# Patient Record
Sex: FEMALE | Race: WHITE | Employment: OTHER | URBAN - METROPOLITAN AREA
[De-identification: names, ages, dates, MRNs, and addresses within clinical notes are randomized per-mention and may not be internally consistent; named-entity substitution may affect disease eponyms.]

---

## 2017-01-03 ENCOUNTER — OFFICE VISIT (OUTPATIENT)
Dept: FAMILY MEDICINE CLINIC | Age: 82
End: 2017-01-03

## 2017-01-03 VITALS
DIASTOLIC BLOOD PRESSURE: 82 MMHG | HEIGHT: 66 IN | SYSTOLIC BLOOD PRESSURE: 146 MMHG | WEIGHT: 151 LBS | BODY MASS INDEX: 24.27 KG/M2

## 2017-01-03 DIAGNOSIS — I10 ESSENTIAL HYPERTENSION: Primary | ICD-10-CM

## 2017-01-03 DIAGNOSIS — Z23 FLU VACCINE NEED: ICD-10-CM

## 2017-01-03 DIAGNOSIS — E78.5 HYPERLIPIDEMIA, UNSPECIFIED HYPERLIPIDEMIA TYPE: ICD-10-CM

## 2017-01-03 DIAGNOSIS — I25.9 CHRONIC ISCHEMIC HEART DISEASE: Chronic | ICD-10-CM

## 2017-01-03 LAB
A/G RATIO: 2 (ref 1.1–2.2)
ALBUMIN SERPL-MCNC: 4.5 G/DL (ref 3.4–5)
ALP BLD-CCNC: 68 U/L (ref 40–129)
ALT SERPL-CCNC: 20 U/L (ref 10–40)
ANION GAP SERPL CALCULATED.3IONS-SCNC: 14 MMOL/L (ref 3–16)
AST SERPL-CCNC: 26 U/L (ref 15–37)
BASOPHILS ABSOLUTE: 0.1 K/UL (ref 0–0.2)
BASOPHILS RELATIVE PERCENT: 1 %
BILIRUB SERPL-MCNC: 0.6 MG/DL (ref 0–1)
BUN BLDV-MCNC: 16 MG/DL (ref 7–20)
CALCIUM SERPL-MCNC: 10.7 MG/DL (ref 8.3–10.6)
CHLORIDE BLD-SCNC: 105 MMOL/L (ref 99–110)
CHOLESTEROL, TOTAL: 180 MG/DL (ref 0–199)
CO2: 23 MMOL/L (ref 21–32)
CREAT SERPL-MCNC: 0.8 MG/DL (ref 0.6–1.2)
EOSINOPHILS ABSOLUTE: 0.6 K/UL (ref 0–0.6)
EOSINOPHILS RELATIVE PERCENT: 7.6 %
GFR AFRICAN AMERICAN: >60
GFR NON-AFRICAN AMERICAN: >60
GLOBULIN: 2.3 G/DL
GLUCOSE BLD-MCNC: 95 MG/DL (ref 70–99)
HCT VFR BLD CALC: 48.1 % (ref 36–48)
HDLC SERPL-MCNC: 45 MG/DL (ref 40–60)
HEMOGLOBIN: 15.8 G/DL (ref 12–16)
LDL CHOLESTEROL CALCULATED: 98 MG/DL
LYMPHOCYTES ABSOLUTE: 1.1 K/UL (ref 1–5.1)
LYMPHOCYTES RELATIVE PERCENT: 14.9 %
MCH RBC QN AUTO: 28.9 PG (ref 26–34)
MCHC RBC AUTO-ENTMCNC: 32.8 G/DL (ref 31–36)
MCV RBC AUTO: 88 FL (ref 80–100)
MONOCYTES ABSOLUTE: 0.5 K/UL (ref 0–1.3)
MONOCYTES RELATIVE PERCENT: 6.1 %
NEUTROPHILS ABSOLUTE: 5.4 K/UL (ref 1.7–7.7)
NEUTROPHILS RELATIVE PERCENT: 70.4 %
PDW BLD-RTO: 14.1 % (ref 12.4–15.4)
PLATELET # BLD: 158 K/UL (ref 135–450)
PMV BLD AUTO: 11.2 FL (ref 5–10.5)
POTASSIUM SERPL-SCNC: 4.3 MMOL/L (ref 3.5–5.1)
RBC # BLD: 5.47 M/UL (ref 4–5.2)
SODIUM BLD-SCNC: 142 MMOL/L (ref 136–145)
TOTAL PROTEIN: 6.8 G/DL (ref 6.4–8.2)
TRIGL SERPL-MCNC: 183 MG/DL (ref 0–150)
VLDLC SERPL CALC-MCNC: 37 MG/DL
WBC # BLD: 7.7 K/UL (ref 4–11)

## 2017-01-03 PROCEDURE — 36415 COLL VENOUS BLD VENIPUNCTURE: CPT | Performed by: FAMILY MEDICINE

## 2017-01-03 PROCEDURE — G0008 ADMIN INFLUENZA VIRUS VAC: HCPCS | Performed by: FAMILY MEDICINE

## 2017-01-03 PROCEDURE — 90662 IIV NO PRSV INCREASED AG IM: CPT | Performed by: FAMILY MEDICINE

## 2017-01-03 PROCEDURE — 99214 OFFICE O/P EST MOD 30 MIN: CPT | Performed by: FAMILY MEDICINE

## 2017-02-14 ENCOUNTER — HOSPITAL ENCOUNTER (OUTPATIENT)
Dept: OTHER | Age: 82
Discharge: HOME | End: 2017-02-14
Attending: FAMILY MEDICINE | Admitting: FAMILY MEDICINE

## 2017-02-14 ENCOUNTER — OFFICE VISIT (OUTPATIENT)
Dept: FAMILY MEDICINE CLINIC | Age: 82
End: 2017-02-14

## 2017-02-14 VITALS
WEIGHT: 156 LBS | BODY MASS INDEX: 25.07 KG/M2 | SYSTOLIC BLOOD PRESSURE: 142 MMHG | HEIGHT: 66 IN | DIASTOLIC BLOOD PRESSURE: 68 MMHG

## 2017-02-14 DIAGNOSIS — R93.89 ENDOMETRIAL STRIPE INCREASED: ICD-10-CM

## 2017-02-14 DIAGNOSIS — E78.5 HYPERLIPIDEMIA, UNSPECIFIED HYPERLIPIDEMIA TYPE: ICD-10-CM

## 2017-02-14 DIAGNOSIS — I25.9 CHRONIC ISCHEMIC HEART DISEASE: Chronic | ICD-10-CM

## 2017-02-14 DIAGNOSIS — Z01.818 PRE-OP EXAM: ICD-10-CM

## 2017-02-14 DIAGNOSIS — R93.89 ENDOMETRIAL STRIPE INCREASED: Primary | ICD-10-CM

## 2017-02-14 DIAGNOSIS — I10 ESSENTIAL HYPERTENSION: ICD-10-CM

## 2017-02-14 LAB
A/G RATIO: 1.3 (ref 1.1–2.2)
ABO/RH: NORMAL
ALBUMIN SERPL-MCNC: 4 G/DL (ref 3.4–5)
ALP BLD-CCNC: 67 U/L (ref 40–129)
ALT SERPL-CCNC: 22 U/L (ref 10–40)
ANION GAP SERPL CALCULATED.3IONS-SCNC: 13 MMOL/L (ref 3–16)
ANTIBODY SCREEN: NORMAL
ANTIBODY SCREEN: NORMAL
AST SERPL-CCNC: 28 U/L (ref 15–37)
BASOPHILS ABSOLUTE: 0.1 K/UL (ref 0–0.2)
BASOPHILS RELATIVE PERCENT: 0.9 %
BILIRUB SERPL-MCNC: 0.5 MG/DL (ref 0–1)
BUN BLDV-MCNC: 20 MG/DL (ref 7–20)
CALCIUM SERPL-MCNC: 10.6 MG/DL (ref 8.3–10.6)
CHLORIDE BLD-SCNC: 104 MMOL/L (ref 99–110)
CO2: 24 MMOL/L (ref 21–32)
CREAT SERPL-MCNC: 0.7 MG/DL (ref 0.6–1.2)
EOSINOPHILS ABSOLUTE: 0.4 K/UL (ref 0–0.6)
EOSINOPHILS RELATIVE PERCENT: 6.4 %
GFR AFRICAN AMERICAN: >60
GFR NON-AFRICAN AMERICAN: >60
GLOBULIN: 3.1 G/DL
GLUCOSE BLD-MCNC: 77 MG/DL (ref 70–99)
HCT VFR BLD CALC: 46.2 % (ref 36–48)
HEMOGLOBIN: 15 G/DL (ref 12–16)
LYMPHOCYTES ABSOLUTE: 1.3 K/UL (ref 1–5.1)
LYMPHOCYTES RELATIVE PERCENT: 18.5 %
MCH RBC QN AUTO: 28.8 PG (ref 26–34)
MCHC RBC AUTO-ENTMCNC: 32.5 G/DL (ref 31–36)
MCV RBC AUTO: 88.7 FL (ref 80–100)
MONOCYTES ABSOLUTE: 0.6 K/UL (ref 0–1.3)
MONOCYTES RELATIVE PERCENT: 8.8 %
NEUTROPHILS ABSOLUTE: 4.6 K/UL (ref 1.7–7.7)
NEUTROPHILS RELATIVE PERCENT: 65.4 %
PDW BLD-RTO: 14.7 % (ref 12.4–15.4)
PLATELET # BLD: 156 K/UL (ref 135–450)
PMV BLD AUTO: 11 FL (ref 5–10.5)
POTASSIUM SERPL-SCNC: 4.1 MMOL/L (ref 3.5–5.1)
RBC # BLD: 5.21 M/UL (ref 4–5.2)
SODIUM BLD-SCNC: 141 MMOL/L (ref 136–145)
TOTAL PROTEIN: 7.1 G/DL (ref 6.4–8.2)
WBC # BLD: 7 K/UL (ref 4–11)

## 2017-02-14 PROCEDURE — G8420 CALC BMI NORM PARAMETERS: HCPCS | Performed by: FAMILY MEDICINE

## 2017-02-14 PROCEDURE — G8484 FLU IMMUNIZE NO ADMIN: HCPCS | Performed by: FAMILY MEDICINE

## 2017-02-14 PROCEDURE — 93000 ELECTROCARDIOGRAM COMPLETE: CPT | Performed by: FAMILY MEDICINE

## 2017-02-14 PROCEDURE — 99214 OFFICE O/P EST MOD 30 MIN: CPT | Performed by: FAMILY MEDICINE

## 2017-02-14 PROCEDURE — 1036F TOBACCO NON-USER: CPT | Performed by: FAMILY MEDICINE

## 2017-02-14 PROCEDURE — G8427 DOCREV CUR MEDS BY ELIG CLIN: HCPCS | Performed by: FAMILY MEDICINE

## 2017-02-14 PROCEDURE — 1123F ACP DISCUSS/DSCN MKR DOCD: CPT | Performed by: FAMILY MEDICINE

## 2017-02-14 PROCEDURE — 4040F PNEUMOC VAC/ADMIN/RCVD: CPT | Performed by: FAMILY MEDICINE

## 2017-02-14 PROCEDURE — G8598 ASA/ANTIPLAT THER USED: HCPCS | Performed by: FAMILY MEDICINE

## 2017-02-14 PROCEDURE — G8400 PT W/DXA NO RESULTS DOC: HCPCS | Performed by: FAMILY MEDICINE

## 2017-02-14 PROCEDURE — 1090F PRES/ABSN URINE INCON ASSESS: CPT | Performed by: FAMILY MEDICINE

## 2017-02-28 ENCOUNTER — HOSPITAL ENCOUNTER (OUTPATIENT)
Dept: SURGERY | Age: 82
Discharge: HOME | End: 2017-02-28
Attending: OBSTETRICS & GYNECOLOGY | Admitting: OBSTETRICS & GYNECOLOGY

## 2017-02-28 VITALS
SYSTOLIC BLOOD PRESSURE: 152 MMHG | TEMPERATURE: 97.3 F | DIASTOLIC BLOOD PRESSURE: 62 MMHG | OXYGEN SATURATION: 95 % | HEART RATE: 60 BPM | RESPIRATION RATE: 18 BRPM

## 2017-02-28 DIAGNOSIS — R93.89 THICKENED ENDOMETRIUM: ICD-10-CM

## 2017-02-28 RX ORDER — OXYCODONE HYDROCHLORIDE AND ACETAMINOPHEN 5; 325 MG/1; MG/1
1 TABLET ORAL PRN
Status: ACTIVE | OUTPATIENT
Start: 2017-02-28 | End: 2017-02-28

## 2017-02-28 RX ORDER — MORPHINE SULFATE 2 MG/ML
1 INJECTION, SOLUTION INTRAMUSCULAR; INTRAVENOUS EVERY 5 MIN PRN
Status: DISCONTINUED | OUTPATIENT
Start: 2017-02-28 | End: 2017-03-01 | Stop reason: HOSPADM

## 2017-02-28 RX ORDER — OXYCODONE HYDROCHLORIDE AND ACETAMINOPHEN 5; 325 MG/1; MG/1
2 TABLET ORAL PRN
Status: ACTIVE | OUTPATIENT
Start: 2017-02-28 | End: 2017-02-28

## 2017-02-28 RX ORDER — MORPHINE SULFATE 10 MG/ML
2 INJECTION, SOLUTION INTRAMUSCULAR; INTRAVENOUS EVERY 5 MIN PRN
Status: DISCONTINUED | OUTPATIENT
Start: 2017-02-28 | End: 2017-03-01 | Stop reason: HOSPADM

## 2017-02-28 RX ORDER — MEPERIDINE HYDROCHLORIDE 50 MG/ML
12.5 INJECTION INTRAMUSCULAR; INTRAVENOUS; SUBCUTANEOUS EVERY 5 MIN PRN
Status: DISCONTINUED | OUTPATIENT
Start: 2017-02-28 | End: 2017-03-01 | Stop reason: HOSPADM

## 2017-02-28 RX ORDER — HYDRALAZINE HYDROCHLORIDE 20 MG/ML
5 INJECTION INTRAMUSCULAR; INTRAVENOUS EVERY 10 MIN PRN
Status: DISCONTINUED | OUTPATIENT
Start: 2017-02-28 | End: 2017-03-01 | Stop reason: HOSPADM

## 2017-02-28 RX ORDER — PROMETHAZINE HYDROCHLORIDE 25 MG/ML
6.25 INJECTION, SOLUTION INTRAMUSCULAR; INTRAVENOUS
Status: ACTIVE | OUTPATIENT
Start: 2017-02-28 | End: 2017-02-28

## 2017-02-28 RX ORDER — DIPHENHYDRAMINE HYDROCHLORIDE 50 MG/ML
12.5 INJECTION INTRAMUSCULAR; INTRAVENOUS
Status: ACTIVE | OUTPATIENT
Start: 2017-02-28 | End: 2017-02-28

## 2017-02-28 RX ORDER — LABETALOL HYDROCHLORIDE 5 MG/ML
5 INJECTION, SOLUTION INTRAVENOUS EVERY 10 MIN PRN
Status: DISCONTINUED | OUTPATIENT
Start: 2017-02-28 | End: 2017-03-01 | Stop reason: HOSPADM

## 2017-02-28 RX ORDER — SODIUM CHLORIDE, SODIUM LACTATE, POTASSIUM CHLORIDE, CALCIUM CHLORIDE 600; 310; 30; 20 MG/100ML; MG/100ML; MG/100ML; MG/100ML
INJECTION, SOLUTION INTRAVENOUS CONTINUOUS
Status: DISCONTINUED | OUTPATIENT
Start: 2017-02-28 | End: 2017-03-01 | Stop reason: HOSPADM

## 2017-02-28 RX ADMIN — SODIUM CHLORIDE, SODIUM LACTATE, POTASSIUM CHLORIDE, CALCIUM CHLORIDE: 600; 310; 30; 20 INJECTION, SOLUTION INTRAVENOUS at 10:02

## 2017-02-28 ASSESSMENT — PAIN SCALES - GENERAL
PAINLEVEL_OUTOF10: 0

## 2017-10-17 ENCOUNTER — OFFICE VISIT (OUTPATIENT)
Dept: FAMILY MEDICINE CLINIC | Age: 82
End: 2017-10-17

## 2017-10-17 VITALS
DIASTOLIC BLOOD PRESSURE: 70 MMHG | HEIGHT: 66 IN | SYSTOLIC BLOOD PRESSURE: 152 MMHG | OXYGEN SATURATION: 95 % | WEIGHT: 159 LBS | BODY MASS INDEX: 25.55 KG/M2

## 2017-10-17 DIAGNOSIS — Z23 NEEDS FLU SHOT: ICD-10-CM

## 2017-10-17 DIAGNOSIS — E78.5 HYPERLIPIDEMIA, UNSPECIFIED HYPERLIPIDEMIA TYPE: ICD-10-CM

## 2017-10-17 DIAGNOSIS — I10 ESSENTIAL HYPERTENSION: Primary | ICD-10-CM

## 2017-10-17 PROCEDURE — 1123F ACP DISCUSS/DSCN MKR DOCD: CPT | Performed by: FAMILY MEDICINE

## 2017-10-17 PROCEDURE — 1036F TOBACCO NON-USER: CPT | Performed by: FAMILY MEDICINE

## 2017-10-17 PROCEDURE — 1090F PRES/ABSN URINE INCON ASSESS: CPT | Performed by: FAMILY MEDICINE

## 2017-10-17 PROCEDURE — 90662 IIV NO PRSV INCREASED AG IM: CPT | Performed by: FAMILY MEDICINE

## 2017-10-17 PROCEDURE — 99214 OFFICE O/P EST MOD 30 MIN: CPT | Performed by: FAMILY MEDICINE

## 2017-10-17 PROCEDURE — G8484 FLU IMMUNIZE NO ADMIN: HCPCS | Performed by: FAMILY MEDICINE

## 2017-10-17 PROCEDURE — G8427 DOCREV CUR MEDS BY ELIG CLIN: HCPCS | Performed by: FAMILY MEDICINE

## 2017-10-17 PROCEDURE — G8400 PT W/DXA NO RESULTS DOC: HCPCS | Performed by: FAMILY MEDICINE

## 2017-10-17 PROCEDURE — G8598 ASA/ANTIPLAT THER USED: HCPCS | Performed by: FAMILY MEDICINE

## 2017-10-17 PROCEDURE — G0008 ADMIN INFLUENZA VIRUS VAC: HCPCS | Performed by: FAMILY MEDICINE

## 2017-10-17 PROCEDURE — 4040F PNEUMOC VAC/ADMIN/RCVD: CPT | Performed by: FAMILY MEDICINE

## 2017-10-17 PROCEDURE — G8419 CALC BMI OUT NRM PARAM NOF/U: HCPCS | Performed by: FAMILY MEDICINE

## 2017-10-17 ASSESSMENT — PATIENT HEALTH QUESTIONNAIRE - PHQ9
1. LITTLE INTEREST OR PLEASURE IN DOING THINGS: 0
SUM OF ALL RESPONSES TO PHQ9 QUESTIONS 1 & 2: 0
2. FEELING DOWN, DEPRESSED OR HOPELESS: 0
SUM OF ALL RESPONSES TO PHQ QUESTIONS 1-9: 0

## 2017-10-17 NOTE — PROGRESS NOTES
Vaccine Information Sheet, \"Influenza - Inactivated\"  given to Macey Canada, or parent/legal guardian of  Macey Canada and verbalized understanding. Patient responses:    Have you ever had a reaction to a flu vaccine? No  Are you able to eat eggs without adverse effects? Yes  Do you have any current illness? 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 Please see immunization tab. SUBJECTIVE:  Macey Canada is a 80 y.o. female who presents for evaluation of  hypertension and hyperlipidemia. She indicates that she is feeling well and denies any symptoms referable to her elevated blood pressure. Specifically denies chest pain, palpitations, dyspnea, orthopnea, PND or peripheral edema or neuro sx. No anorexia,  or leg cramps noted. Current medication regimen is as listed below. She denies any side effects of medication, and has been taking it regularly. Lab Results   Component Value Date    [address] 98 01/03/2017       She sees cardiology for her medication management. At her last visit last month, he wanted her to increase her amlodipine to 5 mg. Patient is not wanting to do that. Her blood pressure has been better for her at home and so she has not made that change. She remains active, she is still independent, driving and managing her finances and cooking and cleaning etc. She is no acute issues here today. Reviewed lab work from last time. Current Outpatient Prescriptions   Medication Sig Dispense Refill    naproxen (NAPROSYN) 375 MG tablet Take 1 tablet by mouth 2 times daily (with meals) GI precautions (Patient taking differently: Take 375 mg by mouth as needed GI precautions) 60 tablet 3    aspirin 81 MG tablet Take 81 mg by mouth daily      amLODIPine (NORVASC) 2.5 MG tablet Take 2.5 mg by mouth daily.  metoprolol (LOPRESSOR) 50 MG tablet Take 50 mg by mouth 2 times daily.         rosuvastatin (CRESTOR) 5 MG tablet Take 1 tablet by mouth daily. 60 tablet 0     No current facility-administered medications for this visit. Allergies   Allergen Reactions    Carbocaine [Mepivacaine Hcl] Other (See Comments)     Dizzy, almost pass out    Prevnar 13 [Pneumococcal 13-Louisa Conj Vacc] Swelling     Localized reaction    Zofran [Ondansetron Hcl] Other (See Comments)     Pt states it caused her stomach to \"burn\"       Social History   Substance Use Topics    Smoking status: Never Smoker    Smokeless tobacco: Never Used    Alcohol use No       OBJECTIVE:   BP (!) 152/70   Ht 5' 6\" (1.676 m)   Wt 159 lb (72.1 kg)   SpO2 95%   BMI 25.66 kg/m²   NAD  Neck no bruit or JVD  S1 and S2 normal, no murmurs, clicks, gallops or rubs. Regular rate and rhythm. Chest is clear; no wheezes or rales. No edema or JVD. Neuro alert, no CN or motor deficits  Psych nl mood, thought and judgement    ASSESSMENT:  1. Essential hypertension  Stable, continue current regimen, continue monitoring at home    2. Hyperlipidemia, unspecified hyperlipidemia type  Stable    3. stable  stable    4. Needs flu shot  INFLUENZA, HIGH DOSE, 65 YRS +, IM, PF, PREFILL SYR, 0.5ML (FLUZONE HD)        Plan:  1)  Medication: continue current medication regimen unchanged  2)  Recheck in 6 months, sooner should new symptoms or problems arise. 3) Gaetano Travis received counseling on the following healthy behaviors: medication adherence        I have instructed Barry Radhika to complete a self tracking handout on Blood Pressures  and instructed them to bring it with them to her next appointment. Discussed use, benefit, and side effects of prescribed medications. Barriers to medication compliance addressed. All patient questions answered. Pt voiced understanding.

## 2017-12-14 RX ORDER — NAPROXEN 375 MG/1
375 TABLET ORAL 2 TIMES DAILY WITH MEALS
Qty: 60 TABLET | Refills: 0 | Status: SHIPPED | OUTPATIENT
Start: 2017-12-14 | End: 2018-03-28

## 2018-03-28 ENCOUNTER — OFFICE VISIT (OUTPATIENT)
Dept: FAMILY MEDICINE CLINIC | Age: 83
End: 2018-03-28

## 2018-03-28 VITALS
DIASTOLIC BLOOD PRESSURE: 70 MMHG | WEIGHT: 158.8 LBS | SYSTOLIC BLOOD PRESSURE: 140 MMHG | HEIGHT: 66 IN | OXYGEN SATURATION: 96 % | BODY MASS INDEX: 25.52 KG/M2 | HEART RATE: 75 BPM

## 2018-03-28 DIAGNOSIS — I10 ESSENTIAL HYPERTENSION: ICD-10-CM

## 2018-03-28 DIAGNOSIS — I25.9 CHRONIC ISCHEMIC HEART DISEASE: Primary | Chronic | ICD-10-CM

## 2018-03-28 DIAGNOSIS — E78.5 HYPERLIPIDEMIA, UNSPECIFIED HYPERLIPIDEMIA TYPE: ICD-10-CM

## 2018-03-28 PROCEDURE — 1036F TOBACCO NON-USER: CPT | Performed by: FAMILY MEDICINE

## 2018-03-28 PROCEDURE — G8598 ASA/ANTIPLAT THER USED: HCPCS | Performed by: FAMILY MEDICINE

## 2018-03-28 PROCEDURE — G8400 PT W/DXA NO RESULTS DOC: HCPCS | Performed by: FAMILY MEDICINE

## 2018-03-28 PROCEDURE — 1090F PRES/ABSN URINE INCON ASSESS: CPT | Performed by: FAMILY MEDICINE

## 2018-03-28 PROCEDURE — G8427 DOCREV CUR MEDS BY ELIG CLIN: HCPCS | Performed by: FAMILY MEDICINE

## 2018-03-28 PROCEDURE — G8419 CALC BMI OUT NRM PARAM NOF/U: HCPCS | Performed by: FAMILY MEDICINE

## 2018-03-28 PROCEDURE — 99214 OFFICE O/P EST MOD 30 MIN: CPT | Performed by: FAMILY MEDICINE

## 2018-03-28 PROCEDURE — 1123F ACP DISCUSS/DSCN MKR DOCD: CPT | Performed by: FAMILY MEDICINE

## 2018-03-28 PROCEDURE — G8482 FLU IMMUNIZE ORDER/ADMIN: HCPCS | Performed by: FAMILY MEDICINE

## 2018-03-28 PROCEDURE — 4040F PNEUMOC VAC/ADMIN/RCVD: CPT | Performed by: FAMILY MEDICINE

## 2018-03-28 RX ORDER — VALSARTAN 80 MG/1
TABLET ORAL
Refills: 0 | COMMUNITY
Start: 2018-03-16 | End: 2018-09-24 | Stop reason: SINTOL

## 2018-03-28 RX ORDER — ISOSORBIDE MONONITRATE 30 MG/1
15 TABLET, EXTENDED RELEASE ORAL
COMMUNITY
Start: 2018-03-20

## 2018-03-28 ASSESSMENT — ENCOUNTER SYMPTOMS: SHORTNESS OF BREATH: 0

## 2018-03-28 NOTE — PROGRESS NOTES
shortness of breath. Cardiovascular: Negative for chest pain and leg swelling. Neurological: Negative for dizziness and headaches. Physical Exam   Constitutional: She is oriented to person, place, and time. No distress. Neck: Neck supple. No JVD present. Cardiovascular: Normal rate, regular rhythm and normal heart sounds. Pulmonary/Chest: Effort normal and breath sounds normal. No respiratory distress. She has no wheezes. She has no rales. Neurological: She is alert and oriented to person, place, and time. Vitals:    03/28/18 1515 03/28/18 1545   BP: (!) 140/72 (!) 140/70   Site: Right Arm    Position: Sitting    Cuff Size: Medium Adult    Pulse: 75    SpO2: 96%    Weight: 158 lb 12.8 oz (72 kg)    Height: 5' 6\" (1.676 m)        Assessment/Plan:   Ember Valero was seen today for follow-up from hospital.    Diagnoses and all orders for this visit:    Chronic ischemic heart disease    Essential hypertension    Hyperlipidemia, unspecified hyperlipidemia type      Patient is doing well, no need for medication adjustment.  She was encouraged to schedule with the cardiologist.

## 2018-09-24 ENCOUNTER — OFFICE VISIT (OUTPATIENT)
Dept: FAMILY MEDICINE CLINIC | Age: 83
End: 2018-09-24
Payer: MEDICARE

## 2018-09-24 VITALS
SYSTOLIC BLOOD PRESSURE: 137 MMHG | BODY MASS INDEX: 25.23 KG/M2 | DIASTOLIC BLOOD PRESSURE: 78 MMHG | HEART RATE: 62 BPM | TEMPERATURE: 97.5 F | HEIGHT: 66 IN | WEIGHT: 157 LBS | RESPIRATION RATE: 12 BRPM | OXYGEN SATURATION: 97 %

## 2018-09-24 DIAGNOSIS — Z79.899 MEDICATION MANAGEMENT: ICD-10-CM

## 2018-09-24 DIAGNOSIS — G89.29 CHRONIC PAIN OF BOTH ANKLES: ICD-10-CM

## 2018-09-24 DIAGNOSIS — I25.9 CHRONIC ISCHEMIC HEART DISEASE: Primary | ICD-10-CM

## 2018-09-24 DIAGNOSIS — M25.572 CHRONIC PAIN OF BOTH ANKLES: ICD-10-CM

## 2018-09-24 DIAGNOSIS — M25.571 CHRONIC PAIN OF BOTH ANKLES: ICD-10-CM

## 2018-09-24 PROCEDURE — 4040F PNEUMOC VAC/ADMIN/RCVD: CPT | Performed by: PHYSICIAN ASSISTANT

## 2018-09-24 PROCEDURE — 1036F TOBACCO NON-USER: CPT | Performed by: PHYSICIAN ASSISTANT

## 2018-09-24 PROCEDURE — G8427 DOCREV CUR MEDS BY ELIG CLIN: HCPCS | Performed by: PHYSICIAN ASSISTANT

## 2018-09-24 PROCEDURE — 1090F PRES/ABSN URINE INCON ASSESS: CPT | Performed by: PHYSICIAN ASSISTANT

## 2018-09-24 PROCEDURE — 99213 OFFICE O/P EST LOW 20 MIN: CPT | Performed by: PHYSICIAN ASSISTANT

## 2018-09-24 PROCEDURE — G8598 ASA/ANTIPLAT THER USED: HCPCS | Performed by: PHYSICIAN ASSISTANT

## 2018-09-24 PROCEDURE — G8419 CALC BMI OUT NRM PARAM NOF/U: HCPCS | Performed by: PHYSICIAN ASSISTANT

## 2018-09-24 PROCEDURE — 90662 IIV NO PRSV INCREASED AG IM: CPT | Performed by: PHYSICIAN ASSISTANT

## 2018-09-24 PROCEDURE — 1101F PT FALLS ASSESS-DOCD LE1/YR: CPT | Performed by: PHYSICIAN ASSISTANT

## 2018-09-24 PROCEDURE — G0008 ADMIN INFLUENZA VIRUS VAC: HCPCS | Performed by: PHYSICIAN ASSISTANT

## 2018-09-24 PROCEDURE — G8400 PT W/DXA NO RESULTS DOC: HCPCS | Performed by: PHYSICIAN ASSISTANT

## 2018-09-24 PROCEDURE — 1123F ACP DISCUSS/DSCN MKR DOCD: CPT | Performed by: PHYSICIAN ASSISTANT

## 2018-09-24 ASSESSMENT — PATIENT HEALTH QUESTIONNAIRE - PHQ9
SUM OF ALL RESPONSES TO PHQ QUESTIONS 1-9: 0
1. LITTLE INTEREST OR PLEASURE IN DOING THINGS: 0
SUM OF ALL RESPONSES TO PHQ9 QUESTIONS 1 & 2: 0
SUM OF ALL RESPONSES TO PHQ QUESTIONS 1-9: 0
2. FEELING DOWN, DEPRESSED OR HOPELESS: 0

## 2018-09-24 NOTE — PROGRESS NOTES
[facility] [address] COMPLAINT  Chief Complaint   Patient presents with    Ankle Pain     pt states that since the beginning of the year she has had pain, burning, and itching in both ankles    Neck Pain     pt woke up with pain on the left eliza eof neck       HISTORY OF PRESENT  ILLNESS  Dhiraj Melendrez is a 80 y.o.  female  Cc feeling bad with 6 months with ankle pain, burning, and pruititis and having  hot flashes. Dermatologist was seen and rx'd skin creams but did not help. Was seen in hospital for chest pain March 2018, where metoprolol was adjusted to 75mg bid and Imdur 30mg added. She self decreased her metoprolol to 75mg qam and 50mg qhs and cut her Imdur in half to make it 15mg per day. She is scheduled to see cardiologist at [facility] next month. Also awoke this morning with left sided neck muscle pain. Weather changes recently. ROS    Remaining are reviewed and otherwise negative for other constitutional, neurologic, EENT, cardiac, pulmonary, GI, , musculoskeletal or extremity complaints. PAST MEDICAL/SURGICAL, SOCIAL, &  FAMILY HISTORY:  Reviewed and updated accordingly. ALLERGIES :   Carbocaine [mepivacaine hcl]; Prevnar 13 [pneumococcal 13-christina conj vacc]; and Zofran [ondansetron hcl]    MEDICATIONS:  Current Outpatient Prescriptions   Medication Sig Dispense Refill    isosorbide mononitrate (IMDUR) 30 MG extended release tablet Take 30 mg by mouth      aspirin 81 MG tablet Take 81 mg by mouth daily      amLODIPine (NORVASC) 2.5 MG tablet Take 2.5 mg by mouth daily.  metoprolol (LOPRESSOR) 50 MG tablet Take 50 mg by mouth 2 times daily.  rosuvastatin (CRESTOR) 5 MG tablet Take 1 tablet by mouth daily. 60 tablet 0     No current facility-administered medications for this visit.           PHYSICAL EXAM     Vitals:    09/24/18 1121   BP: 137/78   Pulse: 62   Resp: 12   Temp: 97.5 °F (36.4 °C)   TempSrc: Oral   SpO2: 97%   Weight: 157 lb (71.2

## 2018-09-24 NOTE — PROGRESS NOTES
Vaccine Information Sheet, \"Influenza - Inactivated\"  given to Dale Estrada, or parent/legal guardian of  Dale Estrada and verbalized understanding. Patient responses:    Have you ever had a reaction to a flu vaccine? No  Are you able to eat eggs without adverse effects? Yes  Do you have any current illness? No  Have you ever had Guillian Delano Syndrome? No    Flu vaccine given per order. Please see immunization tab.

## 2018-10-08 ENCOUNTER — HOSPITAL ENCOUNTER (EMERGENCY)
Age: 83
Discharge: HOME | End: 2018-10-08
Payer: MEDICARE

## 2018-10-08 ENCOUNTER — TELEPHONE (OUTPATIENT)
Dept: FAMILY MEDICINE CLINIC | Age: 83
End: 2018-10-08

## 2018-10-08 ENCOUNTER — APPOINTMENT (OUTPATIENT)
Dept: CT IMAGING | Age: 83
End: 2018-10-08
Payer: MEDICARE

## 2018-10-08 VITALS
RESPIRATION RATE: 16 BRPM | WEIGHT: 157 LBS | TEMPERATURE: 98.1 F | DIASTOLIC BLOOD PRESSURE: 61 MMHG | HEART RATE: 66 BPM | OXYGEN SATURATION: 92 % | SYSTOLIC BLOOD PRESSURE: 131 MMHG | HEIGHT: 66 IN | BODY MASS INDEX: 25.23 KG/M2

## 2018-10-08 DIAGNOSIS — R10.84 GENERALIZED ABDOMINAL PAIN: Primary | ICD-10-CM

## 2018-10-08 DIAGNOSIS — R11.0 NAUSEA WITHOUT VOMITING: ICD-10-CM

## 2018-10-08 LAB
A/G RATIO: 1.5 (ref 1.1–2.2)
ALBUMIN SERPL-MCNC: 4 G/DL (ref 3.4–5)
ALP BLD-CCNC: 57 U/L (ref 40–129)
ALT SERPL-CCNC: 18 U/L (ref 10–40)
ANION GAP SERPL CALCULATED.3IONS-SCNC: 9 MMOL/L (ref 3–16)
AST SERPL-CCNC: 23 U/L (ref 15–37)
BASOPHILS ABSOLUTE: 0.1 K/UL (ref 0–0.2)
BASOPHILS RELATIVE PERCENT: 0.8 %
BILIRUB SERPL-MCNC: 0.4 MG/DL (ref 0–1)
BILIRUBIN URINE: NEGATIVE
BLOOD, URINE: NEGATIVE
BUN BLDV-MCNC: 19 MG/DL (ref 7–20)
CALCIUM SERPL-MCNC: 10.4 MG/DL (ref 8.3–10.6)
CHLORIDE BLD-SCNC: 107 MMOL/L (ref 99–110)
CLARITY: CLEAR
CO2: 26 MMOL/L (ref 21–32)
COLOR: YELLOW
CREAT SERPL-MCNC: 0.7 MG/DL (ref 0.6–1.2)
EOSINOPHILS ABSOLUTE: 0.2 K/UL (ref 0–0.6)
EOSINOPHILS RELATIVE PERCENT: 2.4 %
EPITHELIAL CELLS, UA: NORMAL /HPF
GFR AFRICAN AMERICAN: >60
GFR NON-AFRICAN AMERICAN: >60
GLOBULIN: 2.6 G/DL
GLUCOSE BLD-MCNC: 98 MG/DL (ref 70–99)
GLUCOSE URINE: NEGATIVE MG/DL
HCT VFR BLD CALC: 44.5 % (ref 36–48)
HEMOGLOBIN: 14.9 G/DL (ref 12–16)
KETONES, URINE: NEGATIVE MG/DL
LEUKOCYTE ESTERASE, URINE: ABNORMAL
LIPASE: 48 U/L (ref 13–60)
LYMPHOCYTES ABSOLUTE: 0.9 K/UL (ref 1–5.1)
LYMPHOCYTES RELATIVE PERCENT: 9 %
MCH RBC QN AUTO: 29.6 PG (ref 26–34)
MCHC RBC AUTO-ENTMCNC: 33.4 G/DL (ref 31–36)
MCV RBC AUTO: 88.6 FL (ref 80–100)
MICROSCOPIC EXAMINATION: YES
MONOCYTES ABSOLUTE: 0.5 K/UL (ref 0–1.3)
MONOCYTES RELATIVE PERCENT: 5.3 %
NEUTROPHILS ABSOLUTE: 8.5 K/UL (ref 1.7–7.7)
NEUTROPHILS RELATIVE PERCENT: 82.5 %
NITRITE, URINE: NEGATIVE
PDW BLD-RTO: 13.7 % (ref 12.4–15.4)
PH UA: 6.5
PLATELET # BLD: 160 K/UL (ref 135–450)
PMV BLD AUTO: 10.2 FL (ref 5–10.5)
POTASSIUM SERPL-SCNC: 4.3 MMOL/L (ref 3.5–5.1)
PROTEIN UA: NEGATIVE MG/DL
RBC # BLD: 5.03 M/UL (ref 4–5.2)
RBC UA: NORMAL /HPF (ref 0–2)
SODIUM BLD-SCNC: 142 MMOL/L (ref 136–145)
SPECIFIC GRAVITY UA: <=1.005
TOTAL PROTEIN: 6.6 G/DL (ref 6.4–8.2)
URINE TYPE: ABNORMAL
UROBILINOGEN, URINE: 0.2 E.U./DL
WBC # BLD: 10.3 K/UL (ref 4–11)
WBC UA: NORMAL /HPF (ref 0–5)

## 2018-10-08 PROCEDURE — 83690 ASSAY OF LIPASE: CPT

## 2018-10-08 PROCEDURE — 96374 THER/PROPH/DIAG INJ IV PUSH: CPT

## 2018-10-08 PROCEDURE — 6360000004 HC RX CONTRAST MEDICATION: Performed by: NURSE PRACTITIONER

## 2018-10-08 PROCEDURE — 2580000003 HC RX 258: Performed by: NURSE PRACTITIONER

## 2018-10-08 PROCEDURE — 6370000000 HC RX 637 (ALT 250 FOR IP): Performed by: NURSE PRACTITIONER

## 2018-10-08 PROCEDURE — 96361 HYDRATE IV INFUSION ADD-ON: CPT

## 2018-10-08 PROCEDURE — 6360000002 HC RX W HCPCS: Performed by: NURSE PRACTITIONER

## 2018-10-08 PROCEDURE — 81001 URINALYSIS AUTO W/SCOPE: CPT

## 2018-10-08 PROCEDURE — 99284 EMERGENCY DEPT VISIT MOD MDM: CPT

## 2018-10-08 PROCEDURE — 74177 CT ABD & PELVIS W/CONTRAST: CPT

## 2018-10-08 PROCEDURE — 80053 COMPREHEN METABOLIC PANEL: CPT

## 2018-10-08 PROCEDURE — 85025 COMPLETE CBC W/AUTO DIFF WBC: CPT

## 2018-10-08 RX ORDER — DICYCLOMINE HYDROCHLORIDE 10 MG/1
10 CAPSULE ORAL EVERY 6 HOURS PRN
Qty: 20 CAPSULE | Refills: 0 | Status: SHIPPED | OUTPATIENT
Start: 2018-10-08 | End: 2019-10-21

## 2018-10-08 RX ORDER — OMEPRAZOLE 40 MG/1
40 CAPSULE, DELAYED RELEASE ORAL DAILY
Qty: 30 CAPSULE | Refills: 0 | Status: SHIPPED | OUTPATIENT
Start: 2018-10-08 | End: 2019-10-21 | Stop reason: ALTCHOICE

## 2018-10-08 RX ORDER — MORPHINE SULFATE 4 MG/ML
4 INJECTION, SOLUTION INTRAMUSCULAR; INTRAVENOUS ONCE
Status: DISCONTINUED | OUTPATIENT
Start: 2018-10-08 | End: 2018-10-08 | Stop reason: HOSPADM

## 2018-10-08 RX ORDER — 0.9 % SODIUM CHLORIDE 0.9 %
1000 INTRAVENOUS SOLUTION INTRAVENOUS ONCE
Status: COMPLETED | OUTPATIENT
Start: 2018-10-08 | End: 2018-10-08

## 2018-10-08 RX ORDER — METOCLOPRAMIDE HYDROCHLORIDE 5 MG/ML
10 INJECTION INTRAMUSCULAR; INTRAVENOUS ONCE
Status: COMPLETED | OUTPATIENT
Start: 2018-10-08 | End: 2018-10-08

## 2018-10-08 RX ORDER — METOCLOPRAMIDE 10 MG/1
10 TABLET ORAL 4 TIMES DAILY
Qty: 20 TABLET | Refills: 0 | Status: SHIPPED | OUTPATIENT
Start: 2018-10-08 | End: 2019-10-21 | Stop reason: ALTCHOICE

## 2018-10-08 RX ADMIN — IOPAMIDOL 100 ML: 755 INJECTION, SOLUTION INTRAVENOUS at 11:11

## 2018-10-08 RX ADMIN — METOCLOPRAMIDE 10 MG: 5 INJECTION, SOLUTION INTRAMUSCULAR; INTRAVENOUS at 10:49

## 2018-10-08 RX ADMIN — SODIUM CHLORIDE 1000 ML: 9 INJECTION, SOLUTION INTRAVENOUS at 10:49

## 2018-10-08 RX ADMIN — LIDOCAINE HYDROCHLORIDE: 20 SOLUTION ORAL; TOPICAL at 13:12

## 2018-10-08 ASSESSMENT — PAIN SCALES - GENERAL
PAINLEVEL_OUTOF10: 3
PAINLEVEL_OUTOF10: 1
PAINLEVEL_OUTOF10: 3

## 2018-10-08 ASSESSMENT — PAIN DESCRIPTION - LOCATION
LOCATION: ABDOMEN

## 2018-10-08 ASSESSMENT — PAIN DESCRIPTION - FREQUENCY: FREQUENCY: INTERMITTENT

## 2018-10-08 ASSESSMENT — ENCOUNTER SYMPTOMS
COLOR CHANGE: 0
ABDOMINAL PAIN: 1
NAUSEA: 1
WHEEZING: 0
VOMITING: 0
DIARRHEA: 0
SHORTNESS OF BREATH: 0
COUGH: 0
BACK PAIN: 0

## 2018-10-08 ASSESSMENT — PAIN DESCRIPTION - PAIN TYPE
TYPE: ACUTE PAIN

## 2018-10-08 ASSESSMENT — PAIN DESCRIPTION - DESCRIPTORS
DESCRIPTORS: BURNING
DESCRIPTORS: BURNING

## 2018-10-08 NOTE — ED PROVIDER NOTES
moist.   Eyes: Right eye exhibits no discharge. Left eye exhibits no discharge. Neck: Normal range of motion. Neck supple. Cardiovascular: Normal rate and intact distal pulses. Pulmonary/Chest: Effort normal and breath sounds normal. No respiratory distress. Abdominal: Soft. There is tenderness. There is no guarding. Abdomen is soft and nondistended. Bowel sounds are positive, generalized tenderness of the abdomen however no acute ascites or rigidity. Musculoskeletal: Normal range of motion. Neurological: She is alert and oriented to person, place, and time. GCS eye subscore is 4. GCS verbal subscore is 5. GCS motor subscore is 6. Skin: Skin is warm. She is not diaphoretic. No pallor. Psychiatric: She has a normal mood and affect. Her behavior is normal.   Nursing note and vitals reviewed.       MEDICAL DECISION MAKING    Vitals:    Vitals:    10/08/18 0953 10/08/18 1054 10/08/18 1228 10/08/18 1342   BP: (!) 153/79 134/69 128/71 131/61   Pulse: 74 69 61 66   Resp: 14 16 16 16   Temp: 98.9 °F (37.2 °C)   98.1 °F (36.7 °C)   TempSrc: Oral      SpO2: 95% 92% 92% 92%   Weight: 157 lb (71.2 kg)      Height: 5' 6\" (1.676 m)          LABS:   Labs Reviewed   CBC WITH AUTO DIFFERENTIAL - Abnormal; Notable for the following:        Result Value    Neutrophils # 8.5 (*)     Lymphocytes # 0.9 (*)     All other components within normal limits    Narrative:     Performed at:  [address],  [place], [address] [employer]   Phone [phone]   URINALYSIS - Abnormal; Notable for the following:     Leukocyte Esterase, Urine TRACE (*)     All other components within normal limits    Narrative:     Performed at:  [address],  [place], [address] [employer]   Phone [phone] METABOLIC PANEL    Narrative:     Performed at:  [address],  [place], [address]   Phone scan of the abdomen shows no acute abnormality in abdomen or pelvis. Upon reevaluation she states that she has burning sensation, I ordered her a GI cocktail. I did educate her and her daughter that she possibly would benefit from endoscopy including upper GI and colonoscopy. The patient states she has seen Dr. Dawn Servin in the past, I recommended that the patient follow up with GI in the next 2 days for reevaluation. However, I concerned for appendicitis, pancreatitis, diverticulitis or cholecystitis. No concerns for pyelonephritis, sepsis or infection. Therefore, shared medical decision was made between the patient, her daughter and myself we agreed she could be discharged home. Patient was discharged home with her surgeon for Bentyl, Reglan and Prilosec. Educated take medicine as prescribed. Educated to follow-up with her family doctor, gastroenterologist and cardiologist.  Educated to return for any worsening symptoms. Educated take medicine as prescribed. The patient tolerated their visit well. I evaluated the patient. The physician was available for consultation as needed. The patient and / or the family were informed of the results of any tests, a time was given to answer questions, a plan was proposed and they agreed with plan. Patient verbalized understanding of discharge instructions and was discharged from the department in stable condition. CLINICAL IMPRESSION:  1. Generalized abdominal pain    2.  Nausea without vomiting        DISPOSITION Decision To Discharge 10/08/2018 01:17:20 PM      PATIENT REFERRED TO:  Xena Esqueda MD  [address]  [phone]    Schedule an appointment as soon as possible for a visit in 2 days  Please follow-up with her family doctor in 2 days for reevaluation    Syeda Champion MD  [address] [facility], [id number] [place] [address]  [phone]    Schedule an appointment as soon as possible for a visit in 1 day  Please

## 2018-10-08 NOTE — TELEPHONE ENCOUNTER
Patient was seen on 9-24-18 by Dolores for hot flashes and abdomin pain. Patient called this morning with the following symptoms:  Multiple hot flashes, queezie, nausea, burning pain in her lower abdomin, not feeling right. I offered an appointment for this afternoon. She declined and would like to know if she should go to the ER?  11:30 same day with Dr. Clint Hawley?

## 2018-11-16 ENCOUNTER — TELEPHONE (OUTPATIENT)
Dept: FAMILY MEDICINE CLINIC | Age: 83
End: 2018-11-16

## 2019-05-14 ENCOUNTER — HOSPITAL ENCOUNTER (EMERGENCY)
Age: 84
Discharge: HOME | End: 2019-05-14
Attending: EMERGENCY MEDICINE
Payer: MEDICARE

## 2019-05-14 ENCOUNTER — APPOINTMENT (OUTPATIENT)
Dept: ULTRASOUND IMAGING | Age: 84
End: 2019-05-14
Payer: MEDICARE

## 2019-05-14 VITALS
HEART RATE: 72 BPM | RESPIRATION RATE: 16 BRPM | BODY MASS INDEX: 25.34 KG/M2 | OXYGEN SATURATION: 93 % | TEMPERATURE: 97.6 F | WEIGHT: 157 LBS | SYSTOLIC BLOOD PRESSURE: 149 MMHG | DIASTOLIC BLOOD PRESSURE: 62 MMHG

## 2019-05-14 DIAGNOSIS — K82.8 GALLBLADDER SLUDGE: ICD-10-CM

## 2019-05-14 DIAGNOSIS — R10.10 UPPER ABDOMINAL PAIN: Primary | ICD-10-CM

## 2019-05-14 LAB
A/G RATIO: 1.4 (ref 1.1–2.2)
ALBUMIN SERPL-MCNC: 4.2 G/DL (ref 3.4–5)
ALP BLD-CCNC: 66 U/L (ref 40–129)
ALT SERPL-CCNC: 21 U/L (ref 10–40)
ANION GAP SERPL CALCULATED.3IONS-SCNC: 13 MMOL/L (ref 3–16)
AST SERPL-CCNC: 32 U/L (ref 15–37)
BASOPHILS ABSOLUTE: 0 K/UL (ref 0–0.2)
BASOPHILS RELATIVE PERCENT: 0.5 %
BILIRUB SERPL-MCNC: 0.6 MG/DL (ref 0–1)
BILIRUBIN URINE: NEGATIVE
BLOOD, URINE: NEGATIVE
BUN BLDV-MCNC: 13 MG/DL (ref 7–20)
CALCIUM SERPL-MCNC: 10.9 MG/DL (ref 8.3–10.6)
CHLORIDE BLD-SCNC: 106 MMOL/L (ref 99–110)
CLARITY: CLEAR
CO2: 21 MMOL/L (ref 21–32)
COLOR: YELLOW
CREAT SERPL-MCNC: 0.7 MG/DL (ref 0.6–1.2)
EOSINOPHILS ABSOLUTE: 0.3 K/UL (ref 0–0.6)
EOSINOPHILS RELATIVE PERCENT: 3.8 %
GFR AFRICAN AMERICAN: >60
GFR NON-AFRICAN AMERICAN: >60
GLOBULIN: 2.9 G/DL
GLUCOSE BLD-MCNC: 94 MG/DL (ref 70–99)
GLUCOSE URINE: NEGATIVE MG/DL
HCT VFR BLD CALC: 46.3 % (ref 36–48)
HEMOGLOBIN: 15.5 G/DL (ref 12–16)
KETONES, URINE: NEGATIVE MG/DL
LEUKOCYTE ESTERASE, URINE: NEGATIVE
LIPASE: 65 U/L (ref 13–60)
LYMPHOCYTES ABSOLUTE: 1.3 K/UL (ref 1–5.1)
LYMPHOCYTES RELATIVE PERCENT: 18.4 %
MCH RBC QN AUTO: 29.6 PG (ref 26–34)
MCHC RBC AUTO-ENTMCNC: 33.5 G/DL (ref 31–36)
MCV RBC AUTO: 88.5 FL (ref 80–100)
MICROSCOPIC EXAMINATION: NORMAL
MONOCYTES ABSOLUTE: 0.6 K/UL (ref 0–1.3)
MONOCYTES RELATIVE PERCENT: 7.9 %
NEUTROPHILS ABSOLUTE: 4.9 K/UL (ref 1.7–7.7)
NEUTROPHILS RELATIVE PERCENT: 69.4 %
NITRITE, URINE: NEGATIVE
PDW BLD-RTO: 14.1 % (ref 12.4–15.4)
PH UA: 5.5 (ref 5–8)
PLATELET # BLD: 163 K/UL (ref 135–450)
PMV BLD AUTO: 11.2 FL (ref 5–10.5)
POTASSIUM SERPL-SCNC: 4 MMOL/L (ref 3.5–5.1)
PROTEIN UA: NEGATIVE MG/DL
RBC # BLD: 5.24 M/UL (ref 4–5.2)
SODIUM BLD-SCNC: 140 MMOL/L (ref 136–145)
SPECIFIC GRAVITY UA: <=1.005 (ref 1–1.03)
TOTAL PROTEIN: 7.1 G/DL (ref 6.4–8.2)
TROPONIN: <0.01 NG/ML
URINE TYPE: NORMAL
UROBILINOGEN, URINE: 0.2 E.U./DL
WBC # BLD: 7.1 K/UL (ref 4–11)

## 2019-05-14 PROCEDURE — 99284 EMERGENCY DEPT VISIT MOD MDM: CPT

## 2019-05-14 PROCEDURE — 84484 ASSAY OF TROPONIN QUANT: CPT

## 2019-05-14 PROCEDURE — 76705 ECHO EXAM OF ABDOMEN: CPT

## 2019-05-14 PROCEDURE — 83690 ASSAY OF LIPASE: CPT

## 2019-05-14 PROCEDURE — 93005 ELECTROCARDIOGRAM TRACING: CPT | Performed by: PHYSICIAN ASSISTANT

## 2019-05-14 PROCEDURE — 81003 URINALYSIS AUTO W/O SCOPE: CPT

## 2019-05-14 PROCEDURE — 80053 COMPREHEN METABOLIC PANEL: CPT

## 2019-05-14 PROCEDURE — 85025 COMPLETE CBC W/AUTO DIFF WBC: CPT

## 2019-05-14 RX ORDER — SUCRALFATE ORAL 1 G/10ML
1 SUSPENSION ORAL 4 TIMES DAILY
Qty: 1200 ML | Refills: 0 | Status: SHIPPED | OUTPATIENT
Start: 2019-05-14 | End: 2019-10-21 | Stop reason: ALTCHOICE

## 2019-05-14 ASSESSMENT — ENCOUNTER SYMPTOMS
NAUSEA: 1
COUGH: 0
SHORTNESS OF BREATH: 0
VOMITING: 0
BACK PAIN: 0
COLOR CHANGE: 0
ABDOMINAL PAIN: 1
EYES NEGATIVE: 1

## 2019-05-14 ASSESSMENT — PAIN SCALES - GENERAL: PAINLEVEL_OUTOF10: 5

## 2019-05-14 ASSESSMENT — PAIN DESCRIPTION - PAIN TYPE: TYPE: ACUTE PAIN

## 2019-05-14 ASSESSMENT — PAIN DESCRIPTION - LOCATION: LOCATION: ABDOMEN

## 2019-05-14 NOTE — ED PROVIDER NOTES
[facility]  ED  eMERGENCY dEPARTMENT eNCOUnter        Pt Name: James Martinez  MRN: [id number]  Armslilliamgfkevin 1932  Date of evaluation: 2019  Provider: Ailyn Cardozo PA-C  PCP: Troy Echevarria MD  ED Attending:     History provided by the patient    CHIEF COMPLAINT:     Chief Complaint   Patient presents with    Abdominal Pain     for about a month just getting worse, had EGD with nothing found, loss of appetite no vomiting or diarrhea        HISTORY OF PRESENT ILLNESS:      James Martinez is a 80 y.o. female who arrives to the ED by private vehicle with a family member. The patient is here reporting upper abdominal pain that has been going on for over 6 months. She was actually seen in the ED for it in 2018. She had labs and CT imaging of the abdomen and pelvis done at that time. The patient has also followed with Dr. Lennox Xiao and reportedly underwent EGD less than 1 month ago. She states the pain just seems to be getting worse. She describes being gassy and belching. She describes exacerbated pain after eating. Sometimes she feels better after drinking milk. She has had a decreased appetite. She however has not had any vomiting. She denies bowel changes or urinary symptoms. She expresses frustration feeling this way and has tried multiple medications including things like Zantac, Prilosec, Reglan and Bentyl but nothing really seems to help. She has remotely underwent D&C and  section. She denies any other past abdominal or pelvic surgeries. She questions whether her gallbladder could be the problem. Nursing Notes were reviewed     REVIEW OF SYSTEMS:     Review of Systems   Constitutional: Positive for appetite change. Negative for activity change, chills and fever. HENT: Negative. Eyes: Negative. Respiratory: Negative for cough and shortness of breath. Cardiovascular: Negative for chest pain.    Gastrointestinal: Positive for abdominal pain and Margarita Rucker Ast hcl]    FAMILY HISTORY:       Family History   Problem Relation Age of Onset   Corbin Cancer Mother 80        colon    Diabetes Brother     Heart Disease Brother     High Blood Pressure Brother           SOCIAL HISTORY:       Social History     Socioeconomic History    Marital status:      Spouse name: None    Number of children: None    Years of education: None    Highest education level: None   Occupational History    None   Social Needs    Financial resource strain: None    Food insecurity:     Worry: None     Inability: None    Transportation needs:     Medical: None     Non-medical: None   Tobacco Use    Smoking status: Never Smoker    Smokeless tobacco: Never Used   Substance and Sexual Activity    Alcohol use: No    Drug use: No    Sexual activity: Never   Lifestyle    Physical activity:     Days per week: None     Minutes per session: None    Stress: None   Relationships    Social connections:     Talks on phone: None     Gets together: None     Attends Bahai service: None     Active member of club or organization: None     Attends meetings of clubs or organizations: None     Relationship status: None    Intimate partner violence:     Fear of current or ex partner: None     Emotionally abused: None     Physically abused: None     Forced sexual activity: None   Other Topics Concern    None   Social History Narrative    Grandson lives w/ her in house. She is active and independent       SCREENINGS:             PHYSICAL EXAM:       ED Triage Vitals   BP Temp Temp Source Pulse Resp SpO2 Height Weight   05/14/19 1906 05/14/19 1905 05/14/19 1905 05/14/19 1905 05/14/19 1905 05/14/19 1905 -- 05/14/19 1906   (!) 161/76 97.4 °F (36.3 °C) Oral 67 18 96 %  157 lb (71.2 kg)       Physical Exam    CONSTITUTIONAL: Awake and alert. Cooperative. Well-developed. Well-nourished. Non-toxic. No acute distress. HENT: Normocephalic. Atraumatic.  External ears normal, without Alb 4.2 3.4 - 5.0 g/dL    Albumin/Globulin Ratio 1.4 1.1 - 2.2    Total Bilirubin 0.6 0.0 - 1.0 mg/dL    Alkaline Phosphatase 66 40 - 129 U/L    ALT 21 10 - 40 U/L    AST 32 15 - 37 U/L    Globulin 2.9 g/dL   Lipase   Result Value Ref Range    Lipase 65.0 (H) 13.0 - 60.0 U/L   Urinalysis   Result Value Ref Range    Color, UA Yellow Straw/Yellow    Clarity, UA Clear Clear    Glucose, Ur Negative Negative mg/dL    Bilirubin Urine Negative Negative    Ketones, Urine Negative Negative mg/dL    Specific Gravity, UA <=1.005 1.005 - 1.030    Blood, Urine Negative Negative    pH, UA 5.5 5.0 - 8.0    Protein, UA Negative Negative mg/dL    Urobilinogen, Urine 0.2 <2.0 E.U./dL    Nitrite, Urine Negative Negative    Leukocyte Esterase, Urine Negative Negative    Microscopic Examination Not Indicated     Urine Type Not Specified    Troponin   Result Value Ref Range    Troponin <0.01 <0.01 ng/mL   EKG 12 Lead   Result Value Ref Range    Ventricular Rate 70 BPM    Atrial Rate 70 BPM    P-R Interval 202 ms    QRS Duration 90 ms    Q-T Interval 390 ms    QTc Calculation (Bazett) 421 ms    P Axis 16 degrees    R Axis -38 degrees    T Axis 5 degrees    Diagnosis       ** Poor data quality, interpretation may be adversely affectedNormal sinus rhythmLeft axis deviationNonspecific ST abnormalityAbnormal ECGWhen compared with ECG of 22-JUN-2011 00:49,No significant change was found         RADIOLOGY:  All x-ray studies areviewed/reviewed by me. Formal interpretations per the radiologist are as follows:    Us Gallbladder Ruq    Result Date: 5/14/2019  EXAMINATION: RIGHT UPPER QUADRANT ULTRASOUND 5/14/2019 8:44 pm COMPARISON: 10/08/2018 HISTORY: ORDERING SYSTEM PROVIDED HISTORY: upper abdominal pain, nausea TECHNOLOGIST PROVIDED HISTORY: Ordering Physician Provided Reason for Exam: upper abdominal pain, nausea Acuity: Unknown Type of Exam: Unknown FINDINGS: LIVER:  The liver is normal in size and echotexture.   There is no ductal dilatation follow up with that with her age, I'm not sure how aggressively she will be able to be treated in terms of cholecystectomy. She is tried multiple medications for her stomach as mentioned in my HPI. She does have an established GI doctor. Tonight we will try a prescription of Carafate. She is given instructions on dietary changes she can make that may help with her symptoms. All questions answered prior to discharge. I estimate there is LOW risk for AAA, ACUTE APPENDICITIS, PYELONEPHRITIS, BOWEL OBSTRUCTION, CHOLECYSTITIS, DIVERTICULITIS, INCARCERATED HERNIA, PANCREATITIS, PERFORATED BOWEL or ULCER, thus I consider the discharge disposition reasonable. Also, there is no evidence or peritonitis, sepsis, or toxicity. Rafa Barreto and I have discussed the diagnosis and risks, and we agree with discharging home to follow-up with their primary doctor. We also discussed returning to the Emergency Department immediately if new or worsening symptoms occur. We have discussed the symptoms which are most concerning (e.g., bloody stool, fever, changing or worsening pain, vomiting) that necessitate immediate return. FINAL IMPRESSION:      1. Upper abdominal pain    2.  Gallbladder sludge          DISPOSITION/PLAN:   DISPOSITION     DISCHARGE    PATIENT REFERRED TO:  Husam Allison MD  [address]  Jose David Samano MD  700 [facility], [facility] [address]  [facility] [address], [address] [place] [address] [place]  [phone]    Schedule an appointment as soon as possible for a visit         DISCHARGE MEDICATIONS:  Discharge Medication List as of 5/14/2019  9:58 PM      START taking these medications    Details   sucralfate (CARAFATE) 1 GM/10ML suspension Take 10 mLs by mouth 4 times daily, Disp-1200 mL, R-0Print                        (Please note thatportions of this note were completed with a voice recognition program.  Efforts were made to edit the dictations, but occasionally words are mis-transcribed.)    Sharee Colón PA-C (electronicallysigned)              [place], [place]  05/14/19 7588

## 2019-05-15 LAB
EKG ATRIAL RATE: 70 BPM
EKG DIAGNOSIS: NORMAL
EKG P AXIS: 16 DEGREES
EKG P-R INTERVAL: 202 MS
EKG Q-T INTERVAL: 390 MS
EKG QRS DURATION: 90 MS
EKG QTC CALCULATION (BAZETT): 421 MS
EKG R AXIS: -38 DEGREES
EKG T AXIS: 5 DEGREES
EKG VENTRICULAR RATE: 70 BPM

## 2019-05-15 PROCEDURE — 93010 ELECTROCARDIOGRAM REPORT: CPT | Performed by: INTERNAL MEDICINE

## 2019-05-15 NOTE — ED NOTES
Verbal and written discharge instructions given. IV and telemetry monitor removed. Prescriptions given to patient. Patient in stable condition, discharged home with family.      Kodak Hlil RN  05/14/19 3413

## 2019-05-23 ENCOUNTER — HOSPITAL ENCOUNTER (OUTPATIENT)
Dept: NUCLEAR MEDICINE | Age: 84
Discharge: HOME | End: 2019-05-23
Payer: MEDICARE

## 2019-05-23 VITALS — HEIGHT: 65 IN | BODY MASS INDEX: 25.66 KG/M2 | WEIGHT: 154 LBS

## 2019-05-23 DIAGNOSIS — R10.13 EPIGASTRIC PAIN: ICD-10-CM

## 2019-05-23 PROCEDURE — 2580000003 HC RX 258: Performed by: PHYSICIAN ASSISTANT

## 2019-05-23 PROCEDURE — 6360000004 HC RX CONTRAST MEDICATION: Performed by: PHYSICIAN ASSISTANT

## 2019-05-23 PROCEDURE — A9502 TC99M TETROFOSMIN: HCPCS | Performed by: PHYSICIAN ASSISTANT

## 2019-05-23 PROCEDURE — 78227 HEPATOBIL SYST IMAGE W/DRUG: CPT

## 2019-05-23 PROCEDURE — 3430000000 HC RX DIAGNOSTIC RADIOPHARMACEUTICAL: Performed by: PHYSICIAN ASSISTANT

## 2019-05-23 RX ADMIN — SODIUM CHLORIDE 1.4 MCG: 9 INJECTION, SOLUTION INTRAVENOUS at 09:04

## 2019-05-23 RX ADMIN — TETROFOSMIN 6.4 MILLICURIE: 1.38 INJECTION, POWDER, LYOPHILIZED, FOR SOLUTION INTRAVENOUS at 07:54

## 2019-10-21 ENCOUNTER — OFFICE VISIT (OUTPATIENT)
Dept: FAMILY MEDICINE CLINIC | Age: 84
End: 2019-10-21
Payer: MEDICARE

## 2019-10-21 VITALS
WEIGHT: 150 LBS | HEIGHT: 65 IN | DIASTOLIC BLOOD PRESSURE: 70 MMHG | SYSTOLIC BLOOD PRESSURE: 130 MMHG | OXYGEN SATURATION: 96 % | HEART RATE: 68 BPM | BODY MASS INDEX: 24.99 KG/M2

## 2019-10-21 DIAGNOSIS — E78.5 HYPERLIPIDEMIA, UNSPECIFIED HYPERLIPIDEMIA TYPE: ICD-10-CM

## 2019-10-21 DIAGNOSIS — Z23 NEED FOR INFLUENZA VACCINATION: ICD-10-CM

## 2019-10-21 DIAGNOSIS — I25.9 CHRONIC ISCHEMIC HEART DISEASE: ICD-10-CM

## 2019-10-21 DIAGNOSIS — I10 ESSENTIAL HYPERTENSION: Primary | ICD-10-CM

## 2019-10-21 PROCEDURE — 1036F TOBACCO NON-USER: CPT | Performed by: FAMILY MEDICINE

## 2019-10-21 PROCEDURE — G8420 CALC BMI NORM PARAMETERS: HCPCS | Performed by: FAMILY MEDICINE

## 2019-10-21 PROCEDURE — G8427 DOCREV CUR MEDS BY ELIG CLIN: HCPCS | Performed by: FAMILY MEDICINE

## 2019-10-21 PROCEDURE — G8598 ASA/ANTIPLAT THER USED: HCPCS | Performed by: FAMILY MEDICINE

## 2019-10-21 PROCEDURE — G8482 FLU IMMUNIZE ORDER/ADMIN: HCPCS | Performed by: FAMILY MEDICINE

## 2019-10-21 PROCEDURE — 90653 IIV ADJUVANT VACCINE IM: CPT | Performed by: FAMILY MEDICINE

## 2019-10-21 PROCEDURE — 4040F PNEUMOC VAC/ADMIN/RCVD: CPT | Performed by: FAMILY MEDICINE

## 2019-10-21 PROCEDURE — 1123F ACP DISCUSS/DSCN MKR DOCD: CPT | Performed by: FAMILY MEDICINE

## 2019-10-21 PROCEDURE — 99214 OFFICE O/P EST MOD 30 MIN: CPT | Performed by: FAMILY MEDICINE

## 2019-10-21 PROCEDURE — G0008 ADMIN INFLUENZA VIRUS VAC: HCPCS | Performed by: FAMILY MEDICINE

## 2019-10-21 PROCEDURE — 1090F PRES/ABSN URINE INCON ASSESS: CPT | Performed by: FAMILY MEDICINE

## 2019-10-21 ASSESSMENT — PATIENT HEALTH QUESTIONNAIRE - PHQ9
SUM OF ALL RESPONSES TO PHQ QUESTIONS 1-9: 0
2. FEELING DOWN, DEPRESSED OR HOPELESS: 0
SUM OF ALL RESPONSES TO PHQ QUESTIONS 1-9: 0
SUM OF ALL RESPONSES TO PHQ9 QUESTIONS 1 & 2: 0
1. LITTLE INTEREST OR PLEASURE IN DOING THINGS: 0

## 2019-12-04 ENCOUNTER — OFFICE VISIT (OUTPATIENT)
Dept: FAMILY MEDICINE CLINIC | Age: 84
End: 2019-12-04
Payer: MEDICARE

## 2019-12-04 VITALS
SYSTOLIC BLOOD PRESSURE: 118 MMHG | WEIGHT: 164.4 LBS | HEART RATE: 71 BPM | HEIGHT: 65 IN | DIASTOLIC BLOOD PRESSURE: 64 MMHG | OXYGEN SATURATION: 95 % | RESPIRATION RATE: 14 BRPM | BODY MASS INDEX: 27.39 KG/M2

## 2019-12-04 DIAGNOSIS — Z00.00 ROUTINE GENERAL MEDICAL EXAMINATION AT A HEALTH CARE FACILITY: Primary | ICD-10-CM

## 2019-12-04 PROCEDURE — G8482 FLU IMMUNIZE ORDER/ADMIN: HCPCS | Performed by: FAMILY MEDICINE

## 2019-12-04 PROCEDURE — 4040F PNEUMOC VAC/ADMIN/RCVD: CPT | Performed by: FAMILY MEDICINE

## 2019-12-04 PROCEDURE — G0439 PPPS, SUBSEQ VISIT: HCPCS | Performed by: FAMILY MEDICINE

## 2019-12-04 PROCEDURE — 1123F ACP DISCUSS/DSCN MKR DOCD: CPT | Performed by: FAMILY MEDICINE

## 2019-12-04 PROCEDURE — G8598 ASA/ANTIPLAT THER USED: HCPCS | Performed by: FAMILY MEDICINE

## 2019-12-04 ASSESSMENT — PATIENT HEALTH QUESTIONNAIRE - PHQ9
SUM OF ALL RESPONSES TO PHQ QUESTIONS 1-9: 0
SUM OF ALL RESPONSES TO PHQ QUESTIONS 1-9: 0

## 2019-12-04 ASSESSMENT — LIFESTYLE VARIABLES: HOW OFTEN DO YOU HAVE A DRINK CONTAINING ALCOHOL: 0

## 2020-11-03 ENCOUNTER — OFFICE VISIT (OUTPATIENT)
Dept: FAMILY MEDICINE CLINIC | Age: 85
End: 2020-11-03
Payer: MEDICARE

## 2020-11-03 VITALS
DIASTOLIC BLOOD PRESSURE: 60 MMHG | BODY MASS INDEX: 25.29 KG/M2 | TEMPERATURE: 97.8 F | SYSTOLIC BLOOD PRESSURE: 142 MMHG | HEIGHT: 65 IN | WEIGHT: 151.8 LBS | OXYGEN SATURATION: 97 % | HEART RATE: 57 BPM | RESPIRATION RATE: 19 BRPM

## 2020-11-03 LAB
A/G RATIO: 2 (ref 1.1–2.2)
ALBUMIN SERPL-MCNC: 4.3 G/DL (ref 3.4–5)
ALP BLD-CCNC: 64 U/L (ref 40–129)
ALT SERPL-CCNC: 17 U/L (ref 10–40)
ANION GAP SERPL CALCULATED.3IONS-SCNC: 12 MMOL/L (ref 3–16)
AST SERPL-CCNC: 28 U/L (ref 15–37)
BILIRUB SERPL-MCNC: 0.7 MG/DL (ref 0–1)
BUN BLDV-MCNC: 16 MG/DL (ref 7–20)
CALCIUM SERPL-MCNC: 10.5 MG/DL (ref 8.3–10.6)
CHLORIDE BLD-SCNC: 106 MMOL/L (ref 99–110)
CHOLESTEROL, TOTAL: 147 MG/DL (ref 0–199)
CO2: 25 MMOL/L (ref 21–32)
CREAT SERPL-MCNC: 0.8 MG/DL (ref 0.6–1.2)
GFR AFRICAN AMERICAN: >60
GFR NON-AFRICAN AMERICAN: >60
GLOBULIN: 2.1 G/DL
GLUCOSE BLD-MCNC: 90 MG/DL (ref 70–99)
HDLC SERPL-MCNC: 48 MG/DL (ref 40–60)
LDL CHOLESTEROL CALCULATED: 77 MG/DL
POTASSIUM SERPL-SCNC: 4.7 MMOL/L (ref 3.5–5.1)
SODIUM BLD-SCNC: 143 MMOL/L (ref 136–145)
TOTAL PROTEIN: 6.4 G/DL (ref 6.4–8.2)
TRIGL SERPL-MCNC: 112 MG/DL (ref 0–150)
VLDLC SERPL CALC-MCNC: 22 MG/DL

## 2020-11-03 PROCEDURE — 1036F TOBACCO NON-USER: CPT | Performed by: FAMILY MEDICINE

## 2020-11-03 PROCEDURE — G0008 ADMIN INFLUENZA VIRUS VAC: HCPCS | Performed by: FAMILY MEDICINE

## 2020-11-03 PROCEDURE — G8417 CALC BMI ABV UP PARAM F/U: HCPCS | Performed by: FAMILY MEDICINE

## 2020-11-03 PROCEDURE — G8484 FLU IMMUNIZE NO ADMIN: HCPCS | Performed by: FAMILY MEDICINE

## 2020-11-03 PROCEDURE — 99214 OFFICE O/P EST MOD 30 MIN: CPT | Performed by: FAMILY MEDICINE

## 2020-11-03 PROCEDURE — 36415 COLL VENOUS BLD VENIPUNCTURE: CPT | Performed by: FAMILY MEDICINE

## 2020-11-03 PROCEDURE — 1090F PRES/ABSN URINE INCON ASSESS: CPT | Performed by: FAMILY MEDICINE

## 2020-11-03 PROCEDURE — G8427 DOCREV CUR MEDS BY ELIG CLIN: HCPCS | Performed by: FAMILY MEDICINE

## 2020-11-03 PROCEDURE — 1123F ACP DISCUSS/DSCN MKR DOCD: CPT | Performed by: FAMILY MEDICINE

## 2020-11-03 PROCEDURE — 4040F PNEUMOC VAC/ADMIN/RCVD: CPT | Performed by: FAMILY MEDICINE

## 2020-11-03 PROCEDURE — 90694 VACC AIIV4 NO PRSRV 0.5ML IM: CPT | Performed by: FAMILY MEDICINE

## 2020-11-03 RX ORDER — PANTOPRAZOLE SODIUM 40 MG/1
40 GRANULE, DELAYED RELEASE ORAL
COMMUNITY
End: 2022-04-06

## 2020-11-03 ASSESSMENT — PATIENT HEALTH QUESTIONNAIRE - PHQ9
SUM OF ALL RESPONSES TO PHQ QUESTIONS 1-9: 0
SUM OF ALL RESPONSES TO PHQ QUESTIONS 1-9: 0
1. LITTLE INTEREST OR PLEASURE IN DOING THINGS: 0
2. FEELING DOWN, DEPRESSED OR HOPELESS: 0
SUM OF ALL RESPONSES TO PHQ9 QUESTIONS 1 & 2: 0
SUM OF ALL RESPONSES TO PHQ QUESTIONS 1-9: 0

## 2020-11-03 NOTE — PROGRESS NOTES
SUBJECTIVE:  Olivia Harris is a 80 y.o. female who presents for evaluation of ischemic heart disease, hypertension and hyperlipidemia. She indicates that she is feeling well and denies any symptoms referable to her elevated blood pressure. Specifically denies chest pain, palpitations, dyspnea, orthopnea, PND or peripheral edema or neuro sx. No anorexia, , or leg cramps noted. Current medication regimen is as listed below. She denies any side effects of medication, and has been taking it regularly. Lab Results   Component Value Date    LDLCALC 98 01/03/2017       Overall patient is doing well. She has a birthday this week she will be 80. She was outside working in her yard yesterday picking up leaves. She is completely independent, lives at our house, her grandson lives with her as well. She is totally independent. She sees cardiology on a yearly basis. They prescribe her medicines but do not do her blood work. She also sees GI who manages her upset stomach with the pantoprazole. Patient does have a lesion on her right calf she wants evaluated    Current Outpatient Medications   Medication Sig Dispense Refill    pantoprazole sodium (PROTONIX) 40 MG PACK packet Take 40 mg by mouth every morning (before breakfast)      isosorbide mononitrate (IMDUR) 30 MG extended release tablet Take 15 mg by mouth       aspirin 81 MG tablet Take 81 mg by mouth daily      amLODIPine (NORVASC) 2.5 MG tablet Take 2.5 mg by mouth daily.  metoprolol (LOPRESSOR) 50 MG tablet Take 50 mg by mouth 2 times daily.  rosuvastatin (CRESTOR) 5 MG tablet Take 1 tablet by mouth daily. 60 tablet 0     No current facility-administered medications for this visit.         Allergies   Allergen Reactions    Carbocaine [Mepivacaine Hcl] Other (See Comments)     Dizzy, almost pass out    Prevnar 13 [Pneumococcal 13-Louisa Conj Vacc] Swelling     Localized reaction    Zofran [Ondansetron Hcl] Other (See Comments)     Pt states it caused her stomach to \"burn\"       Social History     Tobacco Use    Smoking status: Never Smoker    Smokeless tobacco: Never Used   Substance Use Topics    Alcohol use: No       OBJECTIVE:   BP (!) 142/60   Pulse 57   Temp 97.8 °F (36.6 °C) (Temporal)   Resp 19   Ht 5' 5\" (1.651 m)   Wt 151 lb 12.8 oz (68.9 kg)   SpO2 97%   Breastfeeding No   BMI 25.26 kg/m²   NAD  Neck no bruit or JVD  S1 and S2 normal, no murmurs, clicks, gallops or rubs. Regular rate and rhythm. Chest is clear; no wheezes or rales. No edema or JVD. Neuro alert, no CN or motor deficits  Psych nl mood, thought and judgement  Skin exam she has a nodule about 5 mm in diameter nonerythematous on her posterior calf  ASSESSMENT:   Diagnosis Orders   1. Essential hypertension, stable continue same medicine Comprehensive Metabolic Panel   2. Need for influenza vaccination  INFLUENZA, QUADV, ADJUVANTED, 65 YRS =, IM, PF, PREFILL SYR, 0.5ML (FLUAD)   3. Chronic ischemic heart disease, plan per cardiology    4. Hyperlipidemia, unspecified hyperlipidemia type  Lipid Panel, labs pending   5. Skin lesion of right leg     Skin lesion, patient has appointment with dermatology later this month she will show this area to the dermatologist  Plan:  1)  Medication: continue current medication regimen unchanged  2)  Recheck in 6 months, sooner should new symptoms or problems arise. 3) Nate Gallegos received counseling on the following healthy behaviors: exercise and medication adherence        Discussed use, benefit, and side effects of prescribed medications. Barriers to medication compliance addressed. All patient questions answered. Pt voiced understanding.

## 2020-11-03 NOTE — PROGRESS NOTES
Vaccine Information Sheet, \"Influenza - Inactivated\"  given to Oscar Mae, or parent/legal guardian of  Oscar Mae and verbalized understanding. Patient responses:    Have you ever had a reaction to a flu vaccine? No  Do you have any current illness? No  Have you ever had Guillian Watson Syndrome? No  Do you have a serious allergy to any of the follow: Neomycin, Polymyxin, Thimerosal, eggs or egg products? No    Flu vaccine given per order. Please see immunization tab. Risks and benefits explained. Current VIS given.

## 2020-12-09 ENCOUNTER — VIRTUAL VISIT (OUTPATIENT)
Dept: FAMILY MEDICINE CLINIC | Age: 85
End: 2020-12-09
Payer: MEDICARE

## 2020-12-09 VITALS — BODY MASS INDEX: 25.16 KG/M2 | WEIGHT: 151 LBS | TEMPERATURE: 98 F | HEIGHT: 65 IN

## 2020-12-09 PROCEDURE — G0439 PPPS, SUBSEQ VISIT: HCPCS | Performed by: FAMILY MEDICINE

## 2020-12-09 PROCEDURE — G8484 FLU IMMUNIZE NO ADMIN: HCPCS | Performed by: FAMILY MEDICINE

## 2020-12-09 PROCEDURE — 1123F ACP DISCUSS/DSCN MKR DOCD: CPT | Performed by: FAMILY MEDICINE

## 2020-12-09 PROCEDURE — 4040F PNEUMOC VAC/ADMIN/RCVD: CPT | Performed by: FAMILY MEDICINE

## 2020-12-09 ASSESSMENT — PATIENT HEALTH QUESTIONNAIRE - PHQ9
SUM OF ALL RESPONSES TO PHQ QUESTIONS 1-9: 0
1. LITTLE INTEREST OR PLEASURE IN DOING THINGS: 0
SUM OF ALL RESPONSES TO PHQ9 QUESTIONS 1 & 2: 0
2. FEELING DOWN, DEPRESSED OR HOPELESS: 0
SUM OF ALL RESPONSES TO PHQ QUESTIONS 1-9: 0
SUM OF ALL RESPONSES TO PHQ QUESTIONS 1-9: 0

## 2020-12-09 ASSESSMENT — LIFESTYLE VARIABLES: HOW OFTEN DO YOU HAVE A DRINK CONTAINING ALCOHOL: 0

## 2020-12-09 NOTE — PROGRESS NOTES
hysteroscopy, myosure    EYE SURGERY Right 4/2/2013    cataract w/ IOL    EYE SURGERY Left 5/21/13    cataract with IOL implant    TONSILLECTOMY         Family History   Problem Relation Age of Onset    Cancer Mother 80        colon    Diabetes Brother     Heart Disease Brother     High Blood Pressure Brother        CareTeam (Including outside providers/suppliers regularly involved in providing care):   Patient Care Team:  Claude Espinoza MD as PCP - [id number] [address] MD as PCP [facility]    Wt Readings from Last 3 Encounters:   12/09/20 151 lb (68.5 kg)   11/03/20 151 lb 12.8 oz (68.9 kg)   12/04/19 164 lb 6.4 oz (74.6 kg)     Vitals:    12/09/20 1326   Temp: 98 °F (36.7 °C)   Weight: 151 lb (68.5 kg)   Height: 5' 5\" (1.651 m)     Body mass index is 25.13 kg/m². Based upon direct observation of the patient, evaluation of cognition reveals recent and remote memory intact. Patient's complete Health Risk Assessment and screening values have been reviewed and are found in Flowsheets. The following problems were reviewed today and where indicated follow up appointments were made and/or referrals ordered. Positive Risk Factor Screenings with Interventions:     General Health and ACP:  General  In general, how would you say your health is?: Good  In the past 7 days, have you experienced any of the following?  New or Increased Pain, New or Increased Fatigue, Loneliness, Social Isolation, Stress or Anger?: None of These  Do you get the social and emotional support that you need?: Yes  Do you have a Living Will?: Yes  Advance Directives     Power of  Living Will ACP-Advance Directive ACP-Power of     Not on File Not on File Filed Filed      General Health Risk Interventions:  · No Living Will: Advance Care Planning addressed with patient today    Personalized Preventive Plan   Current Health Maintenance Status  Immunization History   Administered Date(s) Administered    Influenza A ([id number]) Vaccine PF IM 12/16/2009    Influenza Vaccine, unspecified formulation 10/09/2013, 09/03/2014, 01/03/2017    Influenza Virus Vaccine 10/06/2011, 09/27/2012, 10/09/2013    Influenza, High Dose (Fluzone 65 yrs and older) 09/03/2014, 11/05/2015, 01/03/2017, 10/17/2017, 09/24/2018    Influenza, Quadv, adjuvanted, 65 yrs +, IM, PF (Fluad) 11/03/2020    Influenza, Triv, inactivated, subunit, adjuvanted, IM (Fluad 65 yrs and older) 10/21/2019    Pneumococcal Conjugate 13-valent ([id number]) 09/02/2015    Pneumococcal Polysaccharide ([id number]) 09/18/2013        Health Maintenance   Topic Date Due    DTaP/Tdap/Td vaccine (1 - Tdap) 11/08/1951    Shingles Vaccine (1 of 2) 11/08/1982    Annual Wellness Visit (AWV)  06/19/2019    Lipid screen  11/03/2021    Flu vaccine  Completed    Hepatitis A vaccine  Aged Out    Hepatitis B vaccine  Aged Out    Hib vaccine  Aged Out    Meningococcal (ACWY) vaccine  Aged Out     Recommendations for [employer] Due: see orders and patient instructions/AVS.  . Recommended screening schedule for the next 5-10 years is provided to the patient in written form: see Patient Instructions/AVS.    Marlene BERRY LPN, [birth year], performed the documented evaluation under the direct supervision of the attending physician. Brien Dye is a 80 y.o. female being evaluated by a Virtual Visit (phone visit) encounter to address concerns as mentioned above. A caregiver was present when appropriate. Due to this being a TeleHealth encounter (During [facility]- public health emergency), evaluation of the following organ systems was limited: Vitals/Constitutional/EENT/Resp/CV/GI//MS/Neuro/Skin/Heme-Lymph-Imm.   Pursuant to the emergency declaration under the [facility] waiver authority and the [employer] Act, this Virtual Visit was conducted with patient's (and/or legal guardian's) consent, to reduce the patient's risk of exposure to COVID-19 and provide necessary medical care. The patient (and/or legal guardian) has also been advised to contact this office for worsening conditions or problems, and seek emergency medical treatment and/or call 911 if deemed necessary. Patient identification was verified at the start of the visit: Yes    Total time spent for this encounter: 10 min    Services were provided through a video synchronous discussion virtually to substitute for in-person clinic visit. Patient and provider were located at their individual homes. --Sher Méndez LPN on 19/4/6459 at 9:07 PM    An electronic signature was used to authenticate this note. This encounter was performed under my, Asa De La Rosa, direct supervision, 12/9/2020.

## 2020-12-09 NOTE — PATIENT INSTRUCTIONS
disease, and certain medicines. But, there are steps you can take to sharpen your mind and help preserve your memory. Challenge Your Brain   Regularly challenging your mind may help keeps it in top shape. Good mental exercises include:   Crossword puzzlesUse a dictionary if you need it; you will learn more that way. Brainteasers Try some! Crafts, such as wood working and sewing   Hobbies, such as gardening and building model airplanes   SocializingVisit old friends or join groups to meet new ones. Reading   Learning a new language   Taking a class, whether it be art history or marybeth chi   TravelingExperience the food, history, and culture of your destination   Learning to use a computer   Going to museums, the theater, or thought-provoking movies   Changing things in your daily life, such as reversing your pattern in the grocery store or brushing your teeth using your nondominant hand   Use Memory Aids   There is no need to remember every detail on your own. These memory aids can help:   Calendars and day planners   Electronic organizers to store all sorts of helpful informationThese devices can \"beep\" to remind you of appointments. A book of days to record birthdays, anniversaries, and other occasions that occur on the same date every year   Detailed \"to-do\" lists and strategically placed sticky notes   Quick \"study\" sessionsBefore a gathering, review who will be there so their names will be fresh in your mind. Establish routinesFor example, keep your keys, wallet, and umbrella in the same place all the time or take medicine with your 8:00 AM glass of juice   Live a Healthy Life   Many actions that will keep your body strong will do the same for your mind. For example:   Talk to Your Doctor About Herbs and Supplements    Malnutrition and vitamin deficiencies can impair your mental function. For example, vitamin B12 deficiency can cause a range of symptoms, including confusion.  But, what if your nutritional needs are being met? Can herbs and supplements still offer a benefit? Researchers have investigated a range of natural remedies, such as ginkgo , ginseng , and the supplement phosphatidylserine (PS). So far, though, the evidence is inconsistent as to whether these products can improve memory or thinking. If you are interested in taking herbs and supplements, talk to your doctor first because they may interact with other medicines that you are taking. Exercise Regularly    Among the many benefits of regular exercise are increased blood flow to the brain and decreased risk of certain diseases that can interfere with memory function. One study found that even moderate exercise has a beneficial effect. Examples of \"moderate\" exercise include:   Playing 18 holes of golf once a week, without a cart   Playing tennis twice a week   Walking one mile per day   Manage Stress    It can be tough to remember what is important when your mind is cluttered. Make time for relaxation. Choose activities that calm you down, and make it routine. Manage Chronic Conditions    Side effects of high blood pressure , diabetes, and heart disease can interfere with mental function. Many of the lifestyle steps discussed here can help manage these conditions. Strive to eat a healthy diet, exercise regularly, get stress under control, and follow your doctor's advice for your condition. Minimize Medications    Talk to your doctor about the medicines that you take. Some may be unnecessary. Also, healthy lifestyle habits may lower the need for certain drugs. Last Reviewed: April 2010 Oscar Arambula MD   Updated: 4/13/2010   ·        Ruf[address]  What is a living will? A living will, also called a declaration, is a legal form. It tells your family and your doctor your wishes when you can't speak for yourself.  It's used by the health professionals who will treat you as you near the end of your life or if you get seriously hurt or ill. If you put your wishes in writing, your loved ones and others will know what kind of care you want. They won't need to guess. This can ease your mind and be helpful to others. And you can change or cancel your living will at any time. A living will is not the same as an estate or property will. An estate will explains what you want to happen with your money and property after you die. How do you use it? A living will is used to describe the kinds of treatment or life support you want as you near the end of your life or if you get seriously hurt or ill. Keep these facts in mind about living landis. Your living will is used only if you can't speak or make decisions for yourself. Most often, one or more doctors must certify that you can't speak or decide for yourself before your living will takes effect. If you get better and can speak for yourself again, you can accept or refuse any treatment. It doesn't matter what you said in your living will. Some states may limit your right to refuse treatment in certain cases. For example, you may need to clearly state in your living will that you don't want artificial hydration and nutrition, such as being fed through a tube. Is a living will a legal document? A living will is a legal document. Each state has its own laws about living landis. And a living will may be called something else in your state. Here are some things to know about living landis. You don't need an  to complete a living will. But legal advice can be helpful if your state's laws are unclear. It can also help if your health history is complicated or your family can't agree on what should be in your living will. You can change your living will at any time. Some people find that their wishes about end-of-life care change as their health changes. If you make big changes to your living will, complete a new form.   If you move to another state, make sure that your living will is legal in the state where you now live. In most cases, doctors will respect your wishes even if you have a form from a different state. You might use a universal form that has been approved by many states. This kind of form can sometimes be filled out and stored online. Your digital copy will then be available wherever you have a connection to the internet. The doctors and nurses who need to treat you can find it right away. Your state may offer an online registry. This is another place where you can store your living will online. It's a good idea to get your living will notarized. This means using a person called a [employer] to watch two people sign, or witness, your living will. What should you know when you create a living will? Here are some questions to ask yourself as you make your living will:  Do you know enough about life support methods that might be used? If not, talk to your doctor so you know what might be done if you can't breathe on your own, your heart stops, or you can't swallow. What things would you still want to be able to do after you receive life-support methods? Would you want to be able to walk? To speak? To eat on your own? To live without the help of machines? Do you want certain Evangelical practices performed if you become very ill? If you have a choice, where do you want to be cared for? In your home? At a hospital or nursing home? If you have a choice at the end of your life, where would you prefer to die? At home? In a hospital or nursing home? Somewhere else? Would you prefer to be buried or cremated? Do you want your organs to be donated after you die? What should you do with your living will? Make sure that your family members and your health care agent have copies of your living will (also called a declaration). Give your doctor a copy of your living will. Ask him or her to keep it as part of your medical record.  If you have more than one doctor, make sure that each one has a copy. Put a copy of your living will where it can be easily found. For example, some people may put a copy on their refrigerator door. If you are using a digital copy, be sure your doctor, family members, and health care agent know how to find and access it. Where can you learn more? Go to [email or web address] and sign in to your [employer] account. Enter S048 in the [employer] box to learn more about \"Learning About Living Perroy. \"     If you do not have an account, please click on the \"Sign Up Now\" link. Current as of: December 9, 2019               Content Version: 12.6  © 7175-2754 [employer]. Care instructions adapted under license by [facility] ([facility]). If you have questions about a medical condition or this instruction, always ask your healthcare professional. [address] any warranty or liability for your use of this information.     ·

## 2021-01-25 ENCOUNTER — TELEPHONE (OUTPATIENT)
Dept: FAMILY MEDICINE CLINIC | Age: 86
End: 2021-01-25
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
## 2021-01-25 NOTE — TELEPHONE ENCOUNTER
I called the pt to see if she had any ER visits other than at a [address] [facility] facility. She states that she has only been at [address] [facility]. I looked at her ER visit from 5/14/19 and 10/8/18 but did not see where she was told she had lung nodules. I asked the pt if she was only wanting to know if she should have the vaccine and she stated yes. Did I miss something in her chart about her having nodules? She should get the vaccine, correct?

## 2021-01-28 ENCOUNTER — IMMUNIZATION (OUTPATIENT)
Dept: PRIMARY CARE CLINIC | Age: 86
End: 2021-01-28
Payer: MEDICARE

## 2021-01-28 PROCEDURE — 0011A COVID-19, MODERNA VACCINE 100MCG/0.5ML DOSE: CPT | Performed by: FAMILY MEDICINE

## 2021-01-28 PROCEDURE — 91301 COVID-19, MODERNA VACCINE 100MCG/0.5ML DOSE: CPT | Performed by: FAMILY MEDICINE

## 2021-02-25 ENCOUNTER — IMMUNIZATION (OUTPATIENT)
Dept: PRIMARY CARE CLINIC | Age: 86
End: 2021-02-25
Payer: MEDICARE

## 2021-02-25 PROCEDURE — 0012A COVID-19, MODERNA VACCINE 100MCG/0.5ML DOSE: CPT | Performed by: FAMILY MEDICINE

## 2021-02-25 PROCEDURE — 91301 COVID-19, MODERNA VACCINE 100MCG/0.5ML DOSE: CPT | Performed by: FAMILY MEDICINE

## 2021-07-20 ENCOUNTER — OFFICE VISIT (OUTPATIENT)
Dept: FAMILY MEDICINE CLINIC | Age: 86
End: 2021-07-20
Payer: MEDICARE

## 2021-07-20 VITALS
OXYGEN SATURATION: 97 % | SYSTOLIC BLOOD PRESSURE: 150 MMHG | BODY MASS INDEX: 25.16 KG/M2 | WEIGHT: 151 LBS | TEMPERATURE: 97.4 F | HEIGHT: 65 IN | HEART RATE: 76 BPM | DIASTOLIC BLOOD PRESSURE: 76 MMHG

## 2021-07-20 DIAGNOSIS — M54.10 RADICULAR LEG PAIN: ICD-10-CM

## 2021-07-20 DIAGNOSIS — G62.9 NEUROPATHY: Primary | ICD-10-CM

## 2021-07-20 LAB — HBA1C MFR BLD: 5.1 %

## 2021-07-20 PROCEDURE — G8417 CALC BMI ABV UP PARAM F/U: HCPCS | Performed by: FAMILY MEDICINE

## 2021-07-20 PROCEDURE — 83036 HEMOGLOBIN GLYCOSYLATED A1C: CPT | Performed by: FAMILY MEDICINE

## 2021-07-20 PROCEDURE — 1123F ACP DISCUSS/DSCN MKR DOCD: CPT | Performed by: FAMILY MEDICINE

## 2021-07-20 PROCEDURE — 1036F TOBACCO NON-USER: CPT | Performed by: FAMILY MEDICINE

## 2021-07-20 PROCEDURE — 4040F PNEUMOC VAC/ADMIN/RCVD: CPT | Performed by: FAMILY MEDICINE

## 2021-07-20 PROCEDURE — 1090F PRES/ABSN URINE INCON ASSESS: CPT | Performed by: FAMILY MEDICINE

## 2021-07-20 PROCEDURE — 99213 OFFICE O/P EST LOW 20 MIN: CPT | Performed by: FAMILY MEDICINE

## 2021-07-20 PROCEDURE — G8427 DOCREV CUR MEDS BY ELIG CLIN: HCPCS | Performed by: FAMILY MEDICINE

## 2021-07-20 RX ORDER — IPRATROPIUM BROMIDE 21 UG/1
2 SPRAY, METERED NASAL EVERY 12 HOURS
Qty: 1 BOTTLE | Refills: 3 | Status: ON HOLD | OUTPATIENT
Start: 2021-07-20 | End: 2022-10-09 | Stop reason: HOSPADM

## 2021-07-20 SDOH — ECONOMIC STABILITY: FOOD INSECURITY: WITHIN THE PAST 12 MONTHS, THE FOOD YOU BOUGHT JUST DIDN'T LAST AND YOU DIDN'T HAVE MONEY TO GET MORE.: NEVER TRUE

## 2021-07-20 SDOH — ECONOMIC STABILITY: FOOD INSECURITY: WITHIN THE PAST 12 MONTHS, YOU WORRIED THAT YOUR FOOD WOULD RUN OUT BEFORE YOU GOT MONEY TO BUY MORE.: NEVER TRUE

## 2021-07-20 ASSESSMENT — PATIENT HEALTH QUESTIONNAIRE - PHQ9
SUM OF ALL RESPONSES TO PHQ QUESTIONS 1-9: 0
SUM OF ALL RESPONSES TO PHQ9 QUESTIONS 1 & 2: 0
SUM OF ALL RESPONSES TO PHQ QUESTIONS 1-9: 0
SUM OF ALL RESPONSES TO PHQ QUESTIONS 1-9: 0
2. FEELING DOWN, DEPRESSED OR HOPELESS: 0
1. LITTLE INTEREST OR PLEASURE IN DOING THINGS: 0

## 2021-07-20 ASSESSMENT — SOCIAL DETERMINANTS OF HEALTH (SDOH): HOW HARD IS IT FOR YOU TO PAY FOR THE VERY BASICS LIKE FOOD, HOUSING, MEDICAL CARE, AND HEATING?: NOT HARD AT ALL

## 2021-07-20 ASSESSMENT — ENCOUNTER SYMPTOMS: BACK PAIN: 1

## 2021-07-20 NOTE — PROGRESS NOTES
Patient:  Mika lyon 80 y.o. Rosana Lesches presents today with the following Chief Complaint(s):  Chief Complaint   Patient presents with    Leg Pain     Was diagnosed with bilateral leg neuropathy at [facility] and wants to know if she could be diabetic. Patient is here to discuss her radicular left leg pain. She has been working with orthopedics at [facility] [address] She states she had an MRI and EMG. They told her that she had neuropathy. They told her that the treatment would be physical therapy or injections. However they did not go into any details to further explain. Patient is upset and she wants to see a different orthopedic doctor. She is also concerned that she has diabetes because of this neuropathy. She has not had elevated blood sugars in the past.  She does see cardiology on a yearly basis for her CAD and hypertension and hyperlipidemia. Patient's last note from cardiology was reviewed. Patient does have a question about should she be taking isosorbide or not. She was asked to call cardiology to verify. In the body of the note it does not have that isosorbide listed. It had this medication being discontinued. Current Outpatient Medications   Medication Sig Dispense Refill    ipratropium (ATROVENT) 0.03 % nasal spray 2 sprays by Each Nostril route every 12 hours 1 Bottle 3    aspirin 81 MG tablet Take 81 mg by mouth daily      amLODIPine (NORVASC) 2.5 MG tablet Take 2.5 mg by mouth daily.  metoprolol (LOPRESSOR) 50 MG tablet Take 50 mg by mouth 2 times daily.  rosuvastatin (CRESTOR) 5 MG tablet Take 1 tablet by mouth daily.  60 tablet 0    pantoprazole sodium (PROTONIX) 40 MG PACK packet Take 40 mg by mouth every morning (before breakfast) (Patient not taking: Reported on 7/20/2021)      isosorbide mononitrate (IMDUR) 30 MG extended release tablet Take 15 mg by mouth  (Patient not taking: Reported on 7/20/2021)       No current facility-administered medications for this visit. Patients past medical history, surgical history, family history, medications and allergies were all reviewed and updated as appropriate today. Review of Systems   Constitutional: Negative for fever. Musculoskeletal: Positive for arthralgias, back pain and gait problem. Neurological: Negative for weakness. Physical Exam  Constitutional:       General: She is not in acute distress. Appearance: She is not ill-appearing. Cardiovascular:      Rate and Rhythm: Normal rate and regular rhythm. Heart sounds: Normal heart sounds. Neurological:      Mental Status: She is alert. Gait: Gait normal.           Vitals:    07/20/21 1404 07/20/21 1534   BP: (!) 150/76 (!) 150/76   Site: Left Upper Arm    Position: Sitting    Cuff Size: Medium Adult    Pulse: 76    Temp: 97.4 °F (36.3 °C)    TempSrc: Infrared    SpO2: 97%    Weight: 151 lb (68.5 kg)    Height: 5' 5\" (1.651 m)        Assessment/Plan:   Pretty Vega was seen today for leg pain. Diagnoses and all orders for this visit:    Neuropathy  -     POCT glycosylated hemoglobin (Hb A1C), A1c 5.1 completely normal patient was reassured she does not have diabetes    Radicular leg pain, she was referred to [facility] she was given information to schedule. I strongly encouraged her to have someone accompany her to her appointments so that somebody else can listen to the information and help her asked questions at the time of the office visit.     Other orders, ENT has prescribed this for her before her ENT is retired she asked for refill  -     ipratropium (ATROVENT) 0.03 % nasal spray; 2 sprays by Each Nostril route every 12 hours

## 2021-10-27 ENCOUNTER — IMMUNIZATION (OUTPATIENT)
Dept: FAMILY MEDICINE CLINIC | Age: 86
End: 2021-10-27
Payer: MEDICARE

## 2021-10-27 DIAGNOSIS — Z23 NEED FOR INFLUENZA VACCINATION: Primary | ICD-10-CM

## 2021-10-27 PROCEDURE — G0008 ADMIN INFLUENZA VIRUS VAC: HCPCS | Performed by: FAMILY MEDICINE

## 2021-10-27 PROCEDURE — 90686 IIV4 VACC NO PRSV 0.5 ML IM: CPT | Performed by: FAMILY MEDICINE

## 2021-12-14 ENCOUNTER — TELEPHONE (OUTPATIENT)
Dept: FAMILY MEDICINE CLINIC | Age: 86
End: 2021-12-14

## 2022-01-11 ENCOUNTER — TELEPHONE (OUTPATIENT)
Dept: FAMILY MEDICINE CLINIC | Age: 87
End: 2022-01-11

## 2022-01-19 ENCOUNTER — VIRTUAL VISIT (OUTPATIENT)
Dept: FAMILY MEDICINE CLINIC | Age: 87
End: 2022-01-19
Payer: MEDICARE

## 2022-01-19 DIAGNOSIS — Z00.00 ROUTINE GENERAL MEDICAL EXAMINATION AT A HEALTH CARE FACILITY: Primary | ICD-10-CM

## 2022-01-19 PROCEDURE — 1123F ACP DISCUSS/DSCN MKR DOCD: CPT | Performed by: FAMILY MEDICINE

## 2022-01-19 PROCEDURE — G0439 PPPS, SUBSEQ VISIT: HCPCS | Performed by: FAMILY MEDICINE

## 2022-01-19 PROCEDURE — 4040F PNEUMOC VAC/ADMIN/RCVD: CPT | Performed by: FAMILY MEDICINE

## 2022-01-19 ASSESSMENT — LIFESTYLE VARIABLES: HOW OFTEN DO YOU HAVE A DRINK CONTAINING ALCOHOL: 0

## 2022-01-19 ASSESSMENT — PATIENT HEALTH QUESTIONNAIRE - PHQ9
SUM OF ALL RESPONSES TO PHQ QUESTIONS 1-9: 0
SUM OF ALL RESPONSES TO PHQ QUESTIONS 1-9: 0
SUM OF ALL RESPONSES TO PHQ9 QUESTIONS 1 & 2: 0
SUM OF ALL RESPONSES TO PHQ QUESTIONS 1-9: 0
SUM OF ALL RESPONSES TO PHQ QUESTIONS 1-9: 0
2. FEELING DOWN, DEPRESSED OR HOPELESS: 0
1. LITTLE INTEREST OR PLEASURE IN DOING THINGS: 0

## 2022-01-19 NOTE — PATIENT INSTRUCTIONS
Personalized Preventive Plan for Yuki Rascon - 1/19/2022  Medicare offers a range of preventive health benefits. Some of the tests and screenings are paid in full while other may be subject to a deductible, co-insurance, and/or copay. Some of these benefits include a comprehensive review of your medical history including lifestyle, illnesses that may run in your family, and various assessments and screenings as appropriate. After reviewing your medical record and screening and assessments performed today your provider may have ordered immunizations, labs, imaging, and/or referrals for you. A list of these orders (if applicable) as well as your Preventive Care list are included within your After Visit Summary for your review. Other Preventive Recommendations:    · A preventive eye exam performed by an eye specialist is recommended every 1-2 years to screen for glaucoma; cataracts, macular degeneration, and other eye disorders. · A preventive dental visit is recommended every 6 months. · Try to get at least 150 minutes of exercise per week or 10,000 steps per day on a pedometer . · Order or download the FREE \"Exercise & Physical Activity: Your Everyday Guide\" from [employer]. Call 8-559.249.7897 or search [employer] online. · You need 0751-4183 mg of calcium and 5316-0844 IU of vitamin D per day. It is possible to meet your calcium requirement with diet alone, but a vitamin D supplement is usually necessary to meet this goal.  · When exposed to the sun, use a sunscreen that protects against both UVA and UVB radiation with an SPF of 30 or greater. Reapply every 2 to 3 hours or after sweating, drying off with a towel, or swimming. · Always wear a seat belt when traveling in a car. Always wear a helmet when riding a bicycle or motorcycle.

## 2022-01-19 NOTE — PROGRESS NOTES
recent and remote memory intactThere were no vitals filed for this visit. There is no height or weight on file to calculate BMI. Based upon direct observation of the patient, evaluation of cognition reveals recent and remote memory intact. Patient's complete Health Risk Assessment and screening values have been reviewed and are found in Flowsheets. The following problems were reviewed today and where indicated follow up appointments were made and/or referrals ordered. Positive Risk Factor Screenings with Interventions:          General Health and ACP:  General  In general, how would you say your health is?: Fair  In the past 7 days, have you experienced any of the following?  New or Increased Pain, New or Increased Fatigue, Loneliness, Social Isolation, Stress or Anger?: (!) New or Increased Pain (pain from lumbar stenosis)  Do you get the social and emotional support that you need?: Yes  Do you have a Living Will?: Yes  Advance Directives     Power of  Living Will ACP-Advance Directive ACP-Power of     Not on File Not on File Not on File Not on File      General Health Risk Interventions:  · Pain issues: patient will see ortho for epidural injections soon when COVID eases  · No Living Will: ACP documents already completed- patient asked to provide copy to the office     Hearing/Vision:  No exam data present  Hearing/Vision  Do you or your family notice any trouble with your hearing that hasn't been managed with hearing aids?: (!) Yes  Do you have difficulty driving, watching TV, or doing any of your daily activities because of your eyesight?: No  Have you had an eye exam within the past year?: Yes  Hearing/Vision Interventions:  · Hearing concerns:  patient declines any further evaluation/treatment for hearing issues number given for [facility] but patient declined offer of referral at this time      Personalized Preventive Plan   Current Health Maintenance Status  Immunization History Administered Date(s) Administered    COVID-19, Moderna, Primary or Immunocompromised, PF, 100mcg/0.5mL 01/28/2021, 02/25/2021, 12/22/2021    Influenza A (Y8L8-15) Vaccine PF IM 12/16/2009    Influenza Vaccine, unspecified formulation 10/09/2013, 09/03/2014, 01/03/2017    Influenza Virus Vaccine 10/06/2011, 09/27/2012, 10/09/2013, 09/03/2014, 01/03/2017    Influenza, High Dose (Fluzone 65 yrs and older) 09/03/2014, 11/05/2015, 01/03/2017, 10/17/2017, 09/24/2018    Influenza, Quadv, IM, PF (6 mo and older Fluzone, Flulaval, Fluarix, and 3 yrs and older Afluria) 10/27/2021    Influenza, Quadv, adjuvanted, 65 yrs +, IM, PF (Fluad) 11/03/2020    Influenza, Triv, inactivated, subunit, adjuvanted, IM (Fluad 65 yrs and older) 10/21/2019    Pneumococcal Conjugate 13-valent ([id number]) 09/02/2015    Pneumococcal Polysaccharide ([id number]) 09/18/2013        Health Maintenance   Topic Date Due    DTaP/Tdap/Td vaccine (1 - Tdap) Never done    Shingles Vaccine (1 of 2) Never done    Lipid screen  11/03/2021    Annual Wellness Visit (AWV)  12/10/2021    Depression Screen  07/20/2022    Flu vaccine  Completed    COVID-19 Vaccine  Completed    Hepatitis A vaccine  Aged Out    Hepatitis B vaccine  Aged Out    Hib vaccine  Aged Out    Meningococcal (ACWY) vaccine  Aged Out     Recommendations for Stat Due: see orders and patient instructions/AVS.  . Recommended screening schedule for the next 5-10 years is provided to the patient in written form: see Patient Instructions/AVS.    Alem BERRY LPN, 6/00/4422, performed the documented evaluation under the direct supervision of the attending physician. John Olson, was evaluated through a synchronous (real-time) telephone encounter. The patient (or guardian if applicable) is aware that this is a billable service, which includes applicable co-pays. This Virtual Visit was conducted with patient's (and/or legal guardian's) consent.  The visit was conducted pursuant to the emergency declaration under the [facility], [address] [place] [address] authority and the [employer] and [employer] General Act. Patient identification was verified, and a caregiver was present when appropriate. The patient was located at home in a state where the provider was licensed to provide care. Total time spent for this encounter: Not billed by time    --Merlin Dominguez LPN on 0/45/0722 at 32:40 AM    An electronic signature was used to authenticate this note. This encounter was performed under Estefany granado MDs, direct supervision, 1/19/2022.

## 2022-03-31 ENCOUNTER — APPOINTMENT (OUTPATIENT)
Dept: CT IMAGING | Age: 87
End: 2022-03-31
Payer: MEDICARE

## 2022-03-31 ENCOUNTER — NURSE TRIAGE (OUTPATIENT)
Dept: OTHER | Facility: CLINIC | Age: 87
End: 2022-03-31

## 2022-03-31 ENCOUNTER — HOSPITAL ENCOUNTER (EMERGENCY)
Age: 87
Discharge: HOME | End: 2022-03-31
Payer: MEDICARE

## 2022-03-31 VITALS
SYSTOLIC BLOOD PRESSURE: 156 MMHG | TEMPERATURE: 97.7 F | HEART RATE: 86 BPM | HEIGHT: 65 IN | BODY MASS INDEX: 24.99 KG/M2 | DIASTOLIC BLOOD PRESSURE: 70 MMHG | OXYGEN SATURATION: 96 % | WEIGHT: 150 LBS | RESPIRATION RATE: 18 BRPM

## 2022-03-31 DIAGNOSIS — R19.7 DIARRHEA, UNSPECIFIED TYPE: ICD-10-CM

## 2022-03-31 DIAGNOSIS — R10.30 LOWER ABDOMINAL PAIN: Primary | ICD-10-CM

## 2022-03-31 DIAGNOSIS — K52.9 ENTERITIS: ICD-10-CM

## 2022-03-31 LAB
A/G RATIO: 2 (ref 1.1–2.2)
ALBUMIN SERPL-MCNC: 4.5 G/DL (ref 3.4–5)
ALP BLD-CCNC: 68 U/L (ref 40–129)
ALT SERPL-CCNC: 16 U/L (ref 10–40)
ANION GAP SERPL CALCULATED.3IONS-SCNC: 11 MMOL/L (ref 3–16)
AST SERPL-CCNC: 30 U/L (ref 15–37)
BASOPHILS ABSOLUTE: 0 K/UL (ref 0–0.2)
BASOPHILS RELATIVE PERCENT: 0.5 %
BILIRUB SERPL-MCNC: 0.5 MG/DL (ref 0–1)
BILIRUBIN URINE: NEGATIVE
BLOOD, URINE: NEGATIVE
BUN BLDV-MCNC: 16 MG/DL (ref 7–20)
CALCIUM SERPL-MCNC: 10.5 MG/DL (ref 8.3–10.6)
CHLORIDE BLD-SCNC: 105 MMOL/L (ref 99–110)
CLARITY: CLEAR
CO2: 24 MMOL/L (ref 21–32)
COLOR: YELLOW
CREAT SERPL-MCNC: 0.9 MG/DL (ref 0.6–1.2)
EOSINOPHILS ABSOLUTE: 0.4 K/UL (ref 0–0.6)
EOSINOPHILS RELATIVE PERCENT: 4 %
GFR AFRICAN AMERICAN: >60
GFR NON-AFRICAN AMERICAN: 59
GLUCOSE BLD-MCNC: 95 MG/DL (ref 70–99)
GLUCOSE URINE: NEGATIVE MG/DL
HCT VFR BLD CALC: 48 % (ref 36–48)
HEMOGLOBIN: 15.7 G/DL (ref 12–16)
KETONES, URINE: NEGATIVE MG/DL
LEUKOCYTE ESTERASE, URINE: ABNORMAL
LIPASE: 40 U/L (ref 13–60)
LYMPHOCYTES ABSOLUTE: 1.6 K/UL (ref 1–5.1)
LYMPHOCYTES RELATIVE PERCENT: 14.4 %
MCH RBC QN AUTO: 29.5 PG (ref 26–34)
MCHC RBC AUTO-ENTMCNC: 32.8 G/DL (ref 31–36)
MCV RBC AUTO: 89.9 FL (ref 80–100)
MICROSCOPIC EXAMINATION: YES
MONOCYTES ABSOLUTE: 0.7 K/UL (ref 0–1.3)
MONOCYTES RELATIVE PERCENT: 6.5 %
NEUTROPHILS ABSOLUTE: 8 K/UL (ref 1.7–7.7)
NEUTROPHILS RELATIVE PERCENT: 74.6 %
NITRITE, URINE: NEGATIVE
PDW BLD-RTO: 14.1 % (ref 12.4–15.4)
PH UA: 6 (ref 5–8)
PLATELET # BLD: 172 K/UL (ref 135–450)
PMV BLD AUTO: 10.7 FL (ref 5–10.5)
POTASSIUM REFLEX MAGNESIUM: 4.6 MMOL/L (ref 3.5–5.1)
PROTEIN UA: NEGATIVE MG/DL
RBC # BLD: 5.35 M/UL (ref 4–5.2)
RBC UA: NORMAL /HPF (ref 0–4)
SODIUM BLD-SCNC: 140 MMOL/L (ref 136–145)
SPECIFIC GRAVITY UA: <=1.005 (ref 1–1.03)
TOTAL PROTEIN: 6.7 G/DL (ref 6.4–8.2)
URINE TYPE: ABNORMAL
UROBILINOGEN, URINE: 0.2 E.U./DL
WBC # BLD: 10.8 K/UL (ref 4–11)
WBC UA: NORMAL /HPF (ref 0–5)

## 2022-03-31 PROCEDURE — 85025 COMPLETE CBC W/AUTO DIFF WBC: CPT

## 2022-03-31 PROCEDURE — 81001 URINALYSIS AUTO W/SCOPE: CPT

## 2022-03-31 PROCEDURE — 6360000004 HC RX CONTRAST MEDICATION: Performed by: NURSE PRACTITIONER

## 2022-03-31 PROCEDURE — 80053 COMPREHEN METABOLIC PANEL: CPT

## 2022-03-31 PROCEDURE — 74177 CT ABD & PELVIS W/CONTRAST: CPT

## 2022-03-31 PROCEDURE — 6370000000 HC RX 637 (ALT 250 FOR IP): Performed by: NURSE PRACTITIONER

## 2022-03-31 PROCEDURE — 83690 ASSAY OF LIPASE: CPT

## 2022-03-31 PROCEDURE — 99285 EMERGENCY DEPT VISIT HI MDM: CPT

## 2022-03-31 RX ORDER — METRONIDAZOLE 500 MG/1
500 TABLET ORAL 3 TIMES DAILY
Qty: 42 TABLET | Refills: 0 | Status: SHIPPED | OUTPATIENT
Start: 2022-03-31 | End: 2022-04-14

## 2022-03-31 RX ORDER — CIPROFLOXACIN 500 MG/1
500 TABLET, FILM COATED ORAL 2 TIMES DAILY
Qty: 28 TABLET | Refills: 0 | Status: SHIPPED | OUTPATIENT
Start: 2022-03-31 | End: 2022-04-13 | Stop reason: ALTCHOICE

## 2022-03-31 RX ORDER — CIPROFLOXACIN 500 MG/1
500 TABLET, FILM COATED ORAL ONCE
Status: COMPLETED | OUTPATIENT
Start: 2022-03-31 | End: 2022-03-31

## 2022-03-31 RX ORDER — METRONIDAZOLE 250 MG/1
500 TABLET ORAL ONCE
Status: COMPLETED | OUTPATIENT
Start: 2022-03-31 | End: 2022-03-31

## 2022-03-31 RX ADMIN — METRONIDAZOLE 500 MG: 250 TABLET ORAL at 21:45

## 2022-03-31 RX ADMIN — CIPROFLOXACIN 500 MG: 500 TABLET, FILM COATED ORAL at 21:45

## 2022-03-31 RX ADMIN — IOPAMIDOL 75 ML: 755 INJECTION, SOLUTION INTRAVENOUS at 20:09

## 2022-03-31 ASSESSMENT — PAIN DESCRIPTION - PAIN TYPE: TYPE: ACUTE PAIN

## 2022-03-31 ASSESSMENT — PAIN SCALES - GENERAL: PAINLEVEL_OUTOF10: 4

## 2022-03-31 ASSESSMENT — PAIN DESCRIPTION - DESCRIPTORS: DESCRIPTORS: NAGGING

## 2022-03-31 ASSESSMENT — PAIN DESCRIPTION - FREQUENCY: FREQUENCY: INTERMITTENT

## 2022-03-31 ASSESSMENT — PAIN - FUNCTIONAL ASSESSMENT: PAIN_FUNCTIONAL_ASSESSMENT: 0-10

## 2022-03-31 ASSESSMENT — PAIN DESCRIPTION - LOCATION: LOCATION: ABDOMEN

## 2022-03-31 ASSESSMENT — PAIN DESCRIPTION - PROGRESSION: CLINICAL_PROGRESSION: NOT CHANGED

## 2022-03-31 ASSESSMENT — PAIN DESCRIPTION - ORIENTATION: ORIENTATION: LEFT

## 2022-03-31 ASSESSMENT — PAIN DESCRIPTION - ONSET: ONSET: ON-GOING

## 2022-03-31 NOTE — ED PROVIDER NOTES
hysteroscopy, Kayla Miles EYE SURGERY Right 4/2/2013    cataract w/ IOL    EYE SURGERY Left 5/21/13    cataract with IOL implant    TONSILLECTOMY         CURRENT MEDICATIONS    Current Outpatient Rx   Medication Sig Dispense Refill    ciprofloxacin (CIPRO) 500 MG tablet Take 1 tablet by mouth 2 times daily for 14 days 28 tablet 0    metroNIDAZOLE (FLAGYL) 500 MG tablet Take 1 tablet by mouth 3 times daily for 14 days 42 tablet 0    ipratropium (ATROVENT) 0.03 % nasal spray 2 sprays by Each Nostril route every 12 hours 1 Bottle 3    pantoprazole sodium (PROTONIX) 40 MG PACK packet Take 40 mg by mouth every morning (before breakfast)  (Patient not taking: Reported on 1/19/2022)      isosorbide mononitrate (IMDUR) 30 MG extended release tablet Take 15 mg by mouth       aspirin 81 MG tablet Take 81 mg by mouth daily      amLODIPine (NORVASC) 2.5 MG tablet Take 2.5 mg by mouth daily.  metoprolol (LOPRESSOR) 50 MG tablet Take 50 mg by mouth 2 times daily.  rosuvastatin (CRESTOR) 5 MG tablet Take 1 tablet by mouth daily. 60 tablet 0       ALLERGIES    Allergies   Allergen Reactions    Carbocaine [Mepivacaine Hcl] Other (See Comments)     Dizzy, almost pass out    Prevnar 13 [Pneumococcal 13-Louisa Conj Vacc] Swelling     Localized reaction    Zofran [Ondansetron Hcl] Other (See Comments)     Pt states it caused her stomach to \"burn\"       FAMILY HISTORY    Family History   Problem Relation Age of Onset    Cancer Mother 80        colon    Diabetes Brother     Heart Disease Brother     High Blood Pressure Brother        SOCIAL HISTORY    Social History     Socioeconomic History    Marital status:       Spouse name: None    Number of children: None    Years of education: None    Highest education level: None   Occupational History    None   Tobacco Use    Smoking status: Never Smoker    Smokeless tobacco: Never Used   Vaping Use    Vaping Use: Never used   Substance and Sexual Activity  Alcohol use: No    Drug use: No    Sexual activity: Never   Other Topics Concern    None   Social History Narrative    Grandson lives w/ her in house. She is active and independent     Social Determinants of Health     Financial Resource Strain: Low Risk     Difficulty of Paying Living Expenses: Not hard at all   Food Insecurity: No Food Insecurity    Worried About Running Out of Food in the Last Year: Never true    [address] in the Last Year: Never true   Transportation Needs:     Lack of Transportation (Medical): Not on file    Lack of Transportation (Non-Medical): Not on file   Physical Activity:     Days of Exercise per Week: Not on file    Minutes of Exercise per Session: Not on file   Stress:     Feeling of Stress : Not on file   Social Connections:     Frequency of Communication with Friends and Family: Not on file    Frequency of Social Gatherings with Friends and Family: Not on file    Attends Restorationist Services: Not on file    Active Member of [address] [place] [address] or Organizations: Not on file    Attends Club or Organization Meetings: Not on file    Marital Status: Not on file   Intimate Partner Violence:     Fear of Current or Ex-Partner: Not on file    Emotionally Abused: Not on file    Physically Abused: Not on file    Sexually Abused: Not on file   Housing Stability:     Unable to Pay for Housing in the Last Year: Not on file    Number of [place] in the Last Year: Not on file    Unstable Housing in the Last Year: Not on file     REVIEW OFSYSTEMS    10systems reviewed, pertinent positives per HPI otherwise noted to be negative. PHYSICAL EXAM  Physical Exam  Vitals:    03/31/22 2146   BP: (!) 156/70   Pulse: 86   Resp: 18   Temp: 97.7 °F (36.5 °C)   SpO2: 96%     GENERAL: Patient is elderly, well-nourished. Awake andalert. Cooperative. Resting in bed. No apparent distress. HEENT:  Normocephalic, atraumatic. Conjunctivaappear normal. Sclera is non-icteric.   External ears are normal.    NECK: Supple with normal ROM. Tracheamidline  LUNGS: Equal and symmetric chest rise. Breathing is unlabored. Speaking comfortably in fullsentences. Lungs are clear bilaterally to auscultation. Without wheezing, rales, or rhonchi. CADIOVASCULAR:  Regular rate and rhythm. Normal S1-S2 sounds. No murmurs, rubs, or gallops. GI: Soft, lower abdominal tenderness to palpation, nondistended with positive bowel sounds. No rebound tenderness, guarding or rigidity. Negative Ivey's sign. Negative Rovsing's sign. No CVAT to palpation. No masses or hepatosplenomegaly to palpation. MUSCULOSKELETAL:  No gross deformities or trauma noted. Moving all extremities equally and appropriately. Normal ROM. SKIN: Warm/dry. Skin is intact. No rashes or lesions noted. PSYCHIATRIC: Mood and affect appropriate. Speech is clear and articulate. NEUROLOGIC: Alert and oriented. No focal motor or sensory deficits.      LABS   Results for orders placed or performed during the hospital encounter of 03/31/22   CBC with Auto Differential   Result Value Ref Range    WBC 10.8 4.0 - 11.0 K/uL    RBC 5.35 (H) 4.00 - 5.20 M/uL    Hemoglobin 15.7 12.0 - 16.0 g/dL    Hematocrit 48.0 36.0 - 48.0 %    MCV 89.9 80.0 - 100.0 fL    MCH 29.5 26.0 - 34.0 pg    MCHC 32.8 31.0 - 36.0 g/dL    RDW 14.1 12.4 - 15.4 %    Platelets 790 881 - 899 K/uL    MPV 10.7 (H) 5.0 - 10.5 fL    Neutrophils % 74.6 %    Lymphocytes % 14.4 %    Monocytes % 6.5 %    Eosinophils % 4.0 %    Basophils % 0.5 %    Neutrophils Absolute 8.0 (H) 1.7 - 7.7 K/uL    Lymphocytes Absolute 1.6 1.0 - 5.1 K/uL    Monocytes Absolute 0.7 0.0 - 1.3 K/uL    Eosinophils Absolute 0.4 0.0 - 0.6 K/uL    Basophils Absolute 0.0 0.0 - 0.2 K/uL   Comprehensive Metabolic Panel w/ Reflex to MG   Result Value Ref Range    Sodium 140 136 - 145 mmol/L    Potassium reflex Magnesium 4.6 3.5 - 5.1 mmol/L    Chloride 105 99 - 110 mmol/L    CO2 24 21 - 32 mmol/L    Anion Gap 11 3 - 16    Glucose 95 70 - 99 mg/dL    BUN 16 7 - 20 mg/dL    CREATININE 0.9 0.6 - 1.2 mg/dL    GFR Non- 59 (A) >60    GFR African American >60 >60    Calcium 10.5 8.3 - 10.6 mg/dL    Total Protein 6.7 6.4 - 8.2 g/dL    Albumin 4.5 3.4 - 5.0 g/dL    Albumin/Globulin Ratio 2.0 1.1 - 2.2    Total Bilirubin 0.5 0.0 - 1.0 mg/dL    Alkaline Phosphatase 68 40 - 129 U/L    ALT 16 10 - 40 U/L    AST 30 15 - 37 U/L   Lipase   Result Value Ref Range    Lipase 40.0 13.0 - 60.0 U/L   Urinalysis   Result Value Ref Range    Color, UA Yellow Straw/Yellow    Clarity, UA Clear Clear    Glucose, Ur Negative Negative mg/dL    Bilirubin Urine Negative Negative    Ketones, Urine Negative Negative mg/dL    Specific Gravity, UA <=1.005 1.005 - 1.030    Blood, Urine Negative Negative    pH, UA 6.0 5.0 - 8.0    Protein, UA Negative Negative mg/dL    Urobilinogen, Urine 0.2 <2.0 E.U./dL    Nitrite, Urine Negative Negative    Leukocyte Esterase, Urine TRACE (A) Negative    Microscopic Examination YES     Urine Type NotGiven    Microscopic Urinalysis   Result Value Ref Range    WBC, UA 3-5 0 - 5 /HPF    RBC, UA None seen 0 - 4 /HPF     RADIOLOGY    CT ABDOMEN PELVIS W IV CONTRAST Additional Contrast? None    Result Date: 3/31/2022  EXAMINATION: CT OF THE ABDOMEN AND PELVIS WITH CONTRAST 3/31/2022 7:54 pm TECHNIQUE: CT of the abdomen and pelvis was performed with the administration of intravenous contrast. Multiplanar reformatted images are provided for review. Dose modulation, iterative reconstruction, and/or weight based adjustment of the mA/kV was utilized to reduce the radiation dose to as low as reasonably achievable.  COMPARISON: 10/28/2018 HISTORY: ORDERING SYSTEM PROVIDED HISTORY: abdominal pain TECHNOLOGIST PROVIDED HISTORY: Additional Contrast?->None Reason for exam:->abdominal pain Decision Support Exception - unselect if not a suspected or confirmed emergency medical condition->Emergency Medical Condition (MA) Reason for Exam: abdominal pain FINDINGS: Lower Chest:  Visualized portion of the lower chest demonstrates no acute abnormality. Subsegmental atelectasis in the bilateral lower lobes. Calcified granulomas at the right neva. Organs:  Liver is normal in size and CT attenuation. Gallbladder, spleen, adrenals, and pancreas are unremarkable. No biliary ductal dilation. Kidneys enhance symmetrically. No hydroureteronephrosis. Extrarenal pelvis noted on the right, unchanged in comparison to prior. Simple left renal cyst. GI/Bowel: No bowel obstruction. Normal appendix in the right lower quadrant. Multifocal small bowel wall thickening with adjacent inflammatory stranding and mucosal hyperemia, for example on coronal image 79 and axial image 100. small volume of free fluid in the pelvis which is favored reactive. No organized fluid collection. No free intraperitoneal air. Colonic diverticulosis without CT evidence of acute diverticulitis. Pelvis: Bladder is decompressed and unremarkable. Normal appearance of the uterus for patient age. No adnexal mass. Peritoneum/Retroperitoneum: Abdominal aorta is normal in course and caliber. The portal vein is patent. No lymphadenopathy. No free intraperitoneal air. Bones/Soft Tissues: No acute soft tissue abnormality. No acute osseous abnormality or suspicious osseous lesion. Multiple colles small-bowel wall thickening with adjacent inflammatory stranding and mucosal hyperemia, consistent with mild enteritis. No associated bowel obstruction. Small volume of free fluid in the pelvis. No free intraperitoneal air. ED COURSE/MDM  Patient seen and evaluated. Old records reviewed. Diagnostic testing reviewed and results discussed. I have evaluated this patient. My supervising physician was available for consultation. Ravi More presented to the ED today with above noted complaints. Arrival vital signs: Afebrile, hemodynamically stable except for blood pressure elevated at 181/84.   Well saturated on room air. Physical exam performed at 1730: Lower abdominal tenderness to palpation. No leg weakness, range of motion to the bilateral lower extremities intact. 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 Blood work: Without evidence of systemic infection. No anemia. No electrolyte abnormality. No evidence of acute kidney injury or transaminitis. Lipase is normal.  UA: Without evidence of infection. No blood. Imaging: CT abdomen and pelvis shows multiple colles small bowel wall thickening with adjacent inflammatory stranding and mucosal hyperemia consistent with mild enteritis. No associated bowel obstruction. Small volume of free fluid in the pelvis. No free intraperitoneal air. Patient was reporting incontinence of bowel, she does have a history of lumbar stenosis. I am not concerned for spinal cord compression at this time as patient does not have other complaints of spinal cord compression, she is having no urinary incontinence, no leg weakness and is able to walk without difficulty. Patient was reporting feeling like she was constipated, drink prune juice and then was having diarrhea related to this. I feel that this is most likely what was causing the bowel incontinence, due to loose stools. Patient does seem to be rather focused on being constipated, asked me multiple times what can she do for constipation. I attempted to explain to her that with her having loose bowel movements to the extent that she was having incontinence I did not want to treat her for constipation. I recommended that she start a fiber supplement to see if this would help regulate her bowel movements and strongly encouraged her to follow-up with her primary care provider.     Medications given in the ED:   Medications   iopamidol (ISOVUE-370) 76 % injection 75 mL (75 mLs IntraVENous Given 3/31/22 2009)   ciprofloxacin (CIPRO) tablet 500 mg (500 mg Oral Given 3/31/22 2145)   metroNIDAZOLE (FLAGYL) tablet 500 mg (500 mg Oral Given 3/31/22 5652)      At this point I do not feel the patient requires further work upand it is reasonable to discharge the patient. Please refer to AVS for further details regarding discharge instructions. A discussion was had with the patient regarding diagnosis, diagnostic testing results,treatment/ plan of care, and follow up. All questions were answered. Patient will follow up as directed for further evaluation/treatment. The patient was given strict return precautions as we discussed symptoms that wouldnecessitate return to the ED. Patient will return to ED for new/worsening symptoms. The patient verbalized their understanding and agreement with the above plan. Patient was sent home with a prescription for below medication/s. I did [place] patient on appropriate use of these medication. Discharge Medication List as of 3/31/2022  9:42 PM      START taking these medications    Details   ciprofloxacin (CIPRO) 500 MG tablet Take 1 tablet by mouth 2 times daily for 14 days, Disp-28 tablet, R-0Normal      metroNIDAZOLE (FLAGYL) 500 MG tablet Take 1 tablet by mouth 3 times daily for 14 days, Disp-42 tablet, R-0Normal             I estimatethere is LOW risk for ACUTE APPENDICITIS, BOWEL OBSTRUCTION, CHOLECYSTITIS, DIVERTICULITIS, INCARCERATED HERNIA, PANCREATITIS, or PERFORATED BOWEL or ULCER, thus I consider the discharge disposition reasonable. Also, thereis no evidence or peritonitis, sepsis, or toxicity. Leah Skinner and I have discussed the diagnosis and risks, and we agree with discharging home to follow-up with their primary doctor. We also discussed returning to the EmergencyDepartment immediately if new or worsening symptoms occur. We have discussed the symptoms which are most concerning (e.g., bloody stool, fever, changing or worsening pain, vomiting) that necessitate immediate return. CLINICAL IMPRESSION    1. Lower abdominal pain    2.  Diarrhea, unspecified type    3. Enteritis           Discharge Vitals:  Blood pressure (!) 156/70, pulse 86, temperature 97.7 °F (36.5 °C), temperature source Oral, resp. rate 18, height 5' 5\" (1.651 m), weight 150 lb (68 kg), SpO2 96 %, not currently breastfeeding. FOLLOW UP  Naomy Bowen MD  [address]  [phone]    Call in 1 day  For further evaluation    [facility]  ED  [facility] [address] [phone]  Go to   For further evaluation      DISPOSITION  Patient was discharged to home in good condition. Comment: Pleasenote this report has been produced using speech recognition software and may contain errors related to that system including errors in grammar, punctuation, and spelling, as well as words and phrases that may beinappropriate. If there are any questions or concerns please feel free to contact the dictating provider for clarification.         Exie Areas, APRN - CNP  04/01/22 0087

## 2022-03-31 NOTE — TELEPHONE ENCOUNTER
Received call from [address] at [facility] with Red Flag Complaint. Subjective: Caller states \"having numbness feeling in my bowels\"     Current Symptoms: chronic back pain with feelings of numbness in rectal/bowels, intermittent constipation and abdominal pain     Onset: 1 month ago; worsening    Associated Symptoms: constipation    Pain Severity: 5/10; solid/burning; intermittent    Temperature: No     What has been tried: NA    LMP: NA Pregnant: NA    Recommended disposition: Go to ED Now    Care advice provided, patient verbalizes understanding; denies any other questions or concerns; instructed to call back for any new or worsening symptoms. Patient/caller agrees to proceed to the Emergency Department     Attention Provider: Thank you for allowing me to participate in the care of your patient. The patient was connected to triage in response to information provided to the ECC/PSC. Please do not respond through this encounter as the response is not directed to a shared pool.           Reason for Disposition   Numbness (loss of sensation) in groin or rectal area    Protocols used: BACK PAIN-ADULT-[place]

## 2022-04-06 ENCOUNTER — HOSPITAL ENCOUNTER (EMERGENCY)
Age: 87
Discharge: HOME | End: 2022-04-06
Attending: EMERGENCY MEDICINE
Payer: MEDICARE

## 2022-04-06 ENCOUNTER — APPOINTMENT (OUTPATIENT)
Dept: CT IMAGING | Age: 87
End: 2022-04-06
Payer: MEDICARE

## 2022-04-06 ENCOUNTER — APPOINTMENT (OUTPATIENT)
Dept: ULTRASOUND IMAGING | Age: 87
End: 2022-04-06
Payer: MEDICARE

## 2022-04-06 ENCOUNTER — OFFICE VISIT (OUTPATIENT)
Dept: FAMILY MEDICINE CLINIC | Age: 87
End: 2022-04-06
Payer: MEDICARE

## 2022-04-06 VITALS
TEMPERATURE: 98.5 F | BODY MASS INDEX: 25.33 KG/M2 | RESPIRATION RATE: 18 BRPM | HEIGHT: 65 IN | SYSTOLIC BLOOD PRESSURE: 162 MMHG | DIASTOLIC BLOOD PRESSURE: 75 MMHG | OXYGEN SATURATION: 96 % | WEIGHT: 152 LBS | HEART RATE: 85 BPM

## 2022-04-06 VITALS
WEIGHT: 152 LBS | HEART RATE: 57 BPM | OXYGEN SATURATION: 96 % | SYSTOLIC BLOOD PRESSURE: 138 MMHG | HEIGHT: 65 IN | DIASTOLIC BLOOD PRESSURE: 86 MMHG | RESPIRATION RATE: 16 BRPM | BODY MASS INDEX: 25.33 KG/M2 | TEMPERATURE: 96.9 F

## 2022-04-06 DIAGNOSIS — K83.8 BILIARY SLUDGE: ICD-10-CM

## 2022-04-06 DIAGNOSIS — K57.90 DIVERTICULOSIS: ICD-10-CM

## 2022-04-06 DIAGNOSIS — R10.84 GENERALIZED ABDOMINAL PAIN: ICD-10-CM

## 2022-04-06 DIAGNOSIS — N28.1 KIDNEY CYSTS: ICD-10-CM

## 2022-04-06 DIAGNOSIS — R10.9 ACUTE ABDOMINAL PAIN: Primary | ICD-10-CM

## 2022-04-06 DIAGNOSIS — R11.0 NAUSEA: ICD-10-CM

## 2022-04-06 DIAGNOSIS — R10.33 PERIUMBILICAL ABDOMINAL PAIN: Primary | ICD-10-CM

## 2022-04-06 LAB
A/G RATIO: 1.9 (ref 1.1–2.2)
ALBUMIN SERPL-MCNC: 4.6 G/DL (ref 3.4–5)
ALP BLD-CCNC: 64 U/L (ref 40–129)
ALT SERPL-CCNC: 28 U/L (ref 10–40)
ANION GAP SERPL CALCULATED.3IONS-SCNC: 12 MMOL/L (ref 3–16)
AST SERPL-CCNC: 43 U/L (ref 15–37)
BASOPHILS ABSOLUTE: 0.1 K/UL (ref 0–0.2)
BASOPHILS RELATIVE PERCENT: 0.6 %
BILIRUB SERPL-MCNC: 0.4 MG/DL (ref 0–1)
BILIRUBIN URINE: NEGATIVE
BLOOD, URINE: NEGATIVE
BUN BLDV-MCNC: 17 MG/DL (ref 7–20)
CALCIUM SERPL-MCNC: 10.8 MG/DL (ref 8.3–10.6)
CHLORIDE BLD-SCNC: 107 MMOL/L (ref 99–110)
CLARITY: CLEAR
CO2: 22 MMOL/L (ref 21–32)
COLOR: NORMAL
CREAT SERPL-MCNC: 0.8 MG/DL (ref 0.6–1.2)
EOSINOPHILS ABSOLUTE: 0.5 K/UL (ref 0–0.6)
EOSINOPHILS RELATIVE PERCENT: 5 %
GFR AFRICAN AMERICAN: >60
GFR NON-AFRICAN AMERICAN: >60
GLUCOSE BLD-MCNC: 95 MG/DL (ref 70–99)
GLUCOSE URINE: NEGATIVE MG/DL
HCT VFR BLD CALC: 47.7 % (ref 36–48)
HEMOGLOBIN: 15.8 G/DL (ref 12–16)
KETONES, URINE: NEGATIVE MG/DL
LEUKOCYTE ESTERASE, URINE: NEGATIVE
LIPASE: 49 U/L (ref 13–60)
LYMPHOCYTES ABSOLUTE: 1.4 K/UL (ref 1–5.1)
LYMPHOCYTES RELATIVE PERCENT: 15.1 %
MCH RBC QN AUTO: 29.7 PG (ref 26–34)
MCHC RBC AUTO-ENTMCNC: 33.2 G/DL (ref 31–36)
MCV RBC AUTO: 89.5 FL (ref 80–100)
MICROSCOPIC EXAMINATION: NORMAL
MONOCYTES ABSOLUTE: 0.7 K/UL (ref 0–1.3)
MONOCYTES RELATIVE PERCENT: 8 %
NEUTROPHILS ABSOLUTE: 6.5 K/UL (ref 1.7–7.7)
NEUTROPHILS RELATIVE PERCENT: 71.3 %
NITRITE, URINE: NEGATIVE
PDW BLD-RTO: 14 % (ref 12.4–15.4)
PH UA: 5.5 (ref 5–8)
PLATELET # BLD: 181 K/UL (ref 135–450)
PMV BLD AUTO: 9.8 FL (ref 5–10.5)
POTASSIUM REFLEX MAGNESIUM: 4.5 MMOL/L (ref 3.5–5.1)
PROTEIN UA: NEGATIVE MG/DL
RBC # BLD: 5.33 M/UL (ref 4–5.2)
SODIUM BLD-SCNC: 141 MMOL/L (ref 136–145)
SPECIFIC GRAVITY UA: <=1.005 (ref 1–1.03)
TOTAL PROTEIN: 7 G/DL (ref 6.4–8.2)
URINE REFLEX TO CULTURE: NORMAL
URINE TYPE: NORMAL
UROBILINOGEN, URINE: 0.2 E.U./DL
WBC # BLD: 9.1 K/UL (ref 4–11)

## 2022-04-06 PROCEDURE — G8427 DOCREV CUR MEDS BY ELIG CLIN: HCPCS | Performed by: PHYSICIAN ASSISTANT

## 2022-04-06 PROCEDURE — 1090F PRES/ABSN URINE INCON ASSESS: CPT | Performed by: PHYSICIAN ASSISTANT

## 2022-04-06 PROCEDURE — 76705 ECHO EXAM OF ABDOMEN: CPT

## 2022-04-06 PROCEDURE — 81003 URINALYSIS AUTO W/O SCOPE: CPT

## 2022-04-06 PROCEDURE — 4040F PNEUMOC VAC/ADMIN/RCVD: CPT | Performed by: PHYSICIAN ASSISTANT

## 2022-04-06 PROCEDURE — 6360000004 HC RX CONTRAST MEDICATION: Performed by: EMERGENCY MEDICINE

## 2022-04-06 PROCEDURE — 1036F TOBACCO NON-USER: CPT | Performed by: PHYSICIAN ASSISTANT

## 2022-04-06 PROCEDURE — 99215 OFFICE O/P EST HI 40 MIN: CPT | Performed by: PHYSICIAN ASSISTANT

## 2022-04-06 PROCEDURE — 80053 COMPREHEN METABOLIC PANEL: CPT

## 2022-04-06 PROCEDURE — G8417 CALC BMI ABV UP PARAM F/U: HCPCS | Performed by: PHYSICIAN ASSISTANT

## 2022-04-06 PROCEDURE — 83690 ASSAY OF LIPASE: CPT

## 2022-04-06 PROCEDURE — 85025 COMPLETE CBC W/AUTO DIFF WBC: CPT

## 2022-04-06 PROCEDURE — 1123F ACP DISCUSS/DSCN MKR DOCD: CPT | Performed by: PHYSICIAN ASSISTANT

## 2022-04-06 PROCEDURE — 74177 CT ABD & PELVIS W/CONTRAST: CPT

## 2022-04-06 PROCEDURE — 6370000000 HC RX 637 (ALT 250 FOR IP): Performed by: EMERGENCY MEDICINE

## 2022-04-06 PROCEDURE — 99284 EMERGENCY DEPT VISIT MOD MDM: CPT

## 2022-04-06 RX ORDER — DICYCLOMINE HCL 20 MG
20 TABLET ORAL ONCE
Status: COMPLETED | OUTPATIENT
Start: 2022-04-06 | End: 2022-04-06

## 2022-04-06 RX ORDER — DICYCLOMINE HYDROCHLORIDE 10 MG/1
10 CAPSULE ORAL 4 TIMES DAILY
Qty: 40 CAPSULE | Refills: 0 | Status: SHIPPED | OUTPATIENT
Start: 2022-04-06 | End: 2022-09-28 | Stop reason: SDUPTHER

## 2022-04-06 RX ORDER — FAMOTIDINE 20 MG/1
20 TABLET, FILM COATED ORAL DAILY
Qty: 20 TABLET | Refills: 0 | Status: SHIPPED | OUTPATIENT
Start: 2022-04-06 | End: 2022-09-28 | Stop reason: SDUPTHER

## 2022-04-06 RX ORDER — MAGNESIUM HYDROXIDE/ALUMINUM HYDROXICE/SIMETHICONE 120; 1200; 1200 MG/30ML; MG/30ML; MG/30ML
30 SUSPENSION ORAL ONCE
Status: COMPLETED | OUTPATIENT
Start: 2022-04-06 | End: 2022-04-06

## 2022-04-06 RX ADMIN — DICYCLOMINE HYDROCHLORIDE 20 MG: 20 TABLET ORAL at 21:31

## 2022-04-06 RX ADMIN — MAGNESIUM HYDROXIDE/ALUMINUM HYDROXICE/SIMETHICONE 30 ML: 120; 1200; 1200 SUSPENSION ORAL at 20:28

## 2022-04-06 RX ADMIN — IOPAMIDOL 75 ML: 755 INJECTION, SOLUTION INTRAVENOUS at 21:10

## 2022-04-06 ASSESSMENT — PAIN SCALES - GENERAL
PAINLEVEL_OUTOF10: 4
PAINLEVEL_OUTOF10: 4

## 2022-04-06 ASSESSMENT — PAIN - FUNCTIONAL ASSESSMENT
PAIN_FUNCTIONAL_ASSESSMENT: 0-10
PAIN_FUNCTIONAL_ASSESSMENT: 0-10

## 2022-04-06 ASSESSMENT — PAIN DESCRIPTION - PAIN TYPE: TYPE: ACUTE PAIN

## 2022-04-06 ASSESSMENT — PAIN DESCRIPTION - ORIENTATION: ORIENTATION: LOWER

## 2022-04-06 ASSESSMENT — PAIN DESCRIPTION - DESCRIPTORS: DESCRIPTORS: BURNING

## 2022-04-06 ASSESSMENT — PAIN DESCRIPTION - LOCATION: LOCATION: ABDOMEN

## 2022-04-06 NOTE — PROGRESS NOTES
[address] MEDICINE    04/06/22     CHIEF COMPLAINT  Chief Complaint   Patient presents with    Diarrhea     Pt was in [facility] ER on 03/31/2022 for lower abdominal pain, they gave her 2 antibiotics which she states she cannot take due to side effects        HISTORY OF PRESENT  ILLNESS  Leah Skinner is a 80 y.o.  female   Seen in ER on 3/31/22 for lower abd burning sensation. CT done. Discharged on cipro and flagyl. Cipro makes her sick to stomach after 2 doses. Flagyl did not help and made her feel worse. Burning sensation continues all the way into the pelvic region and is more painful than before. Feels worse on an empty stomach. Eating helps it. No longer having diarrhea. No fever. No vomiting. No constipation. Denies melena or hematochezia. No urinary changes. ROS:    Remaining 14 ROS were reviewed and are unremarkable for other constitutional, EENT, cardiac, pulmonary, GI, , neurologic, musculoskeletal, or integumentary complaints. PAST MEDICAL/SURGICAL, SOCIAL, &  FAMILY HISTORY:  Reviewed and updated accordingly. ALLERGIES : Carbocaine [mepivacaine hcl], Prevnar 13 [pneumococcal 13-christina conj vacc], and Zofran [ondansetron hcl]    MEDICATIONS:  Current Outpatient Medications   Medication Sig Dispense Refill    ciprofloxacin (CIPRO) 500 MG tablet Take 1 tablet by mouth 2 times daily for 14 days 28 tablet 0    ipratropium (ATROVENT) 0.03 % nasal spray 2 sprays by Each Nostril route every 12 hours 1 Bottle 3    isosorbide mononitrate (IMDUR) 30 MG extended release tablet Take 15 mg by mouth       aspirin 81 MG tablet Take 81 mg by mouth daily      amLODIPine (NORVASC) 2.5 MG tablet Take 2.5 mg by mouth daily.  rosuvastatin (CRESTOR) 5 MG tablet Take 1 tablet by mouth daily.  60 tablet 0    metroNIDAZOLE (FLAGYL) 500 MG tablet Take 1 tablet by mouth 3 times daily for 14 days (Patient not taking: Reported on 4/6/2022) 42 tablet 0    metoprolol (LOPRESSOR) 50 MG tablet Take 50 mg by mouth 2 times daily. No current facility-administered medications for this visit. PHYSICAL EXAM     Vitals:    04/06/22 1536   BP: 138/86   Site: Left Upper Arm   Position: Sitting   Pulse: 57   Resp: 16   Temp: 96.9 °F (36.1 °C)   TempSrc: Temporal   SpO2: 96%   Weight: 152 lb (68.9 kg)   Height: 5' 5\" (1.651 m)   Body mass index is 25.29 kg/m². APPEARANCE: Well nourished. No distress. HEENT: Head: Normocephalic, atraumatic. EOMI,PERRLA. Conjunctiva pink and moist. Sclera white. Hearing intact. Nares patent. Oral mucosa moist. Throat clear. NECK: No lymphadenopathy or masses. Thyroid is smooth. Moves neck fully. HEART: Reg rate and rhythm. No murmurs, rubs, or gallops. LUNGS: Clear to auscultation. No wheezes, rales, or rhonchi. ABDOMEN:  Soft, hyperactive bowel sounds , Tenderness throughout with guarding on palpation. No masses or organomegaly. MUSCULOSKELETAL:  No clubbing, cyanosis or edema. Moves all joints without eliciting pain. NEUROLOGIC: Grossly non focal.   SKIN: Warm, dry, normal turgor. Cap refill <3secs. No rashes, petechiae, purpura. ADDITIONAL STUDIES     Pertinent prior laboratory results and/or imaging reviewed. CT scan reviewed:     IMPRESSION/ PLAN  1. Acute abdominal pain    2. Generalized abdominal pain    3. Nausea      -Cipro and Flagyl was intolerable to pt as they made her sick to her stomach.  -Cannot rule out ischemia since inflammatory changes seen with fluid in pelvis and pain is  worse.  No bowel changes.  -Discussed case with Dr Daniel Garcia.  -Maia Cordova to Emergency Dept, wheel-chaired via our nurse.  - gaver report to CHRISTI Sigala       Electronically signed by REBEKA Barbour on 4/6/2022 at 4:11 PM

## 2022-04-07 NOTE — ED PROVIDER NOTES
CHIEF COMPLAINT  Abdominal Pain (since 3/31. was seen here for similar symptoms then. pt reports nausea in the morning. states when she was here a week ago they didnt find anything. states she is only here for a prescription for medication for her abd pain)      HISTORY OF PRESENT ILLNESS  Michelle Martinez is a 80 y.o. female with a history of hyperlipidemia, hypertension and CAD who presents to the ED complaining of abdominal pain. Patient reports that she has had some intermittent abdominal pain since she was in her teens. Patient states that pain began on 3/31 and she describes as a \"burning \"in the mid to lower abdomen. Pain was rated as 4/10. Patient was seen and evaluated in the emergency department approximately 1 week ago and started on Cipro and Flagyl for enteritis. Patient reports that she has not really been taking the medications but that loose stool is now only occurring once a day. No nausea, vomiting, or diarrhea. .   No other complaints, modifying factors or associated symptoms. I have reviewed the following from the nursing documentation. Past Medical History:   Diagnosis Date    Arthritis     ASHD (arteriosclerotic heart disease)     s/p 5 stents placed     Breast cancer ([facility] [address] 2004    left s/p lumpectomy and radiation    Hyperlipidemia     Hypertension      Past Surgical History:   Procedure Laterality Date    BREAST LUMPECTOMY  2004    left     SECTION      CORONARY ANGIOPLASTY WITH STENT PLACEMENT  2009    5 drug-eluting    DILATION AND CURETTAGE OF UTERUS  2017    hysteroscopy, myosure    EYE SURGERY Right 2013    cataract w/ IOL    EYE SURGERY Left 13    cataract with IOL implant    TONSILLECTOMY       Family History   Problem Relation Age of Onset    Cancer Mother 80        colon    Diabetes Brother     Heart Disease Brother     High Blood Pressure Brother      Social History     Socioeconomic History    Marital status:   Spouse name: Not on file    Number of children: Not on file    Years of education: Not on file    Highest education level: Not on file   Occupational History    Not on file   Tobacco Use    Smoking status: Never Smoker    Smokeless tobacco: Never Used   Vaping Use    Vaping Use: Never used   Substance and Sexual Activity    Alcohol use: No    Drug use: No    Sexual activity: Never   Other Topics Concern    Not on file   Social History Narrative    Grandson lives w/ her in house. She is active and independent     Social Determinants of Health     Financial Resource Strain: Low Risk     Difficulty of Paying Living Expenses: Not hard at all   Food Insecurity: No Food Insecurity    Worried About Running Out of Food in the Last Year: Never true    [address] in the Last Year: Never true   Transportation Needs:     Lack of Transportation (Medical): Not on file    Lack of Transportation (Non-Medical): Not on file   Physical Activity:     Days of Exercise per Week: Not on file    Minutes of Exercise per Session: Not on file   Stress:     Feeling of Stress : Not on file   Social Connections:     Frequency of Communication with Friends and Family: Not on file    Frequency of Social Gatherings with Friends and Family: Not on file    Attends Christianity Services: Not on file    Active Member of [address] [place] [address] [employer] or Organizations: Not on file    Attends Club or Organization Meetings: Not on file    Marital Status: Not on file   Intimate Partner Violence:     Fear of Current or Ex-Partner: Not on file    Emotionally Abused: Not on file    Physically Abused: Not on file    Sexually Abused: Not on file   Housing Stability:     Unable to Pay for Housing in the Last Year: Not on file    Number of [place] in the Last Year: Not on file    Unstable Housing in the Last Year: Not on file     No current facility-administered medications for this encounter.      Current Outpatient Medications   Medication Sig Dispense Refill    ciprofloxacin (CIPRO) 500 MG tablet Take 1 tablet by mouth 2 times daily for 14 days 28 tablet 0    metroNIDAZOLE (FLAGYL) 500 MG tablet Take 1 tablet by mouth 3 times daily for 14 days (Patient not taking: Reported on 4/6/2022) 42 tablet 0    ipratropium (ATROVENT) 0.03 % nasal spray 2 sprays by Each Nostril route every 12 hours 1 Bottle 3    isosorbide mononitrate (IMDUR) 30 MG extended release tablet Take 15 mg by mouth       aspirin 81 MG tablet Take 81 mg by mouth daily      amLODIPine (NORVASC) 2.5 MG tablet Take 2.5 mg by mouth daily.  metoprolol (LOPRESSOR) 50 MG tablet Take 50 mg by mouth 2 times daily.  rosuvastatin (CRESTOR) 5 MG tablet Take 1 tablet by mouth daily. 60 tablet 0     Allergies   Allergen Reactions    Carbocaine [Mepivacaine Hcl] Other (See Comments)     Dizzy, almost pass out    Prevnar 13 [Pneumococcal 13-Luoisa Conj Vacc] Swelling     Localized reaction    Zofran [Ondansetron Hcl] Other (See Comments)     Pt states it caused her stomach to \"burn\"       REVIEW OF SYSTEMS  10 systems reviewed, pertinent positives per HPI otherwise noted to be negative. PHYSICAL EXAM  BP (!) 150/94   Pulse 85   Temp 98.5 °F (36.9 °C) (Oral)   Resp 18   Ht 5' 5\" (1.651 m)   Wt 152 lb (68.9 kg)   SpO2 97%   BMI 25.29 kg/m²   GENERAL APPEARANCE: Awake and alert. Cooperative. No acute distress. HEAD: Normocephalic. Atraumatic. EYES: PERRL. EOM's grossly intact. ENT: Mucous membranes are moist.   NECK: Supple, trachea midline. HEART: RRR. Normal S1, S2. No murmurs, rubs or gallops. LUNGS: Respirations unlabored. CTAB. Good air exchange. No wheezes, rales, or rhonchi. Speaking comfortably in full sentences. ABDOMEN: Soft. Non-distended. Mild diffuse mid abdominal tenderness to palpation. . No guarding or rebound. Normal Bowel sounds. EXTREMITIES: No peripheral edema. MAEE. No acute deformities. SKIN: Warm and dry. No acute rashes.    NEUROLOGICAL: Alert Urine Negative Negative mg/dL    Specific Gravity, UA <=1.005 1.005 - 1.030    Blood, Urine Negative Negative    pH, UA 5.5 5.0 - 8.0    Protein, UA Negative Negative mg/dL    Urobilinogen, Urine 0.2 <2.0 E.U./dL    Nitrite, Urine Negative Negative    Leukocyte Esterase, Urine Negative Negative    Microscopic Examination Not Indicated     Urine Type NotGiven     Urine Reflex to Culture Not Indicated          RADIOLOGY  X-RAYS:  I have reviewed radiologic plain film image(s). ALL OTHER NON-PLAIN FILM IMAGES SUCH AS CT, ULTRASOUND AND MRI HAVE BEEN READ BY THE RADIOLOGIST. US GALLBLADDER RUQ   Final Result   Sludge in the gallbladder. Otherwise, unremarkable right upper quadrant   ultrasound. CT ABDOMEN PELVIS W IV CONTRAST Additional Contrast? None   Final Result   There is diverticular disease of the sigmoid colon without diverticulitis. There is no evidence of small-bowel obstruction or enteritis. Evidence of prior granulomatous disease. Diverticular disease of the sigmoid colon without diverticulitis. Bilateral renal cysts. Degenerative disc disease of the L3-L4 and L4-L5 discs. Rechecks: Physical assessment performed. 1910: Pain 4/10.    2100: Patient's pain is 0/10. Resting comfortably. ED COURSE/MDM  Patient seen and evaluated. Old records reviewed. Labs and imaging reviewed and results discussed with patient. Patient was given Maalox and Bentyl in the ED with good symptomatic relief. Patient was reassessed as noted above . No acute pathology was noted and pt is safe for discharge home to follow up with PCP and general surgery. Patient's labs are stable. Ultrasound is concerning for biliary sludge without evidence of cholecystitis. Chronic changes noted on CAT scan. These were discussed with patient as well as follow-up recommendations. . Plan of care discussed with patient and family. Patient and family in agreement with plan.      Patient was given scripts for the following medications. I counseled patient how to take these medications. New Prescriptions    DICYCLOMINE (BENTYL) 10 MG CAPSULE    Take 1 capsule by mouth 4 times daily    FAMOTIDINE (PEPCID) 20 MG TABLET    Take 1 tablet by mouth daily       CLINICAL IMPRESSION  1. Periumbilical abdominal pain    2. Biliary sludge    3. Diverticulosis    4. Kidney cysts        Blood pressure (!) 150/94, pulse 85, temperature 98.5 °F (36.9 °C), temperature source Oral, resp. rate 18, height 5' 5\" (1.651 m), weight 152 lb (68.9 kg), SpO2 97 %, not currently breastfeeding. DISPOSITION  Tato Jain was discharged to home in stable condition.         [place]ound, DO  04/06/22 0495

## 2022-04-13 ENCOUNTER — OFFICE VISIT (OUTPATIENT)
Dept: FAMILY MEDICINE CLINIC | Age: 87
End: 2022-04-13
Payer: MEDICARE

## 2022-04-13 VITALS
OXYGEN SATURATION: 97 % | HEART RATE: 68 BPM | SYSTOLIC BLOOD PRESSURE: 138 MMHG | WEIGHT: 152.6 LBS | DIASTOLIC BLOOD PRESSURE: 64 MMHG | BODY MASS INDEX: 25.43 KG/M2 | HEIGHT: 65 IN

## 2022-04-13 DIAGNOSIS — R10.84 GENERALIZED ABDOMINAL PAIN: Primary | ICD-10-CM

## 2022-04-13 PROCEDURE — G8427 DOCREV CUR MEDS BY ELIG CLIN: HCPCS | Performed by: FAMILY MEDICINE

## 2022-04-13 PROCEDURE — 4040F PNEUMOC VAC/ADMIN/RCVD: CPT | Performed by: FAMILY MEDICINE

## 2022-04-13 PROCEDURE — 1123F ACP DISCUSS/DSCN MKR DOCD: CPT | Performed by: FAMILY MEDICINE

## 2022-04-13 PROCEDURE — G8417 CALC BMI ABV UP PARAM F/U: HCPCS | Performed by: FAMILY MEDICINE

## 2022-04-13 PROCEDURE — 1036F TOBACCO NON-USER: CPT | Performed by: FAMILY MEDICINE

## 2022-04-13 PROCEDURE — 99214 OFFICE O/P EST MOD 30 MIN: CPT | Performed by: FAMILY MEDICINE

## 2022-04-13 PROCEDURE — 1090F PRES/ABSN URINE INCON ASSESS: CPT | Performed by: FAMILY MEDICINE

## 2022-04-13 ASSESSMENT — ENCOUNTER SYMPTOMS
CONSTIPATION: 0
DIARRHEA: 0
VOMITING: 0
ABDOMINAL PAIN: 0
NAUSEA: 0

## 2022-04-13 ASSESSMENT — PATIENT HEALTH QUESTIONNAIRE - PHQ9
SUM OF ALL RESPONSES TO PHQ QUESTIONS 1-9: 0
9. THOUGHTS THAT YOU WOULD BE BETTER OFF DEAD, OR OF HURTING YOURSELF: 0
4. FEELING TIRED OR HAVING LITTLE ENERGY: 0
8. MOVING OR SPEAKING SO SLOWLY THAT OTHER PEOPLE COULD HAVE NOTICED. OR THE OPPOSITE, BEING SO FIGETY OR RESTLESS THAT YOU HAVE BEEN MOVING AROUND A LOT MORE THAN USUAL: 0
SUM OF ALL RESPONSES TO PHQ9 QUESTIONS 1 & 2: 0
6. FEELING BAD ABOUT YOURSELF - OR THAT YOU ARE A FAILURE OR HAVE LET YOURSELF OR YOUR FAMILY DOWN: 0
1. LITTLE INTEREST OR PLEASURE IN DOING THINGS: 0
5. POOR APPETITE OR OVEREATING: 0
3. TROUBLE FALLING OR STAYING ASLEEP: 0
2. FEELING DOWN, DEPRESSED OR HOPELESS: 0
7. TROUBLE CONCENTRATING ON THINGS, SUCH AS READING THE NEWSPAPER OR WATCHING TELEVISION: 0
SUM OF ALL RESPONSES TO PHQ QUESTIONS 1-9: 0
SUM OF ALL RESPONSES TO PHQ QUESTIONS 1-9: 0
10. IF YOU CHECKED OFF ANY PROBLEMS, HOW DIFFICULT HAVE THESE PROBLEMS MADE IT FOR YOU TO DO YOUR WORK, TAKE CARE OF THINGS AT HOME, OR GET ALONG WITH OTHER PEOPLE: 0
SUM OF ALL RESPONSES TO PHQ QUESTIONS 1-9: 0

## 2022-04-13 NOTE — PROGRESS NOTES
Patient:  Lisa lyon 80 y.oNasrin Rodrigues presents today with the following Chief Complaint(s):  Chief Complaint   Patient presents with   [address] from Hospital     pt states that she was in [facility] for abdominal pain. she states that she is feeling better today. Patient is here to follow-up from her 2 emergency room visits. She was in the ER on 3/31/2022 and 4/6/2022. Both visits including notes and labs and diagnostics were reviewed on her first CAT scan it showed thickening and inflammation of the bowel wall consistent with enteritis. She was given Cipro and Flagyl. She did not tolerate these medicines which caused diarrhea. When she was seen in follow-up on 4/6/2022 she was still having intense pain and was tender on exam and so was sent back to the emergency room. The second CAT scan did not show evidence of enteritis or diverticulitis. She did have a gallbladder ultrasound which showed gallbladder sludge. Patient had similar findings in 2019. Patient was seen by Lakeland NELSON at that time. She even had a HIDA scan that came back normal.  Patient was given 2 medicines when she left the emergency room on April 6, a prescription for Bentyl which she has been taking 4 times a day and Pepcid she is taking once a day. She says that her pain has resolved. She denies any nausea or vomiting diarrhea.   Patient's labs were reviewed and were unremarkable        Current Outpatient Medications   Medication Sig Dispense Refill    dicyclomine (BENTYL) 10 MG capsule Take 1 capsule by mouth 4 times daily 40 capsule 0    famotidine (PEPCID) 20 MG tablet Take 1 tablet by mouth daily 20 tablet 0    metroNIDAZOLE (FLAGYL) 500 MG tablet Take 1 tablet by mouth 3 times daily for 14 days 42 tablet 0    ipratropium (ATROVENT) 0.03 % nasal spray 2 sprays by Each Nostril route every 12 hours 1 Bottle 3    isosorbide mononitrate (IMDUR) 30 MG extended release tablet Take 15 mg by mouth       aspirin 81 MG tablet Take 81 mg by mouth daily      amLODIPine (NORVASC) 2.5 MG tablet Take 2.5 mg by mouth daily.  metoprolol (LOPRESSOR) 50 MG tablet Take 50 mg by mouth 2 times daily.  rosuvastatin (CRESTOR) 5 MG tablet Take 1 tablet by mouth daily. 60 tablet 0     No current facility-administered medications for this visit. Patients past medical history, surgical history, family history, medications and allergies were all reviewed and updated as appropriate today. Review of Systems   Constitutional: Negative for fever. Gastrointestinal: Negative for abdominal pain, constipation, diarrhea, nausea and vomiting. Physical Exam  Vitals reviewed. Constitutional:       General: She is not in acute distress. Appearance: She is not ill-appearing or toxic-appearing. Cardiovascular:      Rate and Rhythm: Normal rate and regular rhythm. Heart sounds: Normal heart sounds. Pulmonary:      Breath sounds: Normal breath sounds. Abdominal:      General: Bowel sounds are normal. There is no distension. Palpations: Abdomen is soft. Tenderness: There is no abdominal tenderness. There is no guarding or rebound. Neurological:      Mental Status: She is alert. Vitals:    04/13/22 1434   BP: 138/64   Pulse: 68   SpO2: 97%   Weight: 152 lb 9.6 oz (69.2 kg)   Height: 5' 5\" (1.651 m)       Assessment/Plan:   Gomez Pichardo was seen today for follow-up from hospital.    Diagnoses and all orders for this visit:    Generalized abdominal pain, improved. Patient said that when her abdomen was tender it was her lower quadrant and more so on the left side. There is no evidence of diverticulitis. She is not tender in these areas today. She is definitely not tender in the right upper quadrant. The findings on her ultrasound are not matching her clinical findings. Today she is pain-free. She had a HIDA scan done in 2019 that showed normal functioning of the gallbladder.   There is no evidence of gallbladder wall thickening or cholecystitis on ultrasound or CAT scan. For now, patient will continue taking Bentyl, she was told that she could reduce that to twice a day. At this time I do not think it is necessary that she see a surgeon.   We will continue to monitor close and will let me know if her symptoms recur    I spent a total of 30* minutes with the patient, reviewing previous notes, consults, test results, imaging ,discussing chronic and acute conditions

## 2022-08-08 NOTE — TELEPHONE ENCOUNTER
LM for the pt to contact the office. This was being prescribed by her ENT who had retired. Need to know if she has found a new ENT yet.

## 2022-08-12 RX ORDER — IPRATROPIUM BROMIDE 21 UG/1
SPRAY, METERED NASAL
Refills: 3 | OUTPATIENT
Start: 2022-08-12

## 2022-09-28 ENCOUNTER — OFFICE VISIT (OUTPATIENT)
Dept: FAMILY MEDICINE CLINIC | Age: 87
End: 2022-09-28
Payer: MEDICARE

## 2022-09-28 VITALS
WEIGHT: 146.8 LBS | DIASTOLIC BLOOD PRESSURE: 74 MMHG | HEART RATE: 70 BPM | HEIGHT: 65 IN | SYSTOLIC BLOOD PRESSURE: 150 MMHG | BODY MASS INDEX: 24.46 KG/M2 | OXYGEN SATURATION: 98 %

## 2022-09-28 DIAGNOSIS — Z23 NEED FOR INFLUENZA VACCINATION: ICD-10-CM

## 2022-09-28 DIAGNOSIS — R10.12 LEFT UPPER QUADRANT ABDOMINAL PAIN: Primary | ICD-10-CM

## 2022-09-28 PROCEDURE — G8420 CALC BMI NORM PARAMETERS: HCPCS | Performed by: FAMILY MEDICINE

## 2022-09-28 PROCEDURE — 1123F ACP DISCUSS/DSCN MKR DOCD: CPT | Performed by: FAMILY MEDICINE

## 2022-09-28 PROCEDURE — G8427 DOCREV CUR MEDS BY ELIG CLIN: HCPCS | Performed by: FAMILY MEDICINE

## 2022-09-28 PROCEDURE — 1090F PRES/ABSN URINE INCON ASSESS: CPT | Performed by: FAMILY MEDICINE

## 2022-09-28 PROCEDURE — 99214 OFFICE O/P EST MOD 30 MIN: CPT | Performed by: FAMILY MEDICINE

## 2022-09-28 PROCEDURE — 90674 CCIIV4 VAC NO PRSV 0.5 ML IM: CPT | Performed by: FAMILY MEDICINE

## 2022-09-28 PROCEDURE — G0008 ADMIN INFLUENZA VIRUS VAC: HCPCS | Performed by: FAMILY MEDICINE

## 2022-09-28 PROCEDURE — 1036F TOBACCO NON-USER: CPT | Performed by: FAMILY MEDICINE

## 2022-09-28 RX ORDER — FAMOTIDINE 20 MG/1
20 TABLET, FILM COATED ORAL DAILY
Qty: 20 TABLET | Refills: 3 | Status: ON HOLD | OUTPATIENT
Start: 2022-09-28 | End: 2022-10-09 | Stop reason: HOSPADM

## 2022-09-28 RX ORDER — DICYCLOMINE HYDROCHLORIDE 10 MG/1
10 CAPSULE ORAL 4 TIMES DAILY
Qty: 40 CAPSULE | Refills: 2 | Status: ON HOLD | OUTPATIENT
Start: 2022-09-28 | End: 2022-10-09 | Stop reason: HOSPADM

## 2022-09-28 ASSESSMENT — ENCOUNTER SYMPTOMS
ABDOMINAL PAIN: 1
BLOOD IN STOOL: 0
CONSTIPATION: 0
ANAL BLEEDING: 0
NAUSEA: 0
DIARRHEA: 0
ABDOMINAL DISTENTION: 1
VOMITING: 0

## 2022-09-28 NOTE — PROGRESS NOTES
Vaccine Information Sheet, \"Influenza - Inactivated\"  given to Jose Angel Acevedo, or parent/legal guardian of  Jose Angel Acevedo and verbalized understanding. Patient responses:    Have you ever had a reaction to a flu vaccine? No  Do you have any current illness? No  Have you ever had Guillian Montague Syndrome? No  Do you have a serious allergy to any of the follow: Neomycin, Polymyxin, Thimerosal, eggs or egg products? No    Flu vaccine given per order. Please see immunization tab. Risks and benefits explained. Current VIS given.

## 2022-09-28 NOTE — PROGRESS NOTES
Patient:  Matheus lyon 80 y.o. [facility] presents today with the following Chief Complaint(s):  Chief Complaint   Patient presents with    Abdominal Pain     Pt states that she has been having lower abdominal pain in the left side. She states that she was given Bentyl and Pepcid the last time this happened. Patient is here for evaluation of intermittent abdominal pain. She was in the emergency room for 2 visits in March and early April. Those visits including notes, imaging and labs were reviewed. Patient was given a prescription for Bentyl and Pepcid. She took those medicines on a as needed basis and that helped her symptoms. She is here today because she continues to have intermittent abdominal pain. She says this is the same pattern that she has had for many many years. She says she has days where she feels queasy and bloated and burning pain in her lower abdomen and she will eat some yogurt and her pain will gradually improve. She has days where she has no issues at all. There are certain foods that are greasy that will make her discomfort worse, yogurt makes it better. She denies fever, diarrhea, constipation. She says her bowels are moving normally. She drinks prune juice in the morning and that helps keep her regular. She denies melena or hematochezia. Her pain is not escalating. She had 2 CAT scans a gallbladder ultrasound earlier this year. The first CAT scan showed some enteritis, the follow-up CAT scan did not. Patient says that her pain is not severe enough that she thinks that she needs to have a CAT scan today.   Today she is not having abdominal pain, she just feels a little bloated        Current Outpatient Medications   Medication Sig Dispense Refill    famotidine (PEPCID) 20 MG tablet Take 1 tablet by mouth daily 20 tablet 3    dicyclomine (BENTYL) 10 MG capsule Take 1 capsule by mouth 4 times daily 40 capsule 2    ipratropium (ATROVENT) 0.03 % nasal spray 2 sprays by Each Nostril route every 12 hours 1 Bottle 3    isosorbide mononitrate (IMDUR) 30 MG extended release tablet Take 15 mg by mouth       aspirin 81 MG tablet Take 81 mg by mouth daily      amLODIPine (NORVASC) 2.5 MG tablet Take 2.5 mg by mouth daily. metoprolol (LOPRESSOR) 50 MG tablet Take 50 mg by mouth 2 times daily. rosuvastatin (CRESTOR) 5 MG tablet Take 1 tablet by mouth daily. 60 tablet 0     No current facility-administered medications for this visit. Patients past medical history, surgical history, family history, medications and allergies were all reviewed and updated as appropriate today. Review of Systems   Constitutional:  Negative for fever. Gastrointestinal:  Positive for abdominal distention and abdominal pain. Negative for anal bleeding, blood in stool, constipation, diarrhea, nausea and vomiting. Physical Exam  Vitals reviewed. Constitutional:       General: She is not in acute distress. Appearance: She is not ill-appearing or toxic-appearing. Cardiovascular:      Rate and Rhythm: Normal rate and regular rhythm. Heart sounds: Normal heart sounds. Pulmonary:      Effort: Pulmonary effort is normal.      Breath sounds: Normal breath sounds. Abdominal:      General: Bowel sounds are normal. There is no distension. Palpations: Abdomen is soft. There is no mass. Tenderness: There is no abdominal tenderness. There is no right CVA tenderness, left CVA tenderness, guarding or rebound. Neurological:      Mental Status: She is alert. Vitals:    09/28/22 0832   BP: (!) 150/74   Pulse: 70   SpO2: 98%   Weight: 146 lb 12.8 oz (66.6 kg)   Height: 5' 5\" (1.651 m)       Assessment/Plan:   Pablo Yu was seen today for abdominal pain. Diagnoses and all orders for this visit:    Left upper quadrant abdominal pain, today her symptoms do not warrant any imaging or lab work.   She is very comfortable her abdomen exam is normal no tenderness elicited today. Patient and I discussed that if her pain worsens and escalates she is to let me know. We will try Pepcid and Bentyl on a as needed basis. If her symptoms worsen or escalate she will follow-up  -     famotidine (PEPCID) 20 MG tablet; Take 1 tablet by mouth daily  -     dicyclomine (BENTYL) 10 MG capsule;  Take 1 capsule by mouth 4 times daily    Need for influenza vaccination  -     Influenza, FLUCELVAX, (age 10 mo+), IM, Preservative Free, 0.5 mL    I spent a total of 30* minutes with the patient, reviewing previous notes, consults, test results, imaging ,discussing chronic and acute conditions

## 2022-10-04 ENCOUNTER — TELEPHONE (OUTPATIENT)
Dept: FAMILY MEDICINE CLINIC | Age: 87
End: 2022-10-04

## 2022-10-04 NOTE — TELEPHONE ENCOUNTER
When I called the pharmacy, they said that the pt was misinformed. They said that the medication is ready to be picked up. I had already spoken with the pt about this. She states that she is no longer wanting to take the medications. She is just wanting to have the surgery done. I asked her who told her that she would need to have the gall bladder removed. She states that she was in and out of the hospital for this and the last time she was referred to Dr. Rashard Cotter. She asked if she could call there and be scheduled. I advised her that she could definitely call his office and get more information from him. She states that she will call his office in the morning to be scheduled. Nothing further is needed from our office.

## 2022-10-04 NOTE — TELEPHONE ENCOUNTER
I spoke with the pt. She states that in the mornings, she is still nauseous. She states that the RUQ hurts a lot when she bends over. She states that the Pepcid has not been helping, she has been feeling dizzy and even more nauseated while taking it. When she went to get the Bentyl, the pharmacy told her that \"Dr. Vira Chavez could not prescribe this for her and they cancelled out her prescription\". She states that she is not really wanting to take any meds. She states that the the burning and pain across her abdomen si the worst part.

## 2022-10-04 NOTE — TELEPHONE ENCOUNTER
----- Message from Talia Jim sent at 10/4/2022  9:54 AM EDT -----  Subject: Message to Provider    QUESTIONS  Information for Provider? Patient calling for pcp Juan Miguel Ramesh about not   getting one of medications and the other making her feel worse. States   continued abdominal discomfort with bending and movement. Please call to   discuss what next steps for treatment are.   ---------------------------------------------------------------------------  --------------  Bart Rabago INFO  [phone]; OK to leave message on voicemail  ---------------------------------------------------------------------------  --------------  SCRIPT ANSWERS  Relationship to Patient?  Self

## 2022-10-06 ENCOUNTER — HOSPITAL ENCOUNTER (INPATIENT)
Age: 87
LOS: 3 days | Discharge: HOME | DRG: 372 | End: 2022-10-09
Attending: EMERGENCY MEDICINE | Admitting: INTERNAL MEDICINE
Payer: MEDICARE

## 2022-10-06 ENCOUNTER — APPOINTMENT (OUTPATIENT)
Dept: CT IMAGING | Age: 87
DRG: 372 | End: 2022-10-06
Payer: MEDICARE

## 2022-10-06 DIAGNOSIS — K52.9 ENTERITIS: Primary | ICD-10-CM

## 2022-10-06 PROBLEM — R10.9 ABDOMINAL PAIN: Status: ACTIVE | Noted: 2022-10-06

## 2022-10-06 LAB
A/G RATIO: 1.4 (ref 1.1–2.2)
ALBUMIN SERPL-MCNC: 4.3 G/DL (ref 3.4–5)
ALP BLD-CCNC: 76 U/L (ref 40–129)
ALT SERPL-CCNC: 14 U/L (ref 10–40)
ANION GAP SERPL CALCULATED.3IONS-SCNC: 9 MMOL/L (ref 3–16)
AST SERPL-CCNC: 24 U/L (ref 15–37)
BASOPHILS ABSOLUTE: 0.1 K/UL (ref 0–0.2)
BASOPHILS RELATIVE PERCENT: 0.5 %
BILIRUB SERPL-MCNC: 0.7 MG/DL (ref 0–1)
BILIRUBIN URINE: NEGATIVE
BLOOD, URINE: NEGATIVE
BUN BLDV-MCNC: 14 MG/DL (ref 7–20)
CALCIUM SERPL-MCNC: 11.3 MG/DL (ref 8.3–10.6)
CHLORIDE BLD-SCNC: 100 MMOL/L (ref 99–110)
CLARITY: CLEAR
CO2: 27 MMOL/L (ref 21–32)
COLOR: YELLOW
CREAT SERPL-MCNC: 0.9 MG/DL (ref 0.6–1.2)
EOSINOPHILS ABSOLUTE: 0.4 K/UL (ref 0–0.6)
EOSINOPHILS RELATIVE PERCENT: 3.1 %
GFR AFRICAN AMERICAN: >60
GFR NON-AFRICAN AMERICAN: 59
GLUCOSE BLD-MCNC: 119 MG/DL (ref 70–99)
GLUCOSE URINE: NEGATIVE MG/DL
HCT VFR BLD CALC: 51.9 % (ref 36–48)
HEMOGLOBIN: 17.1 G/DL (ref 12–16)
KETONES, URINE: NEGATIVE MG/DL
LACTIC ACID: 2.6 MMOL/L (ref 0.4–2)
LEUKOCYTE ESTERASE, URINE: NEGATIVE
LIPASE: 37 U/L (ref 13–60)
LYMPHOCYTES ABSOLUTE: 1.3 K/UL (ref 1–5.1)
LYMPHOCYTES RELATIVE PERCENT: 10.9 %
MCH RBC QN AUTO: 29.2 PG (ref 26–34)
MCHC RBC AUTO-ENTMCNC: 32.9 G/DL (ref 31–36)
MCV RBC AUTO: 88.7 FL (ref 80–100)
MICROSCOPIC EXAMINATION: NORMAL
MONOCYTES ABSOLUTE: 0.6 K/UL (ref 0–1.3)
MONOCYTES RELATIVE PERCENT: 5.2 %
NEUTROPHILS ABSOLUTE: 9.8 K/UL (ref 1.7–7.7)
NEUTROPHILS RELATIVE PERCENT: 80.3 %
NITRITE, URINE: NEGATIVE
PDW BLD-RTO: 13.9 % (ref 12.4–15.4)
PH UA: 6.5 (ref 5–8)
PLATELET # BLD: 160 K/UL (ref 135–450)
PMV BLD AUTO: 10.3 FL (ref 5–10.5)
POTASSIUM REFLEX MAGNESIUM: 4.4 MMOL/L (ref 3.5–5.1)
PROTEIN UA: NEGATIVE MG/DL
RBC # BLD: 5.85 M/UL (ref 4–5.2)
SODIUM BLD-SCNC: 136 MMOL/L (ref 136–145)
SPECIFIC GRAVITY UA: <=1.005 (ref 1–1.03)
TOTAL PROTEIN: 7.3 G/DL (ref 6.4–8.2)
URINE REFLEX TO CULTURE: NORMAL
URINE TYPE: NORMAL
UROBILINOGEN, URINE: 0.2 E.U./DL
WBC # BLD: 12.3 K/UL (ref 4–11)

## 2022-10-06 PROCEDURE — 6370000000 HC RX 637 (ALT 250 FOR IP): Performed by: INTERNAL MEDICINE

## 2022-10-06 PROCEDURE — 2580000003 HC RX 258: Performed by: INTERNAL MEDICINE

## 2022-10-06 PROCEDURE — 1200000000 HC SEMI PRIVATE

## 2022-10-06 PROCEDURE — 81003 URINALYSIS AUTO W/O SCOPE: CPT

## 2022-10-06 PROCEDURE — 83690 ASSAY OF LIPASE: CPT

## 2022-10-06 PROCEDURE — 96375 TX/PRO/DX INJ NEW DRUG ADDON: CPT

## 2022-10-06 PROCEDURE — 99222 1ST HOSP IP/OBS MODERATE 55: CPT | Performed by: SURGERY

## 2022-10-06 PROCEDURE — 80053 COMPREHEN METABOLIC PANEL: CPT

## 2022-10-06 PROCEDURE — APPSS45 APP SPLIT SHARED TIME 31-45 MINUTES: Performed by: CLINICAL NURSE SPECIALIST

## 2022-10-06 PROCEDURE — 6360000004 HC RX CONTRAST MEDICATION: Performed by: EMERGENCY MEDICINE

## 2022-10-06 PROCEDURE — 2580000003 HC RX 258: Performed by: EMERGENCY MEDICINE

## 2022-10-06 PROCEDURE — 99285 EMERGENCY DEPT VISIT HI MDM: CPT

## 2022-10-06 PROCEDURE — 85025 COMPLETE CBC W/AUTO DIFF WBC: CPT

## 2022-10-06 PROCEDURE — 6360000002 HC RX W HCPCS: Performed by: INTERNAL MEDICINE

## 2022-10-06 PROCEDURE — 6360000002 HC RX W HCPCS: Performed by: EMERGENCY MEDICINE

## 2022-10-06 PROCEDURE — 96374 THER/PROPH/DIAG INJ IV PUSH: CPT

## 2022-10-06 PROCEDURE — 83605 ASSAY OF LACTIC ACID: CPT

## 2022-10-06 PROCEDURE — 74177 CT ABD & PELVIS W/CONTRAST: CPT

## 2022-10-06 RX ORDER — MORPHINE SULFATE 2 MG/ML
2 INJECTION, SOLUTION INTRAMUSCULAR; INTRAVENOUS
Status: DISCONTINUED | OUTPATIENT
Start: 2022-10-06 | End: 2022-10-09 | Stop reason: HOSPADM

## 2022-10-06 RX ORDER — ASPIRIN 81 MG/1
81 TABLET ORAL DAILY
Status: DISCONTINUED | OUTPATIENT
Start: 2022-10-06 | End: 2022-10-09 | Stop reason: HOSPADM

## 2022-10-06 RX ORDER — FAMOTIDINE 20 MG/1
20 TABLET, FILM COATED ORAL DAILY
Status: DISCONTINUED | OUTPATIENT
Start: 2022-10-06 | End: 2022-10-09 | Stop reason: HOSPADM

## 2022-10-06 RX ORDER — ACETAMINOPHEN 650 MG/1
650 SUPPOSITORY RECTAL EVERY 6 HOURS PRN
Status: DISCONTINUED | OUTPATIENT
Start: 2022-10-06 | End: 2022-10-09 | Stop reason: HOSPADM

## 2022-10-06 RX ORDER — MORPHINE SULFATE 4 MG/ML
4 INJECTION, SOLUTION INTRAMUSCULAR; INTRAVENOUS
Status: DISCONTINUED | OUTPATIENT
Start: 2022-10-06 | End: 2022-10-09 | Stop reason: HOSPADM

## 2022-10-06 RX ORDER — SODIUM CHLORIDE 9 MG/ML
INJECTION, SOLUTION INTRAVENOUS CONTINUOUS
Status: DISCONTINUED | OUTPATIENT
Start: 2022-10-06 | End: 2022-10-07

## 2022-10-06 RX ORDER — SODIUM CHLORIDE 9 MG/ML
INJECTION, SOLUTION INTRAVENOUS PRN
Status: DISCONTINUED | OUTPATIENT
Start: 2022-10-06 | End: 2022-10-09 | Stop reason: HOSPADM

## 2022-10-06 RX ORDER — ISOSORBIDE MONONITRATE 30 MG/1
15 TABLET, EXTENDED RELEASE ORAL DAILY
Status: DISCONTINUED | OUTPATIENT
Start: 2022-10-06 | End: 2022-10-09 | Stop reason: HOSPADM

## 2022-10-06 RX ORDER — POLYETHYLENE GLYCOL 3350 17 G/17G
17 POWDER, FOR SOLUTION ORAL DAILY PRN
Status: DISCONTINUED | OUTPATIENT
Start: 2022-10-06 | End: 2022-10-09 | Stop reason: HOSPADM

## 2022-10-06 RX ORDER — ENOXAPARIN SODIUM 100 MG/ML
40 INJECTION SUBCUTANEOUS DAILY
Status: DISCONTINUED | OUTPATIENT
Start: 2022-10-06 | End: 2022-10-09 | Stop reason: HOSPADM

## 2022-10-06 RX ORDER — SODIUM CHLORIDE 0.9 % (FLUSH) 0.9 %
5-40 SYRINGE (ML) INJECTION PRN
Status: DISCONTINUED | OUTPATIENT
Start: 2022-10-06 | End: 2022-10-09 | Stop reason: HOSPADM

## 2022-10-06 RX ORDER — PROCHLORPERAZINE EDISYLATE 5 MG/ML
10 INJECTION INTRAMUSCULAR; INTRAVENOUS EVERY 6 HOURS PRN
Status: DISCONTINUED | OUTPATIENT
Start: 2022-10-06 | End: 2022-10-09 | Stop reason: HOSPADM

## 2022-10-06 RX ORDER — MORPHINE SULFATE 4 MG/ML
4 INJECTION, SOLUTION INTRAMUSCULAR; INTRAVENOUS ONCE
Status: COMPLETED | OUTPATIENT
Start: 2022-10-06 | End: 2022-10-06

## 2022-10-06 RX ORDER — ACETAMINOPHEN 325 MG/1
650 TABLET ORAL EVERY 6 HOURS PRN
Status: DISCONTINUED | OUTPATIENT
Start: 2022-10-06 | End: 2022-10-09 | Stop reason: HOSPADM

## 2022-10-06 RX ORDER — DICYCLOMINE HYDROCHLORIDE 10 MG/1
10 CAPSULE ORAL 4 TIMES DAILY
Status: DISCONTINUED | OUTPATIENT
Start: 2022-10-06 | End: 2022-10-09 | Stop reason: HOSPADM

## 2022-10-06 RX ORDER — ROSUVASTATIN CALCIUM 10 MG/1
10 TABLET, COATED ORAL DAILY
Status: DISCONTINUED | OUTPATIENT
Start: 2022-10-06 | End: 2022-10-09 | Stop reason: HOSPADM

## 2022-10-06 RX ORDER — METOCLOPRAMIDE HYDROCHLORIDE 5 MG/ML
10 INJECTION INTRAMUSCULAR; INTRAVENOUS ONCE
Status: COMPLETED | OUTPATIENT
Start: 2022-10-06 | End: 2022-10-06

## 2022-10-06 RX ORDER — AMLODIPINE BESYLATE 2.5 MG/1
2.5 TABLET ORAL DAILY
Status: DISCONTINUED | OUTPATIENT
Start: 2022-10-06 | End: 2022-10-09 | Stop reason: HOSPADM

## 2022-10-06 RX ORDER — 0.9 % SODIUM CHLORIDE 0.9 %
1000 INTRAVENOUS SOLUTION INTRAVENOUS ONCE
Status: COMPLETED | OUTPATIENT
Start: 2022-10-06 | End: 2022-10-06

## 2022-10-06 RX ORDER — SODIUM CHLORIDE 0.9 % (FLUSH) 0.9 %
5-40 SYRINGE (ML) INJECTION EVERY 12 HOURS SCHEDULED
Status: DISCONTINUED | OUTPATIENT
Start: 2022-10-06 | End: 2022-10-09 | Stop reason: HOSPADM

## 2022-10-06 RX ORDER — IPRATROPIUM BROMIDE 21 UG/1
2 SPRAY, METERED NASAL EVERY 12 HOURS
Status: DISCONTINUED | OUTPATIENT
Start: 2022-10-06 | End: 2022-10-06

## 2022-10-06 RX ADMIN — PIPERACILLIN AND TAZOBACTAM 3375 MG: 3; .375 INJECTION, POWDER, LYOPHILIZED, FOR SOLUTION INTRAVENOUS at 11:39

## 2022-10-06 RX ADMIN — AMLODIPINE BESYLATE 2.5 MG: 2.5 TABLET ORAL at 09:52

## 2022-10-06 RX ADMIN — METOPROLOL TARTRATE 75 MG: 25 TABLET, FILM COATED ORAL at 09:49

## 2022-10-06 RX ADMIN — ENOXAPARIN SODIUM 40 MG: 100 INJECTION SUBCUTANEOUS at 09:52

## 2022-10-06 RX ADMIN — SODIUM CHLORIDE: 9 INJECTION, SOLUTION INTRAVENOUS at 07:42

## 2022-10-06 RX ADMIN — DICYCLOMINE HYDROCHLORIDE 10 MG: 10 CAPSULE ORAL at 13:13

## 2022-10-06 RX ADMIN — DICYCLOMINE HYDROCHLORIDE 10 MG: 10 CAPSULE ORAL at 20:55

## 2022-10-06 RX ADMIN — PIPERACILLIN AND TAZOBACTAM 3375 MG: 3; .375 INJECTION, POWDER, LYOPHILIZED, FOR SOLUTION INTRAVENOUS at 20:57

## 2022-10-06 RX ADMIN — Medication 10 ML: at 09:53

## 2022-10-06 RX ADMIN — PROCHLORPERAZINE EDISYLATE 10 MG: 5 INJECTION INTRAMUSCULAR; INTRAVENOUS at 13:15

## 2022-10-06 RX ADMIN — ROSUVASTATIN CALCIUM 10 MG: 10 TABLET, FILM COATED ORAL at 09:50

## 2022-10-06 RX ADMIN — MORPHINE SULFATE 4 MG: 4 INJECTION, SOLUTION INTRAMUSCULAR; INTRAVENOUS at 07:36

## 2022-10-06 RX ADMIN — SODIUM CHLORIDE 1000 ML: 9 INJECTION, SOLUTION INTRAVENOUS at 03:54

## 2022-10-06 RX ADMIN — ASPIRIN 81 MG: 81 TABLET, COATED ORAL at 09:49

## 2022-10-06 RX ADMIN — IOPAMIDOL 75 ML: 755 INJECTION, SOLUTION INTRAVENOUS at 04:13

## 2022-10-06 RX ADMIN — ISOSORBIDE MONONITRATE 15 MG: 30 TABLET, EXTENDED RELEASE ORAL at 09:49

## 2022-10-06 RX ADMIN — MORPHINE SULFATE 4 MG: 4 INJECTION, SOLUTION INTRAMUSCULAR; INTRAVENOUS at 04:01

## 2022-10-06 RX ADMIN — MORPHINE SULFATE 2 MG: 2 INJECTION, SOLUTION INTRAMUSCULAR; INTRAVENOUS at 13:15

## 2022-10-06 RX ADMIN — PIPERACILLIN AND TAZOBACTAM 3375 MG: 3; .375 INJECTION, POWDER, FOR SOLUTION INTRAVENOUS at 05:45

## 2022-10-06 RX ADMIN — DICYCLOMINE HYDROCHLORIDE 10 MG: 10 CAPSULE ORAL at 17:04

## 2022-10-06 RX ADMIN — METOCLOPRAMIDE 10 MG: 5 INJECTION, SOLUTION INTRAMUSCULAR; INTRAVENOUS at 03:57

## 2022-10-06 ASSESSMENT — PAIN DESCRIPTION - DESCRIPTORS
DESCRIPTORS: DISCOMFORT
DESCRIPTORS: CRUSHING

## 2022-10-06 ASSESSMENT — ENCOUNTER SYMPTOMS
NAUSEA: 1
EYE PAIN: 0
SHORTNESS OF BREATH: 0
SORE THROAT: 0
CONSTIPATION: 0
BACK PAIN: 1
DIARRHEA: 0
COLOR CHANGE: 0
RHINORRHEA: 1
ABDOMINAL PAIN: 1
COUGH: 0

## 2022-10-06 ASSESSMENT — PAIN DESCRIPTION - LOCATION
LOCATION: ABDOMEN

## 2022-10-06 ASSESSMENT — PAIN SCALES - GENERAL
PAINLEVEL_OUTOF10: 3
PAINLEVEL_OUTOF10: 0
PAINLEVEL_OUTOF10: 3

## 2022-10-06 ASSESSMENT — PAIN DESCRIPTION - PAIN TYPE
TYPE: ACUTE PAIN
TYPE: ACUTE PAIN

## 2022-10-06 ASSESSMENT — PAIN DESCRIPTION - FREQUENCY
FREQUENCY: CONTINUOUS
FREQUENCY: CONTINUOUS

## 2022-10-06 ASSESSMENT — PAIN - FUNCTIONAL ASSESSMENT
PAIN_FUNCTIONAL_ASSESSMENT: PREVENTS OR INTERFERES SOME ACTIVE ACTIVITIES AND ADLS
PAIN_FUNCTIONAL_ASSESSMENT: PREVENTS OR INTERFERES SOME ACTIVE ACTIVITIES AND ADLS
PAIN_FUNCTIONAL_ASSESSMENT: 0-10

## 2022-10-06 ASSESSMENT — PAIN DESCRIPTION - ORIENTATION
ORIENTATION: RIGHT;LEFT;MID;LOWER
ORIENTATION: LEFT;LOWER;RIGHT

## 2022-10-06 NOTE — H&P
Hospital Medicine History & Physical      PCP: Lucy Paula MD    Date of Admission: 10/6/2022    Date of Service: Pt seen/examined on  and Admitted to Inpatient with expected LOS greater than two midnights due to medical therapy. Chief Complaint:  Abdominal Pain w/ Nausea      History Of Present Illness:       80 y.o. female who presented to [facility] with a several week hx of generalized lower abdominal pain, progressive and severe at times w/ associated N/V. She denies any Melena or Hematochezia and is still passing flatus. The patient denies any fever/chills or other signs/sxs of systemic illness or identifiable aggravating/alleviating factors. Past Medical History:          Diagnosis Date    Arthritis     ASHD (arteriosclerotic heart disease)     s/p 5 stents placed     Breast cancer ([facility] [address]     left s/p lumpectomy and radiation    Hyperlipidemia     Hypertension        Past Surgical History:          Procedure Laterality Date    BREAST LUMPECTOMY  2004    left     SECTION      CORONARY ANGIOPLASTY WITH STENT PLACEMENT  2009    5 drug-eluting    DILATION AND CURETTAGE OF UTERUS  2017    hysteroscopy, myosure    EYE SURGERY Right 2013    cataract w/ IOL    EYE SURGERY Left 13    cataract with IOL implant    TONSILLECTOMY         Medications Prior to Admission:      Prior to Admission medications    Medication Sig Start Date End Date Taking?  Authorizing Provider   famotidine (PEPCID) 20 MG tablet Take 1 tablet by mouth daily  Patient not taking: Reported on 10/6/2022 9/28/22   Lucy Paula MD   dicyclomine (BENTYL) 10 MG capsule Take 1 capsule by mouth 4 times daily  Patient not taking: Reported on 10/6/2022 9/28/22   Lucy Paula MD   ipratropium (ATROVENT) 0.03 % nasal spray 2 sprays by Each Nostril route every 12 hours  Patient not taking: Reported on 10/6/2022 7/20/21   Lucy Paula MD   isosorbide mononitrate (IMDUR) 30 MG extended release tablet Take 15 mg by mouth  3/20/18   Historical Provider, MD   aspirin 81 MG tablet Take 81 mg by mouth daily    Historical Provider, MD   amLODIPine (NORVASC) 2.5 MG tablet Take 2.5 mg by mouth daily. Historical Provider, MD   metoprolol (LOPRESSOR) 50 MG tablet Take 50 mg by mouth 2 times daily. Historical Provider, MD   rosuvastatin (CRESTOR) 5 MG tablet Take 1 tablet by mouth daily. 9/24/10   REBEKA Duarte       Allergies:  Carbocaine [mepivacaine hcl], Ciprofloxacin, Metronidazole, Prevnar 13 [pneumococcal 13-christina conj vacc], and Zofran [ondansetron hcl]    Social History:      The patient currently lives independently    TOBACCO:   reports that she has never smoked. She has never used smokeless tobacco.  ETOH:   reports no history of alcohol use. Family History:        Reviewed in detail and negative for CVA. Positive as follows:        Problem Relation Age of Onset    Cancer Mother 80        colon    Diabetes Brother     Heart Disease Brother     High Blood Pressure Brother        REVIEW OF SYSTEMS:   Pertinent positives as noted in the HPI. All other systems reviewed and negative. PHYSICAL EXAM:    BP (!) 156/80   Pulse 66   Temp 97.5 °F (36.4 °C) (Oral)   Resp 14   Ht 5' 5\" (1.651 m)   Wt 148 lb (67.1 kg)   SpO2 94%   BMI 24.63 kg/m²     General appearance:  No apparent distress, appears stated age and cooperative. HEENT:  Normal cephalic, atraumatic without obvious deformity. Pupils equal, round, and reactive to light. Extra ocular muscles intact. Conjunctivae/corneas clear. Neck: Supple, with full range of motion. No jugular venous distention. Trachea midline. Respiratory:  Normal respiratory effort. Clear to auscultation, bilaterally without Rales/Wheezes/Rhonchi. Cardiovascular:  Regular rate and rhythm with normal S1/S2 without murmurs, rubs or gallops. Abdomen: Soft, non-tender, non-distended with normal bowel sounds.   Musculoskeletal:  No clubbing, cyanosis or edema bilaterally. Full range of motion without deformity. Skin: Skin color, texture, turgor normal.  No rashes or lesions. Neurologic:  Neurovascularly intact without any focal sensory/motor deficits. Cranial nerves: II-XII intact, grossly non-focal.  Psychiatric:  Alert and oriented, thought content appropriate, normal insight  Capillary Refill: Brisk,< 3 seconds   Peripheral Pulses: +2 palpable, equal bilaterally       CXR:  I have reviewed the CXR with the following interpretation: N/A  EKG:  I have reviewed the EKG with the following interpretation: N/A    Labs:     Recent Labs     10/06/22  0252   WBC 12.3*   HGB 17.1*   HCT 51.9*        Recent Labs     10/06/22  0252      K 4.4      CO2 27   BUN 14   CREATININE 0.9   CALCIUM 11.3*     Recent Labs     10/06/22  0252   AST 24   ALT 14   BILITOT 0.7   ALKPHOS 76     No results for input(s): INR in the last 72 hours. No results for input(s): [place] in the last 72 hours. Urinalysis:      Lab Results   Component Value Date/Time    NITRU Negative 10/06/2022 05:07 AM    WBCUA 3-5 03/31/2022 06:20 PM    RBCUA None seen 03/31/2022 06:20 PM    BLOODU Negative 10/06/2022 05:07 AM    SPECGRAV <=1.005 10/06/2022 05:07 AM    GLUCOSEU Negative 10/06/2022 05:07 AM         Consults:    IP CONSULT TO HOSPITALIST  IP CONSULT TO GENERAL SURGERY      ASSESSMENT:    Active Hospital Problems    Diagnosis Date Noted    Abdominal pain [R10.9] 10/06/2022     Priority: Medium       PLAN:      Enteritis - as seen on admission CT abdomen - independently reviewed. General Surgery consulted and appreciated. Diet advanced to clears. HTN - w/out known CAD and no evidence of active signs/sxs of ischemia/failure. Currently controlled on home meds w/ vitals reviewed and documented as above. HyperLipidemia - controlled on home Statin.  Continue, w/ f/u and med adjustment w/ PCP        DVT Prophylaxis: LMWH  Diet: Diet NPO  Code Status: Full Code      PT/OT Eval Status: not yet ordered. Dispo - Patient is likely to remain in-house at least until Sat/Sunday pending clinical course, subspecialty recs and eventual PT/OT eval/recs when appropriate. Daughter Breezy Oliva present at bedside when seen 6 October. Cecilia Pollack MD    Thank you Parker Severino MD for the opportunity to be involved in this patient's care. If you have any questions or concerns please feel free to contact me at [phone].

## 2022-10-06 NOTE — CONSULTS
Placed call to General Surgery @ 1240  RE: partial bowel obstruction per MD Gina Temple MD called back @ [phone]

## 2022-10-06 NOTE — ED NOTES
Pt ambulated to restroom with steady gait, no signs of distress noted. Pt in ED cart, locked lowest position, rails up for safety, call light within reach, no signs of distress noted at this time.       Cris Mantilla RN  10/06/22 0397

## 2022-10-06 NOTE — ED PROVIDER NOTES
[facility]  ED  EMERGENCY DEPARTMENT ENCOUNTER      Pt Name: Vy Zhu  MRN: [id number]  [place] 1932  Date of evaluation: 10/6/2022  Provider: Autumn Nelson MD    CHIEF COMPLAINT       Chief Complaint   Patient presents with    Abdominal Pain    Nausea         HISTORY OF PRESENT ILLNESS   (Location/Symptom, Timing/Onset,Context/Setting, Quality, Duration, Modifying Factors, Severity)  Note limiting factors. Vy Zhu is a 80 y.o. female who presents to the emergency department for severe abdominal pain bilateral lower quadrants, burning in nature. Started at about 3 weeks ago intermittently, got worse about a week ago. Associated with nausea. She saw her doctor 1 week ago for this and was placed on Pepcid. She never was able to get the bentyl filled. She has soft stools. No blood. Nursing notes were reviewed. REVIEW OF SYSTEMS    (2-9 systems for level 4, 10 or more for level 5)     Review of Systems   Constitutional:  Negative for fever. HENT:  Positive for rhinorrhea (chronic). Negative for sore throat. Eyes:  Negative for pain. Respiratory:  Negative for cough and shortness of breath. Cardiovascular:  Negative for chest pain. Gastrointestinal:  Positive for abdominal pain and nausea. Negative for constipation and diarrhea. Genitourinary:  Negative for dysuria, flank pain and frequency. Musculoskeletal:  Positive for back pain (chronic). Skin:  Negative for color change and rash. Neurological:  Negative for headaches. Hematological:  Bruises/bleeds easily.        PAST MEDICAL HISTORY     Past Medical History:   Diagnosis Date    Arthritis     ASHD (arteriosclerotic heart disease)     s/p 5 stents placed     Breast cancer ([facility] [address]     left s/p lumpectomy and radiation    Hyperlipidemia     Hypertension          SURGICALHISTORY       Past Surgical History:   Procedure Laterality Date    BREAST LUMPECTOMY      left     SECTION CORONARY ANGIOPLASTY WITH STENT PLACEMENT  6/2009 5 drug-eluting    DILATION AND CURETTAGE OF UTERUS  02/28/2017    hysteroscopy, myosure    EYE SURGERY Right 4/2/2013    cataract w/ IOL    EYE SURGERY Left 5/21/13    cataract with IOL implant    TONSILLECTOMY           CURRENT MEDICATIONS       Previous Medications    AMLODIPINE (NORVASC) 2.5 MG TABLET    Take 2.5 mg by mouth daily. ASPIRIN 81 MG TABLET    Take 81 mg by mouth daily    DICYCLOMINE (BENTYL) 10 MG CAPSULE    Take 1 capsule by mouth 4 times daily    FAMOTIDINE (PEPCID) 20 MG TABLET    Take 1 tablet by mouth daily    IPRATROPIUM (ATROVENT) 0.03 % NASAL SPRAY    2 sprays by Each Nostril route every 12 hours    ISOSORBIDE MONONITRATE (IMDUR) 30 MG EXTENDED RELEASE TABLET    Take 15 mg by mouth     METOPROLOL (LOPRESSOR) 50 MG TABLET    Take 50 mg by mouth 2 times daily. ROSUVASTATIN (CRESTOR) 5 MG TABLET    Take 1 tablet by mouth daily. ALLERGIES     Carbocaine [mepivacaine hcl], Ciprofloxacin, Metronidazole, Prevnar 13 [pneumococcal 13-christina conj vacc], and Zofran [ondansetron hcl]    FAMILY HISTORY       Family History   Problem Relation Age of Onset    Cancer Mother 80        colon    Diabetes Brother     Heart Disease Brother     High Blood Pressure Brother           SOCIAL HISTORY       Social History     Socioeconomic History    Marital status:      Spouse name: None    Number of children: None    Years of education: None    Highest education level: None   Tobacco Use    Smoking status: Never    Smokeless tobacco: Never   Vaping Use    Vaping Use: Never used   Substance and Sexual Activity    Alcohol use: No    Drug use: No    Sexual activity: Never   Social History Narrative    Grandson lives w/ her in house.  She is active and independent       SCREENINGS    Meridian Coma Scale  Eye Opening: Spontaneous  Best Verbal Response: Oriented  Best Motor Response: Obeys commands  Jordan Coma Scale Score: 15        PHYSICAL EXAM    (up to 7 for level 4, 8 or more for level 5)     ED Triage Vitals [10/06/22 0237]   BP Temp Temp Source Heart Rate Resp SpO2 Height Weight   (!) 160/83 97.5 °F (36.4 °C) Oral 73 18 96 % 5' 5\" (1.651 m) 148 lb (67.1 kg)       Physical Exam  Vitals and nursing note reviewed. Constitutional:       Appearance: Normal appearance. She is well-developed. She is not ill-appearing. HENT:      Head: Normocephalic and atraumatic. Right Ear: External ear normal.      Left Ear: External ear normal.      Nose: Nose normal.   Eyes:      General: No scleral icterus. Right eye: No discharge. Left eye: No discharge. Conjunctiva/sclera: Conjunctivae normal.   Cardiovascular:      Rate and Rhythm: Normal rate and regular rhythm. Heart sounds: Normal heart sounds. Pulmonary:      Effort: Pulmonary effort is normal. No respiratory distress. Breath sounds: Normal breath sounds. No wheezing or rales. Abdominal:      General: A surgical scar is present. Bowel sounds are normal. There is distension. Palpations: Abdomen is soft. Tenderness: There is generalized abdominal tenderness. There is guarding and rebound. There is no right CVA tenderness or left CVA tenderness. Hernia: No hernia is present. Musculoskeletal:      Cervical back: Neck supple. Skin:     Coloration: Skin is not pale. Neurological:      Mental Status: She is alert.    Psychiatric:         Mood and Affect: Mood normal.         Behavior: Behavior normal.           DIAGNOSTIC RESULTS     EKG: All EKG's are interpreted by the Emergency Department Physician who either signs or Co-signs this chart in the absence of a cardiologist.    12 lead EKG shows     RADIOLOGY:   Non-plain film images such as CT, Ultrasound and MRI are read by the radiologist. Plain radiographic images are visualized and preliminarily interpreted by the emergency physician with the below findings:        Interpretation per the Radiologist below, if available at the time of this note:    CT ABDOMEN PELVIS W IV CONTRAST Additional Contrast? None   Final Result   1. Acute inflammatory or infectious enteritis involving the ileum, with   partial obstruction and reactive mesenteric edema and small volume ascites. 2. Diverticulosis without acute diverticulitis. 3. Atherosclerotic disease including coronary artery involvement. 4. Small hiatal hernia. Moderate gastric distention without obstructive mass. ED BEDSIDE ULTRASOUND:   Performed by ED Physician - none    LABS:  Labs Reviewed   CBC WITH AUTO DIFFERENTIAL - Abnormal; Notable for the following components:       Result Value    WBC 12.3 (*)     RBC 5.85 (*)     Hemoglobin 17.1 (*)     Hematocrit 51.9 (*)     Neutrophils Absolute 9.8 (*)     All other components within normal limits   COMPREHENSIVE METABOLIC PANEL W/ REFLEX TO MG FOR LOW K - Abnormal; Notable for the following components:    Glucose 119 (*)     GFR Non-African American 59 (*)     Calcium 11.3 (*)     All other components within normal limits   LACTIC ACID - Abnormal; Notable for the following components:    Lactic Acid 2.6 (*)     All other components within normal limits   LIPASE   URINALYSIS WITH REFLEX TO CULTURE       All other labs were within normal range or not returned as of this dictation. EMERGENCY DEPARTMENT COURSE and DIFFERENTIAL DIAGNOSIS/MDM:   Vitals:    Vitals:    10/06/22 0407 10/06/22 0437 10/06/22 0507 10/06/22 0608   BP: (!) 140/70 138/61 (!) 150/67 (!) 162/72   Pulse: 71 66 64 63   Resp: 13 16 18 14   Temp:       TempSrc:       SpO2: 94% 92% 93% 98%   Weight:       Height:           Elderly female who comes in of abdominal pain, she appears uncomfortable. Her abdomen is distended, she does have guarding and rebound. Patient is placed on cardiac low-vision pulse oximetry monitoring. Laboratory studies and abdominal CT ordered. She is given medication for pain and for her nausea.   She is given IV fluids. Cardiac monitor as read and interpreted myself showed normal sinus rhythm. Diagnostic work-up is remarkable for leukocytosis, she has an elevated lactic acidosis. Her CT scan shows infectious/inflammatory enteritis. There is partial obstruction likely from the surrounding edema. I discussed all of our findings with the patient. Upon reassessment she has improvement of her symptoms. I would like to admit her to the hospital for further care to which she is agreeable. Consults placed to the hospitalist and general surgery. General surgeon has agreed to follow-up with the patient and the hospitalist has agreed to admit this patient to his service. Patient has no acute decompensation here in the emergency room. Is this patient to be included in the SEP-1 Core Measure due to severe sepsis or septic shock? No   Exclusion criteria - the patient is NOT to be included for SEP-1 Core Measure due to:  May have criteria for sepsis, but does not meet criteria for severe sepsis or septic shock     CRITICAL CARE TIME   Total Critical Care time was 30 minutes, excluding separately reportable procedures. There was a high probability of clinically significant/life threatening deterioration in the patient's condition which required my urgent intervention. CONSULTS:  IP CONSULT TO HOSPITALIST  IP CONSULT TO GENERAL SURGERY    PROCEDURES:       Procedures    FINAL IMPRESSION      1. Enteritis          DISPOSITION/PLAN   DISPOSITION Admitted 10/06/2022 05:59:29 AM      PATIENT REFERREDTO:  No follow-up provider specified.     DISCHARGEMEDICATIONS:  New Prescriptions    No medications on file          (Please note that portions of this note were completed with a voice recognition program.  Efforts were made to edit the dictations but occasionally words are mis-transcribed.)    Amparo Lazar MD (electronically signed)  Attending Emergency Physician  I am the primary physician of record Melvina Nath MD  10/06/22 5234

## 2022-10-06 NOTE — CONSULTS
Department of General Surgery Consult    PATIENT NAME: Carmine Cordero OF BIRTH: 1932    ADMISSION DATE: 10/6/2022  2:34 AM      TODAY'S DATE: 10/6/2022    Reason for Consult:  enteritis, abd pain    Chief Complaint: lower abd pain    Requesting Physician:  João Gallegos    HISTORY OF PRESENT ILLNESS:              The patient is a 80 y.o. female who presented yesterday with lower abdominal pain and nausea. She reports that is has been ongoing for about 3 weeks and at times becomes more severe. Her family MD prescribed Pepcid last week and apparently Bentyl which she never got filled. She denies diarrhea or constipation, denies vomiting, but does endorse having nausea. She denies fever or chills. Currently she reports her pain is much improved and she is asking if she can go home tomorrow.      Past Medical History:        Diagnosis Date    Arthritis     ASHD (arteriosclerotic heart disease)     s/p 5 stents placed     Breast cancer ([address]     left s/p lumpectomy and radiation    Hyperlipidemia     Hypertension        Past Surgical History:        Procedure Laterality Date    BREAST LUMPECTOMY  2004    left     SECTION      CORONARY ANGIOPLASTY WITH STENT PLACEMENT  2009    5 drug-eluting    DILATION AND CURETTAGE OF UTERUS  2017    hysteroscopy, myosure    EYE SURGERY Right 2013    cataract w/ IOL    EYE SURGERY Left 13    cataract with IOL implant    TONSILLECTOMY         Current Medications:   Current Facility-Administered Medications: amLODIPine (NORVASC) tablet 2.5 mg, 2.5 mg, Oral, Daily  aspirin EC tablet 81 mg, 81 mg, Oral, Daily  dicyclomine (BENTYL) capsule 10 mg, 10 mg, Oral, 4x Daily  famotidine (PEPCID) tablet 20 mg, 20 mg, Oral, Daily  isosorbide mononitrate (IMDUR) extended release tablet 15 mg, 15 mg, Oral, Daily  metoprolol tartrate (LOPRESSOR) tablet 75 mg, 75 mg, Oral, BID  rosuvastatin (CRESTOR) tablet 10 mg, 10 mg, Oral, Daily  sodium chloride flush 0.9 % injection 5-40 mL, 5-40 mL, IntraVENous, 2 times per day  sodium chloride flush 0.9 % injection 5-40 mL, 5-40 mL, IntraVENous, PRN  0.9 % sodium chloride infusion, , IntraVENous, PRN  enoxaparin (LOVENOX) injection 40 mg, 40 mg, SubCUTAneous, Daily  polyethylene glycol (GLYCOLAX) packet 17 g, 17 g, Oral, Daily PRN  acetaminophen (TYLENOL) tablet 650 mg, 650 mg, Oral, Q6H PRN **OR** acetaminophen (TYLENOL) suppository 650 mg, 650 mg, Rectal, Q6H PRN  0.9 % sodium chloride infusion, , IntraVENous, Continuous  piperacillin-tazobactam (ZOSYN) 3,375 mg in dextrose 5 % 50 mL IVPB extended infusion (mini-bag), 3,375 mg, IntraVENous, Q8H  prochlorperazine (COMPAZINE) injection 10 mg, 10 mg, IntraVENous, Q6H PRN  morphine (PF) injection 2 mg, 2 mg, IntraVENous, Q2H PRN **OR** morphine sulfate (PF) injection 4 mg, 4 mg, IntraVENous, Q2H PRN  Prior to Admission medications    Medication Sig Start Date End Date Taking? Authorizing Provider   famotidine (PEPCID) 20 MG tablet Take 1 tablet by mouth daily  Patient not taking: Reported on 10/6/2022 9/28/22   Linna Closs, MD   dicyclomine (BENTYL) 10 MG capsule Take 1 capsule by mouth 4 times daily  Patient not taking: Reported on 10/6/2022 9/28/22   Linna Closs, MD   ipratropium (ATROVENT) 0.03 % nasal spray 2 sprays by Each Nostril route every 12 hours  Patient not taking: Reported on 10/6/2022 7/20/21   Linna Closs, MD   isosorbide mononitrate (IMDUR) 30 MG extended release tablet Take 15 mg by mouth  3/20/18   Historical Provider, MD   aspirin 81 MG tablet Take 81 mg by mouth daily    Historical Provider, MD   amLODIPine (NORVASC) 2.5 MG tablet Take 2.5 mg by mouth daily. Historical Provider, MD   metoprolol (LOPRESSOR) 50 MG tablet Take 50 mg by mouth 2 times daily. Historical Provider, MD   rosuvastatin (CRESTOR) 5 MG tablet Take 1 tablet by mouth daily.  9/24/10   REBEKA Ferrari        Allergies:  Carbocaine [mepivacaine hcl], Ciprofloxacin, Metronidazole, Prevnar 13 [pneumococcal 13-christina conj vacc], and Zofran [ondansetron hcl]    Social History:   TOBACCO:   reports that she has never smoked. She has never used smokeless tobacco.  ETOH:   reports no history of alcohol use. Family History:        Problem Relation Age of Onset    Cancer Mother 80        colon    Diabetes Brother     Heart Disease Brother     High Blood Pressure Brother        REVIEW OF SYSTEMS:  CONSTITUTIONAL:  negative  HEENT:  negative  RESPIRATORY:  negative  CARDIOVASCULAR:  negative  GASTROINTESTINAL:  negative except for nausea and abdominal pain  GENITOURINARY:  negative  HEMATOLOGIC/LYMPHATIC:  negative  NEUROLOGICAL:  Negative  * All other ROS reviewed and negative. PHYSICAL EXAM:  VITALS:  BP (!) 156/80   Pulse 66   Temp 97.5 °F (36.4 °C) (Oral)   Resp 18   Ht 5' 5\" (1.651 m)   Wt 148 lb (67.1 kg)   SpO2 94%   BMI 24.63 kg/m²   24HR INTAKE/OUTPUT:    I/O last 3 completed shifts:  In: -   Out: 400 [Urine:400]  No intake/output data recorded.       CONSTITUTIONAL:  alert, no apparent distress and normal weight  EYES:  PERRL, sclera clear  ENT:  Normocephalic,atraumatic, without obvious abnormality  NECK:  supple, symmetrical, trachea midline  LUNGS: Resp effort easy and unlabored, no crackles or wheezing  CARDIOVASCULAR:  NO JVD, regular rate and rhythm   ABDOMEN:   hypoactive bowel sounds, soft, non-distended, non-tender,   MUSCULOSKELETAL: No clubbing or cyanosis, 0+ pitting edema lower extremities  NEUROLOGIC:  Mental Status Exam:  Level of Alertness:   awake  PSYCHIATRIC:   person, place, time  SKIN:  normal skin color, texture, turgor    DATA:    CBC:   Recent Labs     10/06/22  0252   WBC 12.3*   HGB 17.1*   HCT 51.9*        BMP:    Recent Labs     10/06/22  0252      K 4.4      CO2 27   BUN 14   CREATININE 0.9   GLUCOSE 119*     Hepatic:   Recent Labs     10/06/22  0252   AST 24   ALT 14   BILITOT 0.7   ALKPHOS 76     Mag:    No results for input(s): MG in the last 72 hours. Phos:   No results for input(s): PHOS in the last 72 hours. INR: No results for input(s): INR in the last 72 hours. Radiology Review: Images personally reviewed by me. EXAMINATION:   CT OF THE ABDOMEN AND PELVIS WITH CONTRAST 10/6/2022 4:02 am     TECHNIQUE:   CT of the abdomen and pelvis was performed with the administration of   intravenous contrast. Multiplanar reformatted images are provided for review. Automated exposure control, iterative reconstruction, and/or weight based   adjustment of the mA/kV was utilized to reduce the radiation dose to as low   as reasonably achievable. COMPARISON:   04/06/2022     HISTORY:   ORDERING SYSTEM PROVIDED HISTORY: Bilateral lower quadrant abdominal pain   TECHNOLOGIST PROVIDED HISTORY:   Additional Contrast?->None   Reason for exam:->Bilateral lower quadrant abdominal pain   Decision Support Exception - unselect if not a suspected or confirmed   emergency medical condition->Emergency Medical Condition (MA)   Reason for Exam: Bilateral lower quadrant abdominal pain     FINDINGS:   Lower Chest: Basilar chronic scarring or atelectasis. No acute focal   infiltrate. No thoracic aortic aneurysm or dissection. Coronary artery   atherosclerosis. No cardiomegaly. No distal esophageal thickening is   identified. Small hiatal hernia. Organs: No enhancing mass identified within the liver or spleen. Calcified   granulomas are seen in the liver. Trace free fluid seen adjacent to the   liver. Gallbladder appears grossly unremarkable. No adrenal mass. No   pancreatic mass. No peripancreatic inflammatory process or ductal dilation. No suspicious renal mass. No urinary tract calculi or obstruction. GI/Bowel: Circumferential wall thickening involving loops of ileum in the   right abdomen, with multiple proximal fluid-filled loops of small bowel with   mild perienteric edema. No appendicitis.  No acute colonic abnormality is   identified. Moderate gastric distention. Pelvis:  Bladder is unremarkable. Uterus and adnexal structures are   unremarkable. No bulky pelvic lymphadenopathy. Peritoneum/Retroperitoneum: Aorto iliac atherosclerosis. No abdominal aortic   aneurysm. No dissection. No retroperitoneal or mesenteric lymphadenopathy. Bones/Soft Tissues: No acute subcutaneous soft tissue abnormality is   identified. No acute osseous abnormality. S shaped scoliotic deformity. Multilevel degenerative changes seen throughout the lower thoracic and lumbar   spine. Greatest degree of degenerative change is at L4-L5 and L5-S1 with   disc space disease as well as facet arthropathy, osseous foraminal and spinal   canal stenosis. Findings in the spine appear similar to the previous exam.    Impression:     1. Acute inflammatory or infectious enteritis involving the ileum, with   partial obstruction and reactive mesenteric edema and small volume ascites. 2. Diverticulosis without acute diverticulitis. 3. Atherosclerotic disease including coronary artery involvement. 4. Small hiatal hernia. Moderate gastric distention without obstructive mass         IMPRESSION/RECOMMENDATIONS:    Findings consistent with enteritis involving the ileum of likely either inflammatory or infectious in nature. She feels better having had hydration. Doubtful that acute surgical intervention will be required given improvement of sx and may need GI consult for their input. Will discuss further with Dr. Martinez King    Electronically signed by MAIKOL Rojas - [facility]  [facility]    Surgery Staff    I have examined this patient, and read and agree with the note by Titus Urban CNP from today; more than half of the total time was spent by me on the encounter. Pt has new onset abdominal pain, nausea over past few days. Evaluation in ED suggested enteritis w/ possible associated partial SBO.   Today she reports feeling better, no current pain. No surgical intervention likely needed. Monitor for continued improvement in symptoms; if she does, should be able to advance diet and d/c home soon. If patient fails to improve, may need repeat imaging to reassess, and may benefit from GI evaluation for inflammatory bowel process.     Bridger Montes MD

## 2022-10-07 LAB
A/G RATIO: 1.4 (ref 1.1–2.2)
ALBUMIN SERPL-MCNC: 3.4 G/DL (ref 3.4–5)
ALP BLD-CCNC: 57 U/L (ref 40–129)
ALT SERPL-CCNC: 10 U/L (ref 10–40)
ANION GAP SERPL CALCULATED.3IONS-SCNC: 6 MMOL/L (ref 3–16)
AST SERPL-CCNC: 20 U/L (ref 15–37)
BILIRUB SERPL-MCNC: 0.8 MG/DL (ref 0–1)
BUN BLDV-MCNC: 15 MG/DL (ref 7–20)
CALCIUM SERPL-MCNC: 10 MG/DL (ref 8.3–10.6)
CHLORIDE BLD-SCNC: 111 MMOL/L (ref 99–110)
CO2: 26 MMOL/L (ref 21–32)
CREAT SERPL-MCNC: 0.8 MG/DL (ref 0.6–1.2)
GFR AFRICAN AMERICAN: >60
GFR NON-AFRICAN AMERICAN: >60
GLUCOSE BLD-MCNC: 87 MG/DL (ref 70–99)
HCT VFR BLD CALC: 42 % (ref 36–48)
HEMOGLOBIN: 14 G/DL (ref 12–16)
MAGNESIUM: 2.4 MG/DL (ref 1.8–2.4)
MCH RBC QN AUTO: 29.6 PG (ref 26–34)
MCHC RBC AUTO-ENTMCNC: 33.2 G/DL (ref 31–36)
MCV RBC AUTO: 88.9 FL (ref 80–100)
PDW BLD-RTO: 14 % (ref 12.4–15.4)
PHOSPHORUS: 2.4 MG/DL (ref 2.5–4.9)
PLATELET # BLD: 131 K/UL (ref 135–450)
PMV BLD AUTO: 10.4 FL (ref 5–10.5)
POTASSIUM REFLEX MAGNESIUM: 4.1 MMOL/L (ref 3.5–5.1)
RBC # BLD: 4.72 M/UL (ref 4–5.2)
SODIUM BLD-SCNC: 143 MMOL/L (ref 136–145)
TOTAL PROTEIN: 5.8 G/DL (ref 6.4–8.2)
WBC # BLD: 6.3 K/UL (ref 4–11)

## 2022-10-07 PROCEDURE — 36415 COLL VENOUS BLD VENIPUNCTURE: CPT

## 2022-10-07 PROCEDURE — 97116 GAIT TRAINING THERAPY: CPT

## 2022-10-07 PROCEDURE — 1200000000 HC SEMI PRIVATE

## 2022-10-07 PROCEDURE — 85027 COMPLETE CBC AUTOMATED: CPT

## 2022-10-07 PROCEDURE — 97165 OT EVAL LOW COMPLEX 30 MIN: CPT

## 2022-10-07 PROCEDURE — 80053 COMPREHEN METABOLIC PANEL: CPT

## 2022-10-07 PROCEDURE — 6370000000 HC RX 637 (ALT 250 FOR IP): Performed by: INTERNAL MEDICINE

## 2022-10-07 PROCEDURE — 97530 THERAPEUTIC ACTIVITIES: CPT

## 2022-10-07 PROCEDURE — 2580000003 HC RX 258: Performed by: INTERNAL MEDICINE

## 2022-10-07 PROCEDURE — 97162 PT EVAL MOD COMPLEX 30 MIN: CPT

## 2022-10-07 PROCEDURE — 84100 ASSAY OF PHOSPHORUS: CPT

## 2022-10-07 PROCEDURE — 6360000002 HC RX W HCPCS: Performed by: INTERNAL MEDICINE

## 2022-10-07 PROCEDURE — 99232 SBSQ HOSP IP/OBS MODERATE 35: CPT | Performed by: SURGERY

## 2022-10-07 PROCEDURE — 83735 ASSAY OF MAGNESIUM: CPT

## 2022-10-07 RX ORDER — AMOXICILLIN AND CLAVULANATE POTASSIUM 875; 125 MG/1; MG/1
1 TABLET, FILM COATED ORAL 2 TIMES DAILY
Status: DISCONTINUED | OUTPATIENT
Start: 2022-10-08 | End: 2022-10-09 | Stop reason: HOSPADM

## 2022-10-07 RX ORDER — AMOXICILLIN AND CLAVULANATE POTASSIUM 875; 125 MG/1; MG/1
1 TABLET, FILM COATED ORAL ONCE
Status: COMPLETED | OUTPATIENT
Start: 2022-10-07 | End: 2022-10-07

## 2022-10-07 RX ADMIN — DICYCLOMINE HYDROCHLORIDE 10 MG: 10 CAPSULE ORAL at 17:30

## 2022-10-07 RX ADMIN — ROSUVASTATIN CALCIUM 10 MG: 10 TABLET, FILM COATED ORAL at 09:25

## 2022-10-07 RX ADMIN — ASPIRIN 81 MG: 81 TABLET, COATED ORAL at 09:25

## 2022-10-07 RX ADMIN — METOPROLOL TARTRATE 75 MG: 25 TABLET, FILM COATED ORAL at 10:06

## 2022-10-07 RX ADMIN — DICYCLOMINE HYDROCHLORIDE 10 MG: 10 CAPSULE ORAL at 20:12

## 2022-10-07 RX ADMIN — METOPROLOL TARTRATE 75 MG: 25 TABLET, FILM COATED ORAL at 20:12

## 2022-10-07 RX ADMIN — SODIUM CHLORIDE: 9 INJECTION, SOLUTION INTRAVENOUS at 10:07

## 2022-10-07 RX ADMIN — FAMOTIDINE 20 MG: 20 TABLET, FILM COATED ORAL at 09:26

## 2022-10-07 RX ADMIN — AMOXICILLIN AND CLAVULANATE POTASSIUM 1 TABLET: 875; 125 TABLET, FILM COATED ORAL at 15:33

## 2022-10-07 RX ADMIN — DICYCLOMINE HYDROCHLORIDE 10 MG: 10 CAPSULE ORAL at 12:48

## 2022-10-07 RX ADMIN — ENOXAPARIN SODIUM 40 MG: 100 INJECTION SUBCUTANEOUS at 10:06

## 2022-10-07 RX ADMIN — PIPERACILLIN AND TAZOBACTAM 3375 MG: 3; .375 INJECTION, POWDER, LYOPHILIZED, FOR SOLUTION INTRAVENOUS at 11:43

## 2022-10-07 RX ADMIN — AMLODIPINE BESYLATE 2.5 MG: 2.5 TABLET ORAL at 10:06

## 2022-10-07 RX ADMIN — ISOSORBIDE MONONITRATE 15 MG: 30 TABLET, EXTENDED RELEASE ORAL at 09:25

## 2022-10-07 RX ADMIN — DICYCLOMINE HYDROCHLORIDE 10 MG: 10 CAPSULE ORAL at 09:26

## 2022-10-07 RX ADMIN — PIPERACILLIN AND TAZOBACTAM 3375 MG: 3; .375 INJECTION, POWDER, LYOPHILIZED, FOR SOLUTION INTRAVENOUS at 03:58

## 2022-10-07 ASSESSMENT — PAIN SCALES - GENERAL: PAINLEVEL_OUTOF10: 0

## 2022-10-07 NOTE — PROGRESS NOTES
[facility]    PATIENT NAME: Shawn Caceres     TODAY'S DATE: 10/7/2022    CHIEF COMPLAINT: none    INTERVAL HISTORY/HPI:    Pt without pain or nausea, no fevers or chills. OBJECTIVE:  VITALS:  BP (!) 160/68   Pulse 60   Temp 97.6 °F (36.4 °C) (Oral)   Resp 16   Ht 5' 5\" (1.651 m)   Wt 148 lb (67.1 kg)   SpO2 96%   BMI 24.63 kg/m²     INTAKE/OUTPUT:    I/O last 3 completed shifts:  In: -   Out: 400 [Urine:400]  I/O this shift:  In: 360 [P.O.:360]  Out: 1100 [Urine:1100]    CONSTITUTIONAL:  awake and alert  LUNGS:  no crackles or wheezing  ABDOMEN:  normal bowel sounds, soft, non-distended, non-tender     Data:  CBC:   Recent Labs     10/06/22  0252 10/07/22  0718   WBC 12.3* 6.3   HGB 17.1* 14.0   HCT 51.9* 42.0    131*     BMP:    Recent Labs     10/06/22  0252 10/07/22  0718    143   K 4.4 4.1    111*   CO2 27 26   BUN 14 15   CREATININE 0.9 0.8   GLUCOSE 119* 87     Hepatic:   Recent Labs     10/06/22  0252 10/07/22  0718   AST 24 20   ALT 14 10   BILITOT 0.7 0.8   ALKPHOS 76 57     Mag:      Recent Labs     10/07/22  0718   MG 2.40      Phos:     Recent Labs     10/07/22  0718   PHOS 2.4*      INR: No results for input(s): INR in the last 72 hours.     Radiology Review:  NA      ASSESSMENT AND PLAN:  79 yo with enteritis, psbo  No pain or nausea  Advance diet  Continue abx    Electronically signed by Estuardo Rossi MD

## 2022-10-07 NOTE — PROGRESS NOTES
Physician Progress Note      PATIENT:               Yuki Arana  CSN #:                  [id number]  :                       1932  ADMIT DATE:       10/6/2022 2:34 AM  DISCH DATE:  RESPONDING  PROVIDER #:        Olga Turner MD          QUERY TEXT:    Pt admitted with abdominal pain and has enteritis documented. If possible,   please document the type of colitis in the medical record. The medical record reflects the following:  Risk Factors: diverticulosis, advanced age, chronic conditions, possible   partial SBO  Clinical Indicators: severe abdominal pain, nausea, \"Acute inflammatory or   infectious enteritis involving the ileum, with  partial obstruction and reactive mesenteric edema and small volume ascites\"   per CT  Treatment: IV Zosyn, IVF, serial labs, advance diet as tolerates, GI and   General Surgery consult, supportive care    Thank you,  Selina Sepulveda RN, [email or web address]  Options provided:  -- Bacterial Enteritis  -- Colitis due to other etiology, Please document other etiology. -- Other - I will add my own diagnosis  -- Disagree - Not applicable / Not valid  -- Disagree - Clinically unable to determine / Unknown  -- Refer to Clinical Documentation Reviewer    PROVIDER RESPONSE TEXT:    This patient has bacterial enteritis.     Query created by: Venancio Esqueda on 10/7/2022 2:21 PM      Electronically signed by:  Olga Turner MD 10/7/2022 3:00 PM

## 2022-10-07 NOTE — PROGRESS NOTES
Hospitalist Progress Note      PCP: Arben Mitchell MD    Date of Admission: 10/6/2022    Chief Complaint: Abdominal Pain w/ Nausea    Subjective: No new c/o. Medications:  Reviewed    Infusion Medications    sodium chloride      sodium chloride 75 mL/hr at 10/06/22 0742     Scheduled Medications    amLODIPine  2.5 mg Oral Daily    aspirin  81 mg Oral Daily    dicyclomine  10 mg Oral 4x Daily    famotidine  20 mg Oral Daily    isosorbide mononitrate  15 mg Oral Daily    metoprolol tartrate  75 mg Oral BID    rosuvastatin  10 mg Oral Daily    sodium chloride flush  5-40 mL IntraVENous 2 times per day    enoxaparin  40 mg SubCUTAneous Daily    piperacillin-tazobactam  3,375 mg IntraVENous Q8H     PRN Meds: sodium chloride flush, sodium chloride, polyethylene glycol, acetaminophen **OR** acetaminophen, prochlorperazine, morphine **OR** morphine      Intake/Output Summary (Last 24 hours) at 10/7/2022 0916  Last data filed at 10/7/2022 0720  Gross per 24 hour   Intake --   Output 700 ml   Net -700 ml       Physical Exam Performed:    BP (!) 160/68   Pulse 60   Temp 97.6 °F (36.4 °C) (Oral)   Resp 16   Ht 5' 5\" (1.651 m)   Wt 148 lb (67.1 kg)   SpO2 96%   BMI 24.63 kg/m²     General appearance: No apparent distress, appears stated age and cooperative. HEENT: Pupils equal, round, and reactive to light. Conjunctivae/corneas clear. Neck: Supple, with full range of motion. No jugular venous distention. Trachea midline. Respiratory:  Normal respiratory effort. Clear to auscultation, bilaterally without Rales/Wheezes/Rhonchi. Cardiovascular: Regular rate and rhythm with normal S1/S2 without murmurs, rubs or gallops. Abdomen: Soft, non-tender, non-distended with normal bowel sounds. Musculoskeletal: No clubbing, cyanosis or edema bilaterally. Full range of motion without deformity. Skin: Skin color, texture, turgor normal.  No rashes or lesions.   Neurologic:  Neurovascularly intact without any focal sensory/motor deficits. Cranial nerves: II-XII intact, grossly non-focal.  Psychiatric: Alert and oriented, thought content appropriate, normal insight  Capillary Refill: Brisk,< 3 seconds   Peripheral Pulses: +2 palpable, equal bilaterally       Labs:   Recent Labs     10/06/22  0252 10/07/22  0718   WBC 12.3* 6.3   HGB 17.1* 14.0   HCT 51.9* 42.0    131*     Recent Labs     10/06/22  0252 10/07/22  0718    143   K 4.4 4.1    111*   CO2 27 26   BUN 14 15   CREATININE 0.9 0.8   CALCIUM 11.3* 10.0   PHOS  --  2.4*     Recent Labs     10/06/22  0252 10/07/22  0718   AST 24 20   ALT 14 10   BILITOT 0.7 0.8   ALKPHOS 76 57     No results for input(s): INR in the last 72 hours. No results for input(s): Guy Lizz in the last 72 hours. Urinalysis:      Lab Results   Component Value Date/Time    NITRU Negative 10/06/2022 05:07 AM    WBCUA 3-5 03/31/2022 06:20 PM    RBCUA None seen 03/31/2022 06:20 PM    BLOODU Negative 10/06/2022 05:07 AM    SPECGRAV <=1.005 10/06/2022 05:07 AM    GLUCOSEU Negative 10/06/2022 05:07 AM       Consults:    IP CONSULT TO HOSPITALIST  IP CONSULT TO GENERAL SURGERY      Assessment/Plan:    Active Hospital Problems    Diagnosis     Abdominal pain [R10.9]      Priority: Medium    Enteritis [K52.9]      Priority: Medium         Enteritis - as seen on admission CT abdomen - independently reviewed. General Surgery consulted and appreciated. Diet advanced to clears. HTN - w/out known CAD and no evidence of active signs/sxs of ischemia/failure. Currently controlled on home meds w/ vitals reviewed and documented as above. HyperLipidemia - controlled on home Statin. Continue, w/ f/u and med adjustment w/ PCP       DVT Prophylaxis: LMWH     Recent Labs     10/06/22  0252 10/07/22  0718    131*     Diet: ADULT DIET;  Clear Liquid  Code Status: Full Code      PT/OT Eval Status: ordered and pending    Dispo - Patient is likely to remain in-house at least until Sat/Sunday 8/9 October pending clinical course, subspecialty recs and  PT/OT eval/recs. Daughter Soto Lundberg present at bedside when seen 6 October.      Javier Allison MD

## 2022-10-07 NOTE — PROGRESS NOTES
Physical Therapy  Facility/Department: [place] [id number] - MED SURG  Physical Therapy Initial Assessment    Name: Briana Parkinson  : 1932  MRN: [id number]  Date of Service: 10/7/2022    Discharge Recommendations:  Home with assist PRN          Patient Diagnosis(es): The encounter diagnosis was Enteritis. Past Medical History:  has a past medical history of Arthritis, ASHD (arteriosclerotic heart disease), Breast cancer ([address] Hyperlipidemia, and Hypertension. Past Surgical History:  has a past surgical history that includes Coronary angioplasty with stent (2009);  section; Breast lumpectomy (); Tonsillectomy; eye surgery (Right, 2013); eye surgery (Left, 13); and Dilation and curettage of uterus (2017). Assessment   Assessment: Pt is 81 y/o F who presents to [facility] w/ c/o R and LLQ abdominal pain, Dx: Enteritis. Per pt, PLOF: indep w/o use of AD and 1 CARLOS, lives alone. Pt presents at baseline this date for mobility and has met all PT goals for acute care. PT eval only, please re order if status changes.  Pt will benefit from home w/ prn assist.  Therapy Prognosis: Excellent  Decision Making: Low Complexity  Requires PT Follow-Up: No  Activity Tolerance  Activity Tolerance: Patient tolerated evaluation without incident     Plan   Physcial Therapy Plan  General Plan: Discharge with evaluation only  Safety Devices  Type of Devices: Gait belt, Nurse notified, Left in bed (pt sitting EOB)     Restrictions  Restrictions/Precautions  Restrictions/Precautions: General Precautions, Up as Tolerated  Position Activity Restriction  Other position/activity restrictions: L IV     Subjective   Pain: Pt denies pain this date  General  Chart Reviewed: Yes  Patient assessed for rehabilitation services?: Yes  Response To Previous Treatment: Not applicable  Diagnosis: Enteritis  Follows Commands: Within Functional Limits  General Comment  Comments: Pt cleared for PT eval  Subjective  Subjective: Pt agreeable to PT eval         Social/Functional History  Social/Functional History  Lives With: Alone  Type of Home: House  Home Layout: One level  Home Access: Stairs to enter without rails  Entrance Stairs - Number of Steps: 1  Bathroom Shower/Tub: Walk-in shower  Bathroom Toilet: Handicap height  Bathroom Equipment:  (no DME)  Home Equipment: Cane (Pt reports that she has cane at home but does not use it)  Has the patient had two or more falls in the past year or any fall with injury in the past year?: No  ADL Assistance: Independent  Homemaking Assistance: Independent  Homemaking Responsibilities: Yes  Ambulation Assistance: Independent  Transfer Assistance: Independent  Active : Yes  Occupation: Retired  Type of Occupation: [employer]  Leisure & Hobbies: go to the grocery store, cut the grass, watch TV, play with phone  Vision/Hearing  Vision  Vision: Impaired  Vision Exceptions: Wears glasses at all times; Wears glasses for reading  Hearing  Hearing: Exceptions to [facility] [address] [facility]  Hearing Exceptions: Hard of hearing/hearing concerns    Cognition   Orientation  Overall Orientation Status: Within Functional Limits  Orientation Level: Oriented to place;Oriented to time;Oriented to situation;Oriented to person  Cognition  Overall Cognitive Status: WFL     Objective   Heart Rate: 60  Heart Rate Source: Monitor  BP: (!) 160/68  BP Location: Right upper arm  BP Method: Automatic  Patient Position: Semi fowlers  MAP (Calculated): 98.67  Resp: 16  SpO2: 96 %  O2 Device: None (Room air)     Observation/Palpation  Posture: Good  Gross Assessment  AROM: Within functional limits  PROM: Within functional limits  Strength:  Within functional limits  Coordination: Within functional limits  Tone: Normal  Sensation: Intact                 Bed Mobility Training  Bed Mobility Training: Yes  Supine to Sit: Modified independent  Sit to Supine: Modified independent  Scooting: Modified independent  Balance  Sitting: Intact  Standing: Intact  Transfer Training  Transfer Training: Yes  Sit to Stand: Modified independent  Stand to Sit: Modified independent  Toilet Transfer: Supervision (no grab bar usage)  Gait Training  Gait Training: Yes  Gait  Overall Level of Assistance: Modified independent  Speed/Missy: Slow  Distance (ft): 200 Feet  Assistive Device: Gait belt  Rail Use: None  Stairs - Level of Assistance: Stand-by assistance  Number of Stairs Trained: 2      AM-PAC Score  AM-PAC Inpatient Mobility Raw Score : 24 (10/07/22 1302)  AM-PAC Inpatient T-Scale Score : 61.14 (10/07/22 1302)  Mobility Inpatient CMS 0-100% Score: 0 (10/07/22 1302)  Mobility Inpatient CMS G-Code Modifier : [address] (10/07/22 1302)    Goals  Short Term Goals  Time Frame for Short Term Goals: 1 week, 10/14/22  Short Term Goal 1: Pt will perform bed mobility w/ Sup: goal met 10/7  Short Term Goal 2: Pt will perform sit to stand w/ Sup: goal met 10/7  Short Term Goal 3: Pt will perform 50 ft of amb w/o AD and Sup: goal met 10/7  Short Term Goal 4: Pt will navigate 2 steps w/o AD and Sup: goal met 10/7  Patient Goals   Patient Goals : \"to be able to walk around the room\" goal met 10/7       Education  Patient Education  Education Given To: Patient  Education Provided: Role of Therapy;Plan of Care  Education Provided Comments: Pt educated on role of PT for current deficits  Education Method: Verbal  Barriers to Learning: None  Education Outcome: Verbalized understanding;Demonstrated understanding      Therapy Time   Individual Concurrent Group Co-treatment   Time In 1230         Time Out 1255         Minutes 25         Timed Code Treatment Minutes: 15 Minutes plus 10 min patrice Pham, PT   If pt is unable to be seen after this session, please let this note serve as discharge summary. Please see case management note for discharge disposition. Thank you.

## 2022-10-07 NOTE — PROGRESS NOTES
Pt reports cramping, bloating and discomfort with full liquid diet. Pt denies nausea or vomiting. Last BM 10/7/2022 am. Bowel sounds active x4. Will continue to monitor.

## 2022-10-07 NOTE — PROGRESS NOTES
Pt awake in bed. A&Ox4. BP elevated. See MAR. All other vitals signs stable. Pt denies pain at this time. Safety precautions in place. Bed locked and in lowest position. Call light and bedside table with reach. Pt is independent and calls out appropriately. Will continue to monitor.

## 2022-10-07 NOTE — PROGRESS NOTES
Occupational Therapy  Facility/Department: [place] [address] - MED SURG  Occupational Therapy Initial Assessment and Treatment x1    Name: Melissa Rico  : 1932  MRN: [id number]  Date of Service: 10/7/2022    Discharge Recommendations:  Home with assist PRN  OT Equipment Recommendations  Equipment Needed: Yes  Mobility Devices: ADL Assistive Devices  ADL Assistive Devices: Shower Chair with back       Patient Diagnosis(es): The encounter diagnosis was Enteritis. Past Medical History:  has a past medical history of Arthritis, ASHD (arteriosclerotic heart disease), Breast cancer ([facility] [address] Hyperlipidemia, and Hypertension. Past Surgical History:  has a past surgical history that includes Coronary angioplasty with stent (2009);  section; Breast lumpectomy (); Tonsillectomy; eye surgery (Right, 2013); eye surgery (Left, 13); and Dilation and curettage of uterus (2017). Assessment   Performance deficits / Impairments: Decreased strength  Assessment: Melissa Rico is a(n) 80 y.o. female admitted to [facility] with c/o R and L LQ abdominal pain, Dx enteritis. PLOF: independent at home alone with no AD, very active, driving  Pt required:   Functional Mobility: supervision for transfers and ambulation of household distance in adames with no AD  ADL Training: supervision for LB dressing  Pt required minimal verbal cues for safety, including safety during small turns. Pt is functioning below at baseline, and does not warrant further OT services while admitted, should pt's functional status change, please re order. Once medically appropriate, rec Home with PRN Assist  upon d/c. Decision Making: Low Complexity  No Skilled OT: Independent with ADL's;At baseline function; Safe to return home  REQUIRES OT FOLLOW-UP: No  Activity Tolerance  Activity Tolerance: Patient Tolerated treatment well        Plan   Occupational Therapy Plan  Times Per Week: one time eval and d/c Restrictions  Restrictions/Precautions  Restrictions/Precautions: General Precautions, Up as Tolerated  Position Activity Restriction  Other position/activity restrictions: L IV    Subjective   General  Chart Reviewed: Yes  Patient assessed for rehabilitation services?: Yes  Family / Caregiver Present: No  Referring Practitioner: John Alvarado MD  Diagnosis: abdominal pain  Subjective  Subjective: pt pleasant and agreeable at OT arrival; reporting no pain at rest; Rn approved       Social/Functional History  Social/Functional History  Lives With: Alone  Type of Home: House  Home Layout: One level  Home Access: Stairs to enter without rails  Entrance Stairs - Number of Steps: 1  Bathroom Shower/Tub: Walk-in shower  Bathroom Toilet: Handicap height (2 toilets- one high, one standard)  Bathroom Equipment:  (no DME)  Home Equipment: Cane  Has the patient had two or more falls in the past year or any fall with injury in the past year?: No  ADL Assistance: Independent  Homemaking Assistance: Independent  Homemaking Responsibilities: Yes  Ambulation Assistance: Independent  Transfer Assistance: Independent  Active : Yes  Occupation: Retired  Type of Occupation: [address] [place] [address] Avenue: go to [employer] store, cut the grass, watch TV, play with phone       Objective   Heart Rate: 60  Heart Rate Source: Monitor  BP: (!) 160/68  BP Location: Right upper arm  BP Method: Automatic  Patient Position: Semi fowlers  MAP (Calculated): 98.67  Resp: 16  SpO2: 96 %          Observation/Palpation  Posture: Good  Safety Devices  Type of Devices: Gait belt;Left in chair;Nurse notified (RN ok'ed no alarm, no bed alarm set)  Bed Mobility Training  Bed Mobility Training: Yes  Supine to Sit: Modified independent (HOB elevated)  Scooting: Supervision  Transfer Training  Transfer Training: Yes  Sit to Stand: Supervision  Stand to Sit: Supervision  Toilet Transfer: Supervision (no grab bar usage)     AROM: Generally decreased, functional  Strength: Generally decreased, functional  Coordination: Generally decreased, functional  Tone: Normal  Sensation: Intact  ADL  LE Dressing: Supervision  LE Dressing Skilled Clinical Factors: donning shoes EOB              Vision  Vision: Impaired  Vision Exceptions: Wears glasses at all times; Wears glasses for reading  Hearing  Hearing: Exceptions to [facility]  Hearing Exceptions: Hard of hearing/hearing concerns  Cognition  Overall Cognitive Status: WFL  Orientation  Overall Orientation Status: Within Functional Limits  Orientation Level: Oriented to place;Oriented to time;Oriented to situation;Oriented to person  Perception  Overall Perceptual Status: [facility]            Dynamic Standing Balance Exercises: household ambulation in hallway with supervision, one small stumble over shoes with small R hand turn; pt self corrected  Education Given To: Patient  Education Provided: Role of Therapy;Plan of Care;Precautions  Education Provided Comments: disease specific: benefits of up to chair for meals  Education Method: Verbal  Barriers to Learning: None  Education Outcome: Verbalized understanding                        AM-PAC Score        AM-[facility] Inpatient Daily Activity Raw Score: 23 (10/07/22 1144)  AM-PAC Inpatient ADL T-Scale Score : 51.12 (10/07/22 1144)  ADL Inpatient CMS 0-100% Score: 15.86 (10/07/22 1144)  ADL Inpatient CMS G-Code Modifier : CI (10/07/22 [facility]4)    Goals  Short Term Goals  Time Frame for Short Term Goals: within one OT session  Short Term Goal 1: pt will perform LB dressing with supervision -GOAL MET 10/7  Short Term Goal 2: pt will perform functional transfer with supervision- GOAL MET 10/7  Patient Goals   Patient goals : to return home       Therapy Time   Individual Concurrent Group Co-treatment   Time In 1036         Time Out 1054         Minutes 18         Timed Code Treatment Minutes: 8 Minutes (10 minute eval)     Laurie let this note serve as discharge summary.   Please see case management note for discharge disposition. Thank you.    Wayne Merino, OT

## 2022-10-08 LAB
ALBUMIN SERPL-MCNC: 3.4 G/DL (ref 3.4–5)
ANION GAP SERPL CALCULATED.3IONS-SCNC: 6 MMOL/L (ref 3–16)
BUN BLDV-MCNC: 14 MG/DL (ref 7–20)
CALCIUM SERPL-MCNC: 10.1 MG/DL (ref 8.3–10.6)
CHLORIDE BLD-SCNC: 111 MMOL/L (ref 99–110)
CO2: 26 MMOL/L (ref 21–32)
CREAT SERPL-MCNC: 0.8 MG/DL (ref 0.6–1.2)
GFR AFRICAN AMERICAN: >60
GFR NON-AFRICAN AMERICAN: >60
GLUCOSE BLD-MCNC: 88 MG/DL (ref 70–99)
HCT VFR BLD CALC: 44.5 % (ref 36–48)
HEMOGLOBIN: 14.8 G/DL (ref 12–16)
MCH RBC QN AUTO: 29.4 PG (ref 26–34)
MCHC RBC AUTO-ENTMCNC: 33.2 G/DL (ref 31–36)
MCV RBC AUTO: 88.8 FL (ref 80–100)
PDW BLD-RTO: 14.1 % (ref 12.4–15.4)
PHOSPHORUS: 2.4 MG/DL (ref 2.5–4.9)
PLATELET # BLD: 129 K/UL (ref 135–450)
PMV BLD AUTO: 10.3 FL (ref 5–10.5)
POTASSIUM SERPL-SCNC: 3.8 MMOL/L (ref 3.5–5.1)
RBC # BLD: 5.01 M/UL (ref 4–5.2)
SODIUM BLD-SCNC: 143 MMOL/L (ref 136–145)
WBC # BLD: 5.5 K/UL (ref 4–11)

## 2022-10-08 PROCEDURE — 6370000000 HC RX 637 (ALT 250 FOR IP): Performed by: INTERNAL MEDICINE

## 2022-10-08 PROCEDURE — 2580000003 HC RX 258: Performed by: INTERNAL MEDICINE

## 2022-10-08 PROCEDURE — 36415 COLL VENOUS BLD VENIPUNCTURE: CPT

## 2022-10-08 PROCEDURE — 6360000002 HC RX W HCPCS: Performed by: INTERNAL MEDICINE

## 2022-10-08 PROCEDURE — 85027 COMPLETE CBC AUTOMATED: CPT

## 2022-10-08 PROCEDURE — 80069 RENAL FUNCTION PANEL: CPT

## 2022-10-08 PROCEDURE — 2500000003 HC RX 250 WO HCPCS: Performed by: INTERNAL MEDICINE

## 2022-10-08 PROCEDURE — 1200000000 HC SEMI PRIVATE

## 2022-10-08 RX ADMIN — POTASSIUM PHOSPHATE, MONOBASIC AND POTASSIUM PHOSPHATE, DIBASIC 20 MMOL: 224; 236 INJECTION, SOLUTION, CONCENTRATE INTRAVENOUS at 11:48

## 2022-10-08 RX ADMIN — AMOXICILLIN AND CLAVULANATE POTASSIUM 1 TABLET: 875; 125 TABLET, FILM COATED ORAL at 09:38

## 2022-10-08 RX ADMIN — Medication 10 ML: at 21:04

## 2022-10-08 RX ADMIN — SODIUM CHLORIDE 25 ML: 9 INJECTION, SOLUTION INTRAVENOUS at 11:46

## 2022-10-08 RX ADMIN — AMLODIPINE BESYLATE 2.5 MG: 2.5 TABLET ORAL at 09:37

## 2022-10-08 RX ADMIN — METOPROLOL TARTRATE 75 MG: 25 TABLET, FILM COATED ORAL at 21:04

## 2022-10-08 RX ADMIN — DICYCLOMINE HYDROCHLORIDE 10 MG: 10 CAPSULE ORAL at 09:38

## 2022-10-08 RX ADMIN — FAMOTIDINE 20 MG: 20 TABLET, FILM COATED ORAL at 09:38

## 2022-10-08 RX ADMIN — ASPIRIN 81 MG: 81 TABLET, COATED ORAL at 09:37

## 2022-10-08 RX ADMIN — ISOSORBIDE MONONITRATE 15 MG: 30 TABLET, EXTENDED RELEASE ORAL at 09:38

## 2022-10-08 RX ADMIN — ROSUVASTATIN CALCIUM 10 MG: 10 TABLET, FILM COATED ORAL at 09:38

## 2022-10-08 RX ADMIN — AMOXICILLIN AND CLAVULANATE POTASSIUM 1 TABLET: 875; 125 TABLET, FILM COATED ORAL at 21:04

## 2022-10-08 RX ADMIN — ENOXAPARIN SODIUM 40 MG: 100 INJECTION SUBCUTANEOUS at 09:37

## 2022-10-08 RX ADMIN — DICYCLOMINE HYDROCHLORIDE 10 MG: 10 CAPSULE ORAL at 18:25

## 2022-10-08 RX ADMIN — DICYCLOMINE HYDROCHLORIDE 10 MG: 10 CAPSULE ORAL at 21:04

## 2022-10-08 RX ADMIN — METOPROLOL TARTRATE 75 MG: 25 TABLET, FILM COATED ORAL at 09:37

## 2022-10-08 ASSESSMENT — PAIN DESCRIPTION - ORIENTATION: ORIENTATION: LEFT;LOWER

## 2022-10-08 ASSESSMENT — PAIN SCALES - GENERAL
PAINLEVEL_OUTOF10: 2
PAINLEVEL_OUTOF10: 3
PAINLEVEL_OUTOF10: 1

## 2022-10-08 ASSESSMENT — PAIN DESCRIPTION - LOCATION
LOCATION: ABDOMEN

## 2022-10-08 ASSESSMENT — PAIN DESCRIPTION - DESCRIPTORS: DESCRIPTORS: DISCOMFORT

## 2022-10-08 NOTE — PROGRESS NOTES
Hospitalist Progress Note      PCP: Haritha Kendrick MD    Date of Admission: 10/6/2022    Chief Complaint: Abdominal Pain w/ Nausea    Subjective: No new c/o. Medications:  Reviewed    Infusion Medications    sodium chloride       Scheduled Medications    amoxicillin-clavulanate  1 tablet Oral BID    amLODIPine  2.5 mg Oral Daily    aspirin  81 mg Oral Daily    dicyclomine  10 mg Oral 4x Daily    famotidine  20 mg Oral Daily    isosorbide mononitrate  15 mg Oral Daily    metoprolol tartrate  75 mg Oral BID    rosuvastatin  10 mg Oral Daily    sodium chloride flush  5-40 mL IntraVENous 2 times per day    enoxaparin  40 mg SubCUTAneous Daily     PRN Meds: sodium chloride flush, sodium chloride, polyethylene glycol, acetaminophen **OR** acetaminophen, prochlorperazine, morphine **OR** morphine      Intake/Output Summary (Last 24 hours) at 10/8/2022 0916  Last data filed at 10/7/2022 2105  Gross per 24 hour   Intake 1200 ml   Output 400 ml   Net 800 ml         Physical Exam Performed:    BP (!) 164/73   Pulse 63   Temp 98.4 °F (36.9 °C) (Oral)   Resp 16   Ht 5' 5\" (1.651 m)   Wt 148 lb (67.1 kg)   SpO2 94%   BMI 24.63 kg/m²     General appearance: No apparent distress, appears stated age and cooperative. HEENT: Pupils equal, round, and reactive to light. Conjunctivae/corneas clear. Neck: Supple, with full range of motion. No jugular venous distention. Trachea midline. Respiratory:  Normal respiratory effort. Clear to auscultation, bilaterally without Rales/Wheezes/Rhonchi. Cardiovascular: Regular rate and rhythm with normal S1/S2 without murmurs, rubs or gallops. Abdomen: Soft, non-tender, non-distended with normal bowel sounds. Musculoskeletal: No clubbing, cyanosis or edema bilaterally. Full range of motion without deformity. Skin: Skin color, texture, turgor normal.  No rashes or lesions. Neurologic:  Neurovascularly intact without any focal sensory/motor deficits.  Cranial nerves: II-XII intact, grossly non-focal.  Psychiatric: Alert and oriented, thought content appropriate, normal insight  Capillary Refill: Brisk,< 3 seconds   Peripheral Pulses: +2 palpable, equal bilaterally       Labs:   Recent Labs     10/06/22  0252 10/07/22  0718 10/08/22  0700   WBC 12.3* 6.3 5.5   HGB 17.1* 14.0 14.8   HCT 51.9* 42.0 44.5    131* 129*       Recent Labs     10/06/22  0252 10/07/22  0718 10/08/22  0700    143 143   K 4.4 4.1 3.8    111* 111*   CO2 27 26 26   BUN 14 15 14   CREATININE 0.9 0.8 0.8   CALCIUM 11.3* 10.0 10.1   PHOS  --  2.4* 2.4*       Recent Labs     10/06/22  0252 10/07/22  0718   AST 24 20   ALT 14 10   BILITOT 0.7 0.8   ALKPHOS 76 57       No results for input(s): INR in the last 72 hours. No results for input(s): Lorin Millard in the last 72 hours. Urinalysis:      Lab Results   Component Value Date/Time    NITRU Negative 10/06/2022 05:07 AM    WBCUA 3-5 03/31/2022 06:20 PM    RBCUA None seen 03/31/2022 06:20 PM    BLOODU Negative 10/06/2022 05:07 AM    SPECGRAV <=1.005 10/06/2022 05:07 AM    GLUCOSEU Negative 10/06/2022 05:07 AM       Consults:    IP CONSULT TO HOSPITALIST  IP CONSULT TO GENERAL SURGERY      Assessment/Plan:    Active Hospital Problems    Diagnosis     Abdominal pain [R10.9]      Priority: Medium    Enteritis [K52.9]      Priority: Medium         Enteritis - as seen on admission CT abdomen - independently reviewed. Acute bacterial possible - stared on Zosyn and transtioned to PO Augmentin. General Surgery consulted and appreciated. Diet advanced to full liquids 7 October but poorly tolerated so will not yet advance further. HTN - w/out known CAD and no evidence of active signs/sxs of ischemia/failure. Currently controlled on home meds w/ vitals reviewed and documented as above. HypoPhosphatemia - etiology clinically unable to determine. Follow serial labs and replace PRN - ordered IV 8 October.   Reviewed and documented as above.    HyperLipidemia - controlled on home Statin. Continue, w/ f/u and med adjustment w/ PCP       DVT Prophylaxis: LMWH     Recent Labs     10/06/22  0252 10/07/22  0718 10/08/22  0700    131* 129*       Diet: ADULT DIET; Full Liquid  Code Status: Full Code      PT/OT Eval Status: seen w/ recs for home w/ assist.     Dispo - Patient is likely to remain in-house at least until Sat/Sunday 8/9 October pending clinical course, subspecialty recs and  PT/OT eval/recs. Daughter Jacinda Hernandez present at bedside when seen 6 October.      Shi Zuniga MD

## 2022-10-09 VITALS
WEIGHT: 148 LBS | BODY MASS INDEX: 24.66 KG/M2 | RESPIRATION RATE: 16 BRPM | DIASTOLIC BLOOD PRESSURE: 57 MMHG | TEMPERATURE: 98 F | HEART RATE: 54 BPM | OXYGEN SATURATION: 96 % | HEIGHT: 65 IN | SYSTOLIC BLOOD PRESSURE: 137 MMHG

## 2022-10-09 LAB
ALBUMIN SERPL-MCNC: 3.6 G/DL (ref 3.4–5)
ANION GAP SERPL CALCULATED.3IONS-SCNC: 10 MMOL/L (ref 3–16)
BUN BLDV-MCNC: 12 MG/DL (ref 7–20)
CALCIUM SERPL-MCNC: 9.8 MG/DL (ref 8.3–10.6)
CHLORIDE BLD-SCNC: 109 MMOL/L (ref 99–110)
CO2: 25 MMOL/L (ref 21–32)
CREAT SERPL-MCNC: 0.8 MG/DL (ref 0.6–1.2)
GFR AFRICAN AMERICAN: >60
GFR NON-AFRICAN AMERICAN: >60
GLUCOSE BLD-MCNC: 85 MG/DL (ref 70–99)
HCT VFR BLD CALC: 41.4 % (ref 36–48)
HEMOGLOBIN: 13.7 G/DL (ref 12–16)
MAGNESIUM: 2.1 MG/DL (ref 1.8–2.4)
MCH RBC QN AUTO: 29.5 PG (ref 26–34)
MCHC RBC AUTO-ENTMCNC: 33.1 G/DL (ref 31–36)
MCV RBC AUTO: 89.2 FL (ref 80–100)
PDW BLD-RTO: 13.8 % (ref 12.4–15.4)
PHOSPHORUS: 3.2 MG/DL (ref 2.5–4.9)
PLATELET # BLD: 127 K/UL (ref 135–450)
PMV BLD AUTO: 10.8 FL (ref 5–10.5)
POTASSIUM SERPL-SCNC: 4.3 MMOL/L (ref 3.5–5.1)
RBC # BLD: 4.65 M/UL (ref 4–5.2)
SODIUM BLD-SCNC: 144 MMOL/L (ref 136–145)
WBC # BLD: 4.8 K/UL (ref 4–11)

## 2022-10-09 PROCEDURE — 85027 COMPLETE CBC AUTOMATED: CPT

## 2022-10-09 PROCEDURE — 36415 COLL VENOUS BLD VENIPUNCTURE: CPT

## 2022-10-09 PROCEDURE — 6370000000 HC RX 637 (ALT 250 FOR IP): Performed by: INTERNAL MEDICINE

## 2022-10-09 PROCEDURE — 83735 ASSAY OF MAGNESIUM: CPT

## 2022-10-09 PROCEDURE — 80069 RENAL FUNCTION PANEL: CPT

## 2022-10-09 PROCEDURE — 2580000003 HC RX 258: Performed by: INTERNAL MEDICINE

## 2022-10-09 RX ORDER — AMOXICILLIN AND CLAVULANATE POTASSIUM 875; 125 MG/1; MG/1
1 TABLET, FILM COATED ORAL 2 TIMES DAILY
Qty: 14 TABLET | Refills: 0 | Status: SHIPPED | OUTPATIENT
Start: 2022-10-09 | End: 2022-10-16

## 2022-10-09 RX ADMIN — ASPIRIN 81 MG: 81 TABLET, COATED ORAL at 08:26

## 2022-10-09 RX ADMIN — ROSUVASTATIN CALCIUM 10 MG: 10 TABLET, FILM COATED ORAL at 08:26

## 2022-10-09 RX ADMIN — Medication 10 ML: at 08:31

## 2022-10-09 RX ADMIN — AMOXICILLIN AND CLAVULANATE POTASSIUM 1 TABLET: 875; 125 TABLET, FILM COATED ORAL at 08:25

## 2022-10-09 RX ADMIN — DICYCLOMINE HYDROCHLORIDE 10 MG: 10 CAPSULE ORAL at 08:27

## 2022-10-09 RX ADMIN — FAMOTIDINE 20 MG: 20 TABLET, FILM COATED ORAL at 08:26

## 2022-10-09 RX ADMIN — AMLODIPINE BESYLATE 2.5 MG: 2.5 TABLET ORAL at 08:26

## 2022-10-09 RX ADMIN — ISOSORBIDE MONONITRATE 15 MG: 30 TABLET, EXTENDED RELEASE ORAL at 08:26

## 2022-10-09 RX ADMIN — METOPROLOL TARTRATE 75 MG: 25 TABLET, FILM COATED ORAL at 08:26

## 2022-10-09 ASSESSMENT — PAIN SCALES - GENERAL: PAINLEVEL_OUTOF10: 0

## 2022-10-09 NOTE — PROGRESS NOTES
Patient discharged. IV removed. Lockbox emptied. All belongings gathered and returned to patient. Discharge instructions reviewed with patient, all questions answered by RN. Prescription paper sent with patient. No further needs.

## 2022-10-09 NOTE — PROGRESS NOTES
Assessment complete and documented. Pt a&ox4. VSS. Pt denies pain at this time. Safety precaution in place. Bed locked and in the lowest position. Call light and bedside table within reach. Pt independent and calls out appropriately. Will continue to monitor.

## 2022-10-09 NOTE — PROGRESS NOTES
Hospitalist Progress Note      PCP: Lucy Paula MD    Date of Admission: 10/6/2022    Chief Complaint: Abdominal Pain w/ Nausea    Subjective: No new c/o. Medications:  Reviewed    Infusion Medications    sodium chloride 25 mL (10/08/22 1146)     Scheduled Medications    amoxicillin-clavulanate  1 tablet Oral BID    amLODIPine  2.5 mg Oral Daily    aspirin  81 mg Oral Daily    dicyclomine  10 mg Oral 4x Daily    famotidine  20 mg Oral Daily    isosorbide mononitrate  15 mg Oral Daily    metoprolol tartrate  75 mg Oral BID    rosuvastatin  10 mg Oral Daily    sodium chloride flush  5-40 mL IntraVENous 2 times per day    enoxaparin  40 mg SubCUTAneous Daily     PRN Meds: sodium chloride flush, sodium chloride, polyethylene glycol, acetaminophen **OR** acetaminophen, prochlorperazine, morphine **OR** morphine      Intake/Output Summary (Last 24 hours) at 10/9/2022 0957  Last data filed at 10/8/2022 1308  Gross per 24 hour   Intake 480 ml   Output --   Net 480 ml         Physical Exam Performed:    BP (!) 137/57   Pulse 54   Temp 98 °F (36.7 °C) (Oral)   Resp 16   Ht 5' 5\" (1.651 m)   Wt 148 lb (67.1 kg)   SpO2 96%   BMI 24.63 kg/m²     General appearance: No apparent distress, appears stated age and cooperative. HEENT: Pupils equal, round, and reactive to light. Conjunctivae/corneas clear. Neck: Supple, with full range of motion. No jugular venous distention. Trachea midline. Respiratory:  Normal respiratory effort. Clear to auscultation, bilaterally without Rales/Wheezes/Rhonchi. Cardiovascular: Regular rate and rhythm with normal S1/S2 without murmurs, rubs or gallops. Abdomen: Soft, non-tender, non-distended with normal bowel sounds. Musculoskeletal: No clubbing, cyanosis or edema bilaterally. Full range of motion without deformity. Skin: Skin color, texture, turgor normal.  No rashes or lesions. Neurologic:  Neurovascularly intact without any focal sensory/motor deficits.  Cranial nerves: II-XII intact, grossly non-focal.  Psychiatric: Alert and oriented, thought content appropriate, normal insight  Capillary Refill: Brisk,< 3 seconds   Peripheral Pulses: +2 palpable, equal bilaterally       Labs:   Recent Labs     10/07/22  0718 10/08/22  0700 10/09/22  0608   WBC 6.3 5.5 4.8   HGB 14.0 14.8 13.7   HCT 42.0 44.5 41.4   * 129* 127*       Recent Labs     10/07/22  0718 10/08/22  0700 10/09/22  0608    143 144   K 4.1 3.8 4.3   * 111* 109   CO2 26 26 25   BUN 15 14 12   CREATININE 0.8 0.8 0.8   CALCIUM 10.0 10.1 9.8   PHOS 2.4* 2.4* 3.2       Recent Labs     10/07/22  0718   AST 20   ALT 10   BILITOT 0.8   ALKPHOS 57       No results for input(s): INR in the last 72 hours. No results for input(s): Adriane Fuse in the last 72 hours. Urinalysis:      Lab Results   Component Value Date/Time    NITRU Negative 10/06/2022 05:07 AM    WBCUA 3-5 03/31/2022 06:20 PM    RBCUA None seen 03/31/2022 06:20 PM    BLOODU Negative 10/06/2022 05:07 AM    SPECGRAV <=1.005 10/06/2022 05:07 AM    GLUCOSEU Negative 10/06/2022 05:07 AM       Consults:    IP CONSULT TO HOSPITALIST  IP CONSULT TO GENERAL SURGERY      Assessment/Plan:    Active Hospital Problems    Diagnosis     Abdominal pain [R10.9]      Priority: Medium    Enteritis [K52.9]      Priority: Medium         Enteritis - as seen on admission CT abdomen - independently reviewed. Acute bacterial possible - stared on Zosyn and transtioned to PO Augmentin. General Surgery consulted and appreciated. Diet advanced to full liquids 7 October and Regular diet 9 October     HTN - w/out known CAD and no evidence of active signs/sxs of ischemia/failure. Currently controlled on home meds w/ vitals reviewed and documented as above. HypoPhosphatemia - etiology clinically unable to determine. Follow serial labs and replace PRN - ordered IV 8 October. Reviewed and documented as above. HyperLipidemia - controlled on home Statin. Continue, w/ f/u and med adjustment w/ PCP       DVT Prophylaxis: LMWH     Recent Labs     10/07/22  0718 10/08/22  0700 10/09/22  0608   * 129* 127*       Diet: ADULT DIET; Regular  Code Status: Full Code      PT/OT Eval Status: seen w/ recs for home w/ assist.     Dispo - Patient is likely to remain in-house at least until Snday/Monday 10/11 October pending clinical course. Daughter Geri Hawely present at bedside when seen 6 October.      Silvestre Negron MD

## 2022-10-09 NOTE — CARE COORDINATION
D/c order noted. Spoke with [employer]. No concerns for d/c. Therapy, eval only. No CM needs identified.  Chris Manning RN

## 2022-10-10 ENCOUNTER — CARE COORDINATION (OUTPATIENT)
Dept: CASE MANAGEMENT | Age: 87
End: 2022-10-10

## 2022-10-10 NOTE — DISCHARGE SUMMARY
Hospital Medicine Discharge Summary    Patient ID: Hermila Dunn      Patient's PCP: Kristan Villa MD    Admit Date: 10/6/2022     Discharge Date: 10/9/2022      Admitting Physician: Araseli Pichardo MD     Discharge Physician: Nathanael Galindo MD     Discharge Diagnoses: Active Hospital Problems    Diagnosis     Abdominal pain [R10.9]      Priority: Medium    Enteritis [K52.9]      Priority: Medium       The patient was seen and examined on day of discharge and this discharge summary is in conjunction with any daily progress note from day of discharge. Hospital Course:         Enteritis - as seen on admission CT abdomen - independently reviewed. Acute bacterial possible - started on Zosyn and transtioned to PO Augmentin. General Surgery consulted and appreciated. Diet advanced to full liquids 7 October and Regular diet 9 October - tolerated     HTN - w/out known CAD and no evidence of active signs/sxs of ischemia/failure. Currently controlled on home meds      HypoPhosphatemia - etiology clinically unable to determine. Followed serial labs and replaced PRN - ordered IV 8 October. HyperLipidemia - controlled on home Statin. Continue, w/ f/u and med adjustment w/ PCP       Labs:  For convenience and continuity at follow-up the following most recent labs are provided:      CBC:    Lab Results   Component Value Date/Time    WBC 4.8 10/09/2022 06:08 AM    HGB 13.7 10/09/2022 06:08 AM    HCT 41.4 10/09/2022 06:08 AM     10/09/2022 06:08 AM       Renal:    Lab Results   Component Value Date/Time     10/09/2022 06:08 AM    K 4.3 10/09/2022 06:08 AM    K 4.1 10/07/2022 07:18 AM     10/09/2022 06:08 AM    CO2 25 10/09/2022 06:08 AM    BUN 12 10/09/2022 06:08 AM    CREATININE 0.8 10/09/2022 06:08 AM    CALCIUM 9.8 10/09/2022 06:08 AM    PHOS 3.2 10/09/2022 06:08 AM         Significant Diagnostic Studies    Radiology:   CT ABDOMEN PELVIS W IV CONTRAST Additional Contrast? None Final Result   1. Acute inflammatory or infectious enteritis involving the ileum, with   partial obstruction and reactive mesenteric edema and small volume ascites. 2. Diverticulosis without acute diverticulitis. 3. Atherosclerotic disease including coronary artery involvement. 4. Small hiatal hernia. Moderate gastric distention without obstructive mass. Consults:     IP CONSULT TO HOSPITALIST  IP CONSULT TO GENERAL SURGERY    Disposition: Home     Condition at Discharge: Stable    Discharge Instructions/Follow-up:  w/ PCP 1-2 weeks and subspecialists as arranged. Code Status:  Full Code    Activity: activity as tolerated    Diet: regular diet      Discharge Medications:     Discharge Medication List as of 10/9/2022 11:58 AM             Details   amoxicillin-clavulanate (AUGMENTIN) 875-125 MG per tablet Take 1 tablet by mouth in the morning and at bedtime for 7 days, Disp-14 tablet, R-0Print                Details   isosorbide mononitrate (IMDUR) 30 MG extended release tablet Take 15 mg by mouth Historical Med      aspirin 81 MG tablet Take 81 mg by mouth daily      amLODIPine (NORVASC) 2.5 MG tablet Take 2.5 mg by mouth daily. metoprolol (LOPRESSOR) 50 MG tablet Take 50 mg by mouth 2 times daily. rosuvastatin (CRESTOR) 5 MG tablet Take 1 tablet by mouth daily. , Disp-60 tablet, R-0             Time Spent on discharge is more than 30 minutes in the examination, evaluation, counseling and review of medications and discharge plan. Signed:    Silvestre Negron MD   10/10/2022      Thank you Jim Louis MD for the opportunity to be involved in this patient's care. If you have any questions or concerns please feel free to contact me at [phone].

## 2022-10-10 NOTE — CARE COORDINATION
[address] Care Transitions Initial Follow Up Call    Call within 2 business days of discharge: Yes    Patient: Janice Cisneros Patient : 1932   MRN: [id number]  Reason for Admission: abdominal pain  Discharge Date: 10/9/22 RARS: Readmission Risk Score: 9.5      Last Discharge [address]       Date Complaint Diagnosis Description Type Department Provider    10/6/22 Abdominal Pain; Nausea Enteritis ED to Hosp-Admission (Discharged) (ADMITTED) Mikki Dey MD; Keerthi Chew. .. Care Transitions 24 Hour Call    Care Transitions Interventions       Attempted to reach patient via phone for initial post hospital transition call. VM left stating purpose of call along with my contact information requesting a return call. Follow Up  No future appointments.     Sue Norman RN

## 2022-10-11 ENCOUNTER — CARE COORDINATION (OUTPATIENT)
Dept: CASE MANAGEMENT | Age: 87
End: 2022-10-11

## 2022-10-11 NOTE — CARE COORDINATION
[facility] Care Transitions Initial Follow Up Call    Call within 2 business days of discharge: Yes    Patient: Dallas Moreno Patient : 1932   MRN: [id number]  Reason for Admission: enteritis  Discharge Date: 10/9/22 RARS: Readmission Risk Score: 9.5      Last Discharge [address]       Date Complaint Diagnosis Description Type Department Provider    10/6/22 Abdominal Pain; Nausea Enteritis ED to Hosp-Admission (Discharged) (ADMITTED) Yael Pretty MD; Brandon Canal. .. Second and final attempt made to reach patient for initial post hospital transition call. VM left stating purpose of call along with my contact information requesting a return call. Care Transitions 24 Hour Call    Care Transitions Interventions         Follow Up  No future appointments.   Perry Vargas RN

## 2022-10-18 NOTE — PROGRESS NOTES
Physician Progress Note      PATIENT:               Alyssa Gaitan  CSN #:                  [id number]  :                       1932  ADMIT DATE:       10/6/2022 2:34 AM  DISCH DATE:        10/9/2022 12:42 PM  RESPONDING  PROVIDER #:        Yelena Hope MD          QUERY TEXT:    Pt admitted with bacterial enteritis. Pt noted to have leukocytosis and   elevated lactic acid. If possible, please document in the progress notes and   discharge summary if you are evaluating and /or treating any of the following: The medical record reflects the following:  Risk Factors: bacterial enteritis  Clinical Indicators: WBC 12.3, lactic acid 2.6, vitals on 10/6: T 97.8, RR   13-22, HR 54-71, -162, SPO2 91-98%  Treatment: IV Zosyn, CT,  transitioned to PO Augmentin at DC, serial labs,   General surgery consult,  IVF bolus, supportive care    Thank you,  Dereck Gaitan RN, CDS  [email or web address]  Options provided:  -- Possible Sepsis, present on admission  -- SIRS related to SBO, no sepsis  -- Other - I will add my own diagnosis  -- Disagree - Not applicable / Not valid  -- Disagree - Clinically unable to determine / Unknown  -- Refer to Clinical Documentation Reviewer    PROVIDER RESPONSE TEXT:    After further study, patient has SIRS related to SBO, no sepsis.     Query created by: Lexis Ortiz on 10/18/2022 10:59 AM      Electronically signed by:  Yelena Hope MD 10/18/2022 11:20 AM

## 2022-11-26 ENCOUNTER — HOSPITAL ENCOUNTER (INPATIENT)
Age: 87
LOS: 1 days | Discharge: HOME | DRG: 313 | End: 2022-11-28
Attending: EMERGENCY MEDICINE | Admitting: INTERNAL MEDICINE
Payer: MEDICARE

## 2022-11-26 ENCOUNTER — APPOINTMENT (OUTPATIENT)
Dept: GENERAL RADIOLOGY | Age: 87
DRG: 313 | End: 2022-11-26
Payer: MEDICARE

## 2022-11-26 DIAGNOSIS — R07.9 CHEST PAIN, UNSPECIFIED TYPE: Primary | ICD-10-CM

## 2022-11-26 LAB
A/G RATIO: 1.4 (ref 1.1–2.2)
ALBUMIN SERPL-MCNC: 3.9 G/DL (ref 3.4–5)
ALP BLD-CCNC: 69 U/L (ref 40–129)
ALT SERPL-CCNC: 16 U/L (ref 10–40)
ANION GAP SERPL CALCULATED.3IONS-SCNC: 10 MMOL/L (ref 3–16)
AST SERPL-CCNC: 28 U/L (ref 15–37)
BASOPHILS ABSOLUTE: 0.1 K/UL (ref 0–0.2)
BASOPHILS RELATIVE PERCENT: 0.8 %
BILIRUB SERPL-MCNC: 0.4 MG/DL (ref 0–1)
BILIRUBIN URINE: NEGATIVE
BLOOD, URINE: NEGATIVE
BUN BLDV-MCNC: 15 MG/DL (ref 7–20)
CALCIUM SERPL-MCNC: 10.5 MG/DL (ref 8.3–10.6)
CHLORIDE BLD-SCNC: 103 MMOL/L (ref 99–110)
CLARITY: CLEAR
CO2: 23 MMOL/L (ref 21–32)
COLOR: NORMAL
CREAT SERPL-MCNC: 0.7 MG/DL (ref 0.6–1.2)
EOSINOPHILS ABSOLUTE: 0.4 K/UL (ref 0–0.6)
EOSINOPHILS RELATIVE PERCENT: 5.8 %
GFR SERPL CREATININE-BSD FRML MDRD: >60 ML/MIN/{1.73_M2}
GLUCOSE BLD-MCNC: 109 MG/DL (ref 70–99)
GLUCOSE URINE: NEGATIVE MG/DL
HCT VFR BLD CALC: 46 % (ref 36–48)
HEMOGLOBIN: 15.5 G/DL (ref 12–16)
KETONES, URINE: NEGATIVE MG/DL
LEUKOCYTE ESTERASE, URINE: NEGATIVE
LYMPHOCYTES ABSOLUTE: 1.2 K/UL (ref 1–5.1)
LYMPHOCYTES RELATIVE PERCENT: 18.4 %
MCH RBC QN AUTO: 29.5 PG (ref 26–34)
MCHC RBC AUTO-ENTMCNC: 33.6 G/DL (ref 31–36)
MCV RBC AUTO: 87.7 FL (ref 80–100)
MICROSCOPIC EXAMINATION: NORMAL
MONOCYTES ABSOLUTE: 0.7 K/UL (ref 0–1.3)
MONOCYTES RELATIVE PERCENT: 9.7 %
NEUTROPHILS ABSOLUTE: 4.4 K/UL (ref 1.7–7.7)
NEUTROPHILS RELATIVE PERCENT: 65.3 %
NITRITE, URINE: NEGATIVE
PDW BLD-RTO: 14.2 % (ref 12.4–15.4)
PH UA: 5.5 (ref 5–8)
PLATELET # BLD: 158 K/UL (ref 135–450)
PMV BLD AUTO: 9.9 FL (ref 5–10.5)
POTASSIUM REFLEX MAGNESIUM: 4 MMOL/L (ref 3.5–5.1)
PRO-BNP: 257 PG/ML (ref 0–449)
PROTEIN UA: NEGATIVE MG/DL
RBC # BLD: 5.25 M/UL (ref 4–5.2)
SODIUM BLD-SCNC: 136 MMOL/L (ref 136–145)
SPECIFIC GRAVITY UA: <=1.005 (ref 1–1.03)
TOTAL PROTEIN: 6.6 G/DL (ref 6.4–8.2)
TROPONIN: <0.01 NG/ML
TROPONIN: <0.01 NG/ML
URINE TYPE: NORMAL
UROBILINOGEN, URINE: 0.2 E.U./DL
WBC # BLD: 6.7 K/UL (ref 4–11)

## 2022-11-26 PROCEDURE — 71045 X-RAY EXAM CHEST 1 VIEW: CPT

## 2022-11-26 PROCEDURE — 93005 ELECTROCARDIOGRAM TRACING: CPT | Performed by: EMERGENCY MEDICINE

## 2022-11-26 PROCEDURE — 80053 COMPREHEN METABOLIC PANEL: CPT

## 2022-11-26 PROCEDURE — 84484 ASSAY OF TROPONIN QUANT: CPT

## 2022-11-26 PROCEDURE — 83880 ASSAY OF NATRIURETIC PEPTIDE: CPT

## 2022-11-26 PROCEDURE — 99285 EMERGENCY DEPT VISIT HI MDM: CPT

## 2022-11-26 PROCEDURE — 85025 COMPLETE CBC W/AUTO DIFF WBC: CPT

## 2022-11-26 PROCEDURE — 6370000000 HC RX 637 (ALT 250 FOR IP): Performed by: PHYSICIAN ASSISTANT

## 2022-11-26 PROCEDURE — 81003 URINALYSIS AUTO W/O SCOPE: CPT

## 2022-11-26 PROCEDURE — G0378 HOSPITAL OBSERVATION PER HR: HCPCS

## 2022-11-26 RX ORDER — ISOSORBIDE MONONITRATE 30 MG/1
15 TABLET, EXTENDED RELEASE ORAL DAILY
Status: DISCONTINUED | OUTPATIENT
Start: 2022-11-27 | End: 2022-11-27

## 2022-11-26 RX ORDER — ENOXAPARIN SODIUM 100 MG/ML
40 INJECTION SUBCUTANEOUS DAILY
Status: DISCONTINUED | OUTPATIENT
Start: 2022-11-27 | End: 2022-11-28 | Stop reason: HOSPADM

## 2022-11-26 RX ORDER — POLYETHYLENE GLYCOL 3350 17 G/17G
17 POWDER, FOR SOLUTION ORAL DAILY PRN
Status: DISCONTINUED | OUTPATIENT
Start: 2022-11-26 | End: 2022-11-28 | Stop reason: HOSPADM

## 2022-11-26 RX ORDER — AMLODIPINE BESYLATE 2.5 MG/1
2.5 TABLET ORAL DAILY
Status: DISCONTINUED | OUTPATIENT
Start: 2022-11-27 | End: 2022-11-27

## 2022-11-26 RX ORDER — ASPIRIN 81 MG/1
324 TABLET, CHEWABLE ORAL ONCE
Status: COMPLETED | OUTPATIENT
Start: 2022-11-26 | End: 2022-11-26

## 2022-11-26 RX ORDER — SODIUM CHLORIDE 9 MG/ML
INJECTION, SOLUTION INTRAVENOUS PRN
Status: DISCONTINUED | OUTPATIENT
Start: 2022-11-26 | End: 2022-11-28 | Stop reason: HOSPADM

## 2022-11-26 RX ORDER — SODIUM CHLORIDE 0.9 % (FLUSH) 0.9 %
10 SYRINGE (ML) INJECTION PRN
Status: DISCONTINUED | OUTPATIENT
Start: 2022-11-26 | End: 2022-11-28 | Stop reason: HOSPADM

## 2022-11-26 RX ORDER — SODIUM CHLORIDE 0.9 % (FLUSH) 0.9 %
10 SYRINGE (ML) INJECTION EVERY 12 HOURS SCHEDULED
Status: DISCONTINUED | OUTPATIENT
Start: 2022-11-27 | End: 2022-11-28 | Stop reason: HOSPADM

## 2022-11-26 RX ORDER — SODIUM CHLORIDE 9 MG/ML
INJECTION, SOLUTION INTRAVENOUS CONTINUOUS
Status: DISCONTINUED | OUTPATIENT
Start: 2022-11-27 | End: 2022-11-28

## 2022-11-26 RX ORDER — ROSUVASTATIN CALCIUM 10 MG/1
10 TABLET, COATED ORAL DAILY
Status: DISCONTINUED | OUTPATIENT
Start: 2022-11-27 | End: 2022-11-28 | Stop reason: HOSPADM

## 2022-11-26 RX ORDER — ACETAMINOPHEN 325 MG/1
650 TABLET ORAL EVERY 4 HOURS PRN
Status: DISCONTINUED | OUTPATIENT
Start: 2022-11-26 | End: 2022-11-28 | Stop reason: HOSPADM

## 2022-11-26 RX ORDER — PROMETHAZINE HYDROCHLORIDE 25 MG/1
12.5 TABLET ORAL EVERY 6 HOURS PRN
Status: DISCONTINUED | OUTPATIENT
Start: 2022-11-26 | End: 2022-11-28 | Stop reason: HOSPADM

## 2022-11-26 RX ORDER — ASPIRIN 81 MG/1
81 TABLET, CHEWABLE ORAL DAILY
Status: DISCONTINUED | OUTPATIENT
Start: 2022-11-27 | End: 2022-11-28 | Stop reason: HOSPADM

## 2022-11-26 RX ORDER — ACETAMINOPHEN 650 MG/1
650 SUPPOSITORY RECTAL EVERY 4 HOURS PRN
Status: DISCONTINUED | OUTPATIENT
Start: 2022-11-26 | End: 2022-11-28 | Stop reason: HOSPADM

## 2022-11-26 RX ADMIN — ASPIRIN 324 MG: 81 TABLET, CHEWABLE ORAL at 18:01

## 2022-11-26 ASSESSMENT — PAIN SCALES - GENERAL: PAINLEVEL_OUTOF10: 0

## 2022-11-26 ASSESSMENT — PAIN - FUNCTIONAL ASSESSMENT: PAIN_FUNCTIONAL_ASSESSMENT: 0-10

## 2022-11-27 PROBLEM — R06.00 DYSPNEA: Status: ACTIVE | Noted: 2022-11-27

## 2022-11-27 PROBLEM — I10 UNCONTROLLED HYPERTENSION: Status: ACTIVE | Noted: 2022-11-27

## 2022-11-27 PROBLEM — I25.10 CORONARY ARTERY DISEASE: Status: ACTIVE | Noted: 2022-11-27

## 2022-11-27 LAB
ANION GAP SERPL CALCULATED.3IONS-SCNC: 8 MMOL/L (ref 3–16)
BASOPHILS ABSOLUTE: 0.1 K/UL (ref 0–0.2)
BASOPHILS RELATIVE PERCENT: 0.7 %
BUN BLDV-MCNC: 11 MG/DL (ref 7–20)
CALCIUM SERPL-MCNC: 10.3 MG/DL (ref 8.3–10.6)
CHLORIDE BLD-SCNC: 109 MMOL/L (ref 99–110)
CO2: 27 MMOL/L (ref 21–32)
CREAT SERPL-MCNC: 0.7 MG/DL (ref 0.6–1.2)
EKG ATRIAL RATE: 60 BPM
EKG ATRIAL RATE: 75 BPM
EKG DIAGNOSIS: NORMAL
EKG DIAGNOSIS: NORMAL
EKG P AXIS: 29 DEGREES
EKG P AXIS: 43 DEGREES
EKG P-R INTERVAL: 192 MS
EKG P-R INTERVAL: 242 MS
EKG Q-T INTERVAL: 380 MS
EKG Q-T INTERVAL: 408 MS
EKG QRS DURATION: 88 MS
EKG QRS DURATION: 92 MS
EKG QTC CALCULATION (BAZETT): 408 MS
EKG QTC CALCULATION (BAZETT): 424 MS
EKG R AXIS: -37 DEGREES
EKG R AXIS: -56 DEGREES
EKG T AXIS: 20 DEGREES
EKG T AXIS: 8 DEGREES
EKG VENTRICULAR RATE: 60 BPM
EKG VENTRICULAR RATE: 75 BPM
EOSINOPHILS ABSOLUTE: 0.5 K/UL (ref 0–0.6)
EOSINOPHILS RELATIVE PERCENT: 6.7 %
GFR SERPL CREATININE-BSD FRML MDRD: >60 ML/MIN/{1.73_M2}
GLUCOSE BLD-MCNC: 90 MG/DL (ref 70–99)
HCT VFR BLD CALC: 45.5 % (ref 36–48)
HEMOGLOBIN: 15.4 G/DL (ref 12–16)
LYMPHOCYTES ABSOLUTE: 1.3 K/UL (ref 1–5.1)
LYMPHOCYTES RELATIVE PERCENT: 19.3 %
MCH RBC QN AUTO: 30 PG (ref 26–34)
MCHC RBC AUTO-ENTMCNC: 33.8 G/DL (ref 31–36)
MCV RBC AUTO: 88.9 FL (ref 80–100)
MONOCYTES ABSOLUTE: 0.5 K/UL (ref 0–1.3)
MONOCYTES RELATIVE PERCENT: 7.8 %
NEUTROPHILS ABSOLUTE: 4.6 K/UL (ref 1.7–7.7)
NEUTROPHILS RELATIVE PERCENT: 65.5 %
PDW BLD-RTO: 14.3 % (ref 12.4–15.4)
PLATELET # BLD: 148 K/UL (ref 135–450)
PMV BLD AUTO: 9.6 FL (ref 5–10.5)
POTASSIUM REFLEX MAGNESIUM: 4.6 MMOL/L (ref 3.5–5.1)
RBC # BLD: 5.11 M/UL (ref 4–5.2)
SODIUM BLD-SCNC: 144 MMOL/L (ref 136–145)
TROPONIN: <0.01 NG/ML
TROPONIN: <0.01 NG/ML
WBC # BLD: 7 K/UL (ref 4–11)

## 2022-11-27 PROCEDURE — 36415 COLL VENOUS BLD VENIPUNCTURE: CPT

## 2022-11-27 PROCEDURE — 6370000000 HC RX 637 (ALT 250 FOR IP): Performed by: INTERNAL MEDICINE

## 2022-11-27 PROCEDURE — 80048 BASIC METABOLIC PNL TOTAL CA: CPT

## 2022-11-27 PROCEDURE — 2580000003 HC RX 258: Performed by: INTERNAL MEDICINE

## 2022-11-27 PROCEDURE — 96372 THER/PROPH/DIAG INJ SC/IM: CPT

## 2022-11-27 PROCEDURE — 84484 ASSAY OF TROPONIN QUANT: CPT

## 2022-11-27 PROCEDURE — 93010 ELECTROCARDIOGRAM REPORT: CPT | Performed by: INTERNAL MEDICINE

## 2022-11-27 PROCEDURE — 85025 COMPLETE CBC W/AUTO DIFF WBC: CPT

## 2022-11-27 PROCEDURE — 99223 1ST HOSP IP/OBS HIGH 75: CPT | Performed by: INTERNAL MEDICINE

## 2022-11-27 PROCEDURE — 93005 ELECTROCARDIOGRAM TRACING: CPT | Performed by: INTERNAL MEDICINE

## 2022-11-27 PROCEDURE — 97530 THERAPEUTIC ACTIVITIES: CPT

## 2022-11-27 PROCEDURE — G0378 HOSPITAL OBSERVATION PER HR: HCPCS

## 2022-11-27 PROCEDURE — 97161 PT EVAL LOW COMPLEX 20 MIN: CPT

## 2022-11-27 PROCEDURE — 6360000002 HC RX W HCPCS: Performed by: INTERNAL MEDICINE

## 2022-11-27 RX ORDER — METOPROLOL TARTRATE 50 MG/1
50 TABLET, FILM COATED ORAL 2 TIMES DAILY
Status: DISCONTINUED | OUTPATIENT
Start: 2022-11-27 | End: 2022-11-28 | Stop reason: HOSPADM

## 2022-11-27 RX ORDER — AMLODIPINE BESYLATE 5 MG/1
5 TABLET ORAL DAILY
Status: DISCONTINUED | OUTPATIENT
Start: 2022-11-28 | End: 2022-11-28 | Stop reason: HOSPADM

## 2022-11-27 RX ORDER — ISOSORBIDE MONONITRATE 30 MG/1
30 TABLET, EXTENDED RELEASE ORAL DAILY
Status: DISCONTINUED | OUTPATIENT
Start: 2022-11-28 | End: 2022-11-28 | Stop reason: HOSPADM

## 2022-11-27 RX ORDER — AMLODIPINE BESYLATE 2.5 MG/1
2.5 TABLET ORAL ONCE
Status: COMPLETED | OUTPATIENT
Start: 2022-11-27 | End: 2022-11-27

## 2022-11-27 RX ORDER — HYDRALAZINE HYDROCHLORIDE 20 MG/ML
10 INJECTION INTRAMUSCULAR; INTRAVENOUS EVERY 6 HOURS PRN
Status: DISCONTINUED | OUTPATIENT
Start: 2022-11-27 | End: 2022-11-28 | Stop reason: HOSPADM

## 2022-11-27 RX ADMIN — AMLODIPINE BESYLATE 2.5 MG: 2.5 TABLET ORAL at 17:22

## 2022-11-27 RX ADMIN — ACETAMINOPHEN 650 MG: 325 TABLET ORAL at 12:10

## 2022-11-27 RX ADMIN — SODIUM CHLORIDE: 9 INJECTION, SOLUTION INTRAVENOUS at 00:40

## 2022-11-27 RX ADMIN — SODIUM CHLORIDE: 9 INJECTION, SOLUTION INTRAVENOUS at 20:19

## 2022-11-27 RX ADMIN — ASPIRIN 81 MG 81 MG: 81 TABLET ORAL at 08:29

## 2022-11-27 RX ADMIN — ENOXAPARIN SODIUM 40 MG: 100 INJECTION SUBCUTANEOUS at 08:28

## 2022-11-27 RX ADMIN — ROSUVASTATIN 10 MG: 10 TABLET, FILM COATED ORAL at 08:29

## 2022-11-27 RX ADMIN — METOPROLOL TARTRATE 50 MG: 50 TABLET ORAL at 20:16

## 2022-11-27 RX ADMIN — AMLODIPINE BESYLATE 2.5 MG: 2.5 TABLET ORAL at 08:29

## 2022-11-27 RX ADMIN — METOPROLOL TARTRATE 75 MG: 50 TABLET, FILM COATED ORAL at 00:40

## 2022-11-27 RX ADMIN — ISOSORBIDE MONONITRATE 15 MG: 30 TABLET, EXTENDED RELEASE ORAL at 08:28

## 2022-11-27 ASSESSMENT — PAIN DESCRIPTION - ORIENTATION
ORIENTATION: LEFT
ORIENTATION: MID;UPPER

## 2022-11-27 ASSESSMENT — PAIN - FUNCTIONAL ASSESSMENT
PAIN_FUNCTIONAL_ASSESSMENT: ACTIVITIES ARE NOT PREVENTED
PAIN_FUNCTIONAL_ASSESSMENT: ACTIVITIES ARE NOT PREVENTED

## 2022-11-27 ASSESSMENT — PAIN SCALES - GENERAL
PAINLEVEL_OUTOF10: 2
PAINLEVEL_OUTOF10: 3

## 2022-11-27 ASSESSMENT — PAIN DESCRIPTION - DESCRIPTORS
DESCRIPTORS: BURNING
DESCRIPTORS: DISCOMFORT

## 2022-11-27 ASSESSMENT — PAIN DESCRIPTION - PAIN TYPE: TYPE: ACUTE PAIN

## 2022-11-27 ASSESSMENT — PAIN DESCRIPTION - LOCATION
LOCATION: BACK
LOCATION: ABDOMEN

## 2022-11-27 NOTE — PROGRESS NOTES
A&Ox4. Verbalized relief from chest pain after drinking Nitroglycerin. No pain or discomfort upon receiving. Will continue to monitor.

## 2022-11-27 NOTE — PROGRESS NOTES
Shift assessment completed. Pt A&O x4. VSS. AM medications admin whole per [facility] w/small sip of water. Pt NPO per order. Pt c/o slight discomfort in LLQ of abd. Pt ambulates to and from bathroom. Denies any needs at this time. Bed locked and in lowest position. Call light within reach. Will continue to monitor.  Electronically signed by Keerthi Gómez RN on 11/27/2022 at 4:14 PM

## 2022-11-27 NOTE — PLAN OF CARE
Problem: Pain  Goal: Verbalizes/displays adequate comfort level or baseline comfort level  11/27/2022 1612 by Loy Chery RN  Outcome: Progressing  11/27/2022 0741 by Anna Lopez RN  Outcome: Progressing     Problem: ABCDS Injury Assessment  Goal: Absence of physical injury  11/27/2022 1612 by Loy Chery RN  Outcome: Progressing  11/27/2022 0741 by Anna Lopez RN  Outcome: Progressing     Problem: Safety - Adult  Goal: Free from fall injury  11/27/2022 1612 by Loy Chery RN  Outcome: Progressing  11/27/2022 0741 by Anna Lopez RN  Outcome: Progressing  Flowsheets (Taken 11/27/2022 0021)  Free From Fall Injury: Instruct family/caregiver on patient safety

## 2022-11-27 NOTE — ED PROVIDER NOTES
[facility] Emergency Department    CHIEF COMPLAINT  Chest Pain (Pt to er with chest pain. Pt has history of  5 stents. . pt took 3 nitro with relief. . pt states felt like heart was racing)      [address] [facility]  I have seen and evaluated this patient with my supervising physician, Dr. Clarita Lowery. HISTORY OF PRESENT ILLNESS  Brad Toscano is a 80 y.o. female who presents to the ED complaining of 1 week history of dyspnea on exertion and 1 day history of exertional chest pain. Patient brought in by daughter for evaluation. Patient reports that she has been fatigued with some shortness of breath over the past week. Today was downstairs doing some laundry and after climbing steps developed some chest discomfort and shortness of breath. States that she took her blood pressure which was elevated at 190/80. At that time she took 3 nitroglycerin over 30-minute period. States that chest pain and shortness of breath have resolved although blood pressure remains elevated. She has had no associated headache, lightheadedness, dizziness or confusion. No neck, arm, jaw or back pain. Denies abdominal pain. No nausea or vomiting. No diaphoresis. No leg pain or swelling. No recent travel, trauma or surgery. Denies pain with deep inspiration. History of CAD with stents in place. No recent cardiac evaluation. No other complaints, modifying factors or associated symptoms. Nursing notes reviewed.    Past Medical History:   Diagnosis Date    Arthritis     ASHD (arteriosclerotic heart disease)     s/p 5 stents placed     Breast cancer ([address] 2004    left s/p lumpectomy and radiation    Hyperlipidemia     Hypertension      Past Surgical History:   Procedure Laterality Date    BREAST LUMPECTOMY      left     SECTION      CORONARY ANGIOPLASTY WITH STENT PLACEMENT  2009    5 drug-eluting    DILATION AND CURETTAGE OF UTERUS  2017    hysteroscopy, myosure    EYE SURGERY Right 4/2/2013    cataract w/ IOL    EYE SURGERY Left 5/21/13    cataract with IOL implant    TONSILLECTOMY       Family History   Problem Relation Age of Onset    Cancer Mother 80        colon    Diabetes Brother     Heart Disease Brother     High Blood Pressure Brother      Social History     Socioeconomic History    Marital status:      Spouse name: Not on file    Number of children: Not on file    Years of education: Not on file    Highest education level: Not on file   Occupational History    Not on file   Tobacco Use    Smoking status: Never    Smokeless tobacco: Never   Vaping Use    Vaping Use: Never used   Substance and Sexual Activity    Alcohol use: No    Drug use: No    Sexual activity: Never   Other Topics Concern    Not on file   Social History Narrative    Grandson lives w/ her in house.  She is active and independent     Social Determinants of Health     Financial Resource Strain: Not on file   Food Insecurity: Not on file   Transportation Needs: Not on file   Physical Activity: Not on file   Stress: Not on file   Social Connections: Not on file   Intimate Partner Violence: Not on file   Housing Stability: Not on file     Current Facility-Administered Medications   Medication Dose Route Frequency Provider Last Rate Last Admin    hydrALAZINE (APRESOLINE) injection 10 mg  10 mg IntraVENous Q6H PRN MAIKOL Calixto - OLLIE        rosuvastatin (CRESTOR) tablet 10 mg  10 mg Oral Daily Travon Nath MD   10 mg at 11/27/22 0829    metoprolol tartrate (LOPRESSOR) tablet 75 mg  75 mg Oral BID Travon Nath MD   75 mg at 11/27/22 0040    isosorbide mononitrate (IMDUR) extended release tablet 15 mg  15 mg Oral Daily Travon Nath MD   15 mg at 11/27/22 2600    aspirin chewable tablet 81 mg  81 mg Oral Daily Travon Nath MD   81 mg at 11/27/22 0829    amLODIPine (NORVASC) tablet 2.5 mg  2.5 mg Oral Daily Travon Nath MD   2.5 mg at 11/27/22 0829    sodium chloride flush 0.9 % injection 10 mL  10 mL IntraVENous 2 times per day Heide Pacheco MD        sodium chloride flush 0.9 % injection 10 mL  10 mL IntraVENous PRN Heide Pacheco MD        0.9 % sodium chloride infusion   IntraVENous PRN Heide Pacheco MD        enoxaparin (LOVENOX) injection 40 mg  40 mg SubCUTAneous Daily Heide Pacheco MD   40 mg at 11/27/22 0828    polyethylene glycol (GLYCOLAX) packet 17 g  17 g Oral Daily PRN Heide Pacheco MD        acetaminophen (TYLENOL) tablet 650 mg  650 mg Oral Q4H PRN Heide Pacheco MD        Or    acetaminophen (TYLENOL) suppository 650 mg  650 mg Rectal Q4H PRN Heide Pacheco MD        0.9 % sodium chloride infusion   IntraVENous Continuous Heide Pacheco MD 50 mL/hr at 11/27/22 0040 New Bag at 11/27/22 0040    promethazine (PHENERGAN) tablet 12.5 mg  12.5 mg Oral Q6H PRN Heide Pacheco MD         Allergies   Allergen Reactions    Carbocaine [Mepivacaine Hcl] Other (See Comments)     Dizzy, almost pass out    Ciprofloxacin     Metronidazole Other (See Comments)    Prevnar 13 [Pneumococcal 13-Louisa Conj Vacc] Swelling     Localized reaction    Zofran [Ondansetron Hcl] Other (See Comments)     Pt states it caused her stomach to \"burn\"       REVIEW OF SYSTEMS  10 systems reviewed, pertinent positives per HPI otherwise noted to be negative    PHYSICAL EXAM  BP (!) 172/78   Pulse 62   Temp 97.6 °F (36.4 °C) (Oral)   Resp 16   Ht 5' 5\" (1.651 m)   Wt 145 lb (65.8 kg)   SpO2 92%   BMI 24.13 kg/m²   GENERAL APPEARANCE: Awake and alert. Cooperative. No acute distress. HEAD: Normocephalic. Atraumatic. EYES: PERRL. EOM's grossly intact. ENT: Mucous membranes are moist.   NECK: Supple. No JVD. No tracheal tenderness or deviation. No crepitus. HEART: RRR. No murmurs. No chest wall tenderness. LUNGS: Respirations unlabored. CTAB. Good air exchange. Speaking comfortably in full sentences. No wheezing, rhonchi, rales. ABDOMEN: Soft. Non-distended. Non-tender.  No guarding or rebound. No midline pulsatile mass. EXTREMITIES: No peripheral edema. No unilateral calf pain, redness or swelling. Moves all extremities equally. All extremities neurovascularly intact. SKIN: Warm and dry. No acute rashes. NEUROLOGICAL: Alert and oriented. CN's 2-12 intact. No gross facial drooping. Strength 5/5, sensation intact. PSYCHIATRIC: Normal mood and affect. RADIOLOGY  XR CHEST PORTABLE    Result Date: 11/26/2022  EXAMINATION: ONE XRAY VIEW OF THE CHEST 11/26/2022 5:37 pm COMPARISON: 06/22/2011. HISTORY: ORDERING SYSTEM PROVIDED HISTORY: chest pain TECHNOLOGIST PROVIDED HISTORY: Reason for exam:->chest pain Reason for Exam: Chest Pain (Pt to er with chest pain. Pt has history of  5 stents. . pt took 3 nitro with relief. . pt states felt like heart was racing) FINDINGS: The heart size is within normal limits. The pulmonary vasculature is also within normal limits. No acute infiltrates are seen. No pneumothoraces are noted. There is chronic elevation of the right hemidiaphragm. 1. No active pulmonary disease. ED COURSE  chest pain not currently present. Patient was given dose of aspirin. Triage vitals showing hypertension 184/87. CBC without leukocytosis or anemia. CMP unremarkable. Urinalysis without proteinuria. No evidence for infectious process. Troponin less than 0.01. BNP approximately 250. Stable portable chest x-ray. A EKG consistent with normal sinus rhythm. Nonspecific ST wave changes noted. No significant changes from prior tracings. Given exertional chest pain and history of CAD we are at this time recommending admission. Case discussed with hospital medicine and orders placed. Discussion was had with MsNasrin Alex Michael regarding chest pain and shortness of breath, ED findings, and recommendations for admission. Risk management discussed and shared decision making had with patient and/or surrogate. All questions were answered. Patient is in agreement.     MDM  Results for orders placed or performed during the hospital encounter of 11/26/22   Comprehensive Metabolic Panel w/ Reflex to MG   Result Value Ref Range    Sodium 136 136 - 145 mmol/L    Potassium reflex Magnesium 4.0 3.5 - 5.1 mmol/L    Chloride 103 99 - 110 mmol/L    CO2 23 21 - 32 mmol/L    Anion Gap 10 3 - 16    Glucose 109 (H) 70 - 99 mg/dL    BUN 15 7 - 20 mg/dL    Creatinine 0.7 0.6 - 1.2 mg/dL    Est, Glom Filt Rate >60 >60    Calcium 10.5 8.3 - 10.6 mg/dL    Total Protein 6.6 6.4 - 8.2 g/dL    Albumin 3.9 3.4 - 5.0 g/dL    Albumin/Globulin Ratio 1.4 1.1 - 2.2    Total Bilirubin 0.4 0.0 - 1.0 mg/dL    Alkaline Phosphatase 69 40 - 129 U/L    ALT 16 10 - 40 U/L    AST 28 15 - 37 U/L   CBC with Auto Differential   Result Value Ref Range    WBC 6.7 4.0 - 11.0 K/uL    RBC 5.25 (H) 4.00 - 5.20 M/uL    Hemoglobin 15.5 12.0 - 16.0 g/dL    Hematocrit 46.0 36.0 - 48.0 %    MCV 87.7 80.0 - 100.0 fL    MCH 29.5 26.0 - 34.0 pg    MCHC 33.6 31.0 - 36.0 g/dL    RDW 14.2 12.4 - 15.4 %    Platelets 011 357 - 819 K/uL    MPV 9.9 5.0 - 10.5 fL    Neutrophils % 65.3 %    Lymphocytes % 18.4 %    Monocytes % 9.7 %    Eosinophils % 5.8 %    Basophils % 0.8 %    Neutrophils Absolute 4.4 1.7 - 7.7 K/uL    Lymphocytes Absolute 1.2 1.0 - 5.1 K/uL    Monocytes Absolute 0.7 0.0 - 1.3 K/uL    Eosinophils Absolute 0.4 0.0 - 0.6 K/uL    Basophils Absolute 0.1 0.0 - 0.2 K/uL   Troponin   Result Value Ref Range    Troponin <0.01 <0.01 ng/mL   Urinalysis   Result Value Ref Range    Color, UA Straw Straw/Yellow    Clarity, UA Clear Clear    Glucose, Ur Negative Negative mg/dL    Bilirubin Urine Negative Negative    Ketones, Urine Negative Negative mg/dL    Specific Gravity, UA <=1.005 1.005 - 1.030    Blood, Urine Negative Negative    pH, UA 5.5 5.0 - 8.0    Protein, UA Negative Negative mg/dL    Urobilinogen, Urine 0.2 <2.0 E.U./dL    Nitrite, Urine Negative Negative    Leukocyte Esterase, Urine Negative Negative    Microscopic Examination Not Indicated Urine Type NotGiven    Brain Natriuretic Peptide   Result Value Ref Range    Pro- 0 - 449 pg/mL   Troponin   Result Value Ref Range    Troponin <0.01 <0.01 ng/mL   Basic Metabolic Panel w/ Reflex to MG   Result Value Ref Range    Sodium 144 136 - 145 mmol/L    Potassium reflex Magnesium 4.6 3.5 - 5.1 mmol/L    Chloride 109 99 - 110 mmol/L    CO2 27 21 - 32 mmol/L    Anion Gap 8 3 - 16    Glucose 90 70 - 99 mg/dL    BUN 11 7 - 20 mg/dL    Creatinine 0.7 0.6 - 1.2 mg/dL    Est, Glom Filt Rate >60 >60    Calcium 10.3 8.3 - 10.6 mg/dL   CBC auto differential   Result Value Ref Range    WBC 7.0 4.0 - 11.0 K/uL    RBC 5.11 4.00 - 5.20 M/uL    Hemoglobin 15.4 12.0 - 16.0 g/dL    Hematocrit 45.5 36.0 - 48.0 %    MCV 88.9 80.0 - 100.0 fL    MCH 30.0 26.0 - 34.0 pg    MCHC 33.8 31.0 - 36.0 g/dL    RDW 14.3 12.4 - 15.4 %    Platelets 642 519 - 428 K/uL    MPV 9.6 5.0 - 10.5 fL    Neutrophils % 65.5 %    Lymphocytes % 19.3 %    Monocytes % 7.8 %    Eosinophils % 6.7 %    Basophils % 0.7 %    Neutrophils Absolute 4.6 1.7 - 7.7 K/uL    Lymphocytes Absolute 1.3 1.0 - 5.1 K/uL    Monocytes Absolute 0.5 0.0 - 1.3 K/uL    Eosinophils Absolute 0.5 0.0 - 0.6 K/uL    Basophils Absolute 0.1 0.0 - 0.2 K/uL   Troponin   Result Value Ref Range    Troponin <0.01 <0.01 ng/mL   EKG 12 Lead   Result Value Ref Range    Ventricular Rate 75 BPM    Atrial Rate 75 BPM    P-R Interval 192 ms    QRS Duration 92 ms    Q-T Interval 380 ms    QTc Calculation (Bazett) 424 ms    P Axis 29 degrees    R Axis -37 degrees    T Axis 8 degrees    Diagnosis       Normal sinus rhythmLeft axis deviationNonspecific ST abnormalityAbnormal ECGWhen compared with ECG of 14-MAY-2019 19:41,No significant change was foundConfirmed by LYDIA FRANCIS, [address] ([id number]) on 11/27/2022 6:35:34 AM     I spoke with Phillip Knight and Dalila Mcneil. We thoroughly discussed the history, physical exam, laboratory and imaging studies, as well as, emergency department course.  Based upon that discussion, we've decided to admit Rhiannon Armstrong to the hospital for further observation, evaluation, and treatment. Final Impression  1. Chest pain, unspecified type      Blood pressure (!) 172/78, pulse 62, temperature 97.6 °F (36.4 °C), temperature source Oral, resp. rate 16, height 5' 5\" (1.651 m), weight 145 lb (65.8 kg), SpO2 92 %, not currently breastfeeding. DISPOSITION  Patient was admitted to the hospital in stable condition.           Jessica Aguilar  11/27/22 7225

## 2022-11-27 NOTE — CARE COORDINATION
CASE MANAGEMENT INITIAL ASSESSMENT      Reviewed chart and completed assessment with patient:bedside  Family present: none  Explained Case Management role/services. Primary contact information:[facility] Decision Maker :   Primary Decision Maker: Brionna Hickman Child - [phone]    Secondary Decision Maker: Sandra Monroy - Grandchild - [phone]          Can this person be reached and be able to respond quickly, such as within a few minutes or hours? Yes      Admit date/status:11/26/22  Diagnosis:chest pain   Is this a Readmission?:  No      Insurance:medicare   Precert required for SNF: No       3 night stay required: Yes    Living arrangements, Adls, care needs, prior to admission:lives in ranch style home alone, 1STE    Durable Medical Equipment at home:  Walker__Cane__RTS__ BSC__Shower Chair__  02__ HHN__ CPAP__  BiPap__  Hospital Bed__ W/C___ Other_____    Services in the home and/or outpatient, prior to admission:none    Current PCP:Nathen                                Medications Prescription coverage? yes  Transportation needs: none       PT/OT recs:none    Hospital Exemption Notification (HEN):needed for SNf    Barriers to discharge:none    Plan/comments:spoke with patient. Plans on returning home at discharge. Reported IPTA and drives. Has support of daughter in needs should arise. Please notify if needed.  Nicole Brown RN       [facility] on chart for MD kasper

## 2022-11-27 NOTE — CONSULTS
[address] [facility]  [phone]      Attending Physician: Pretty Tay MD  Reason for Consultation/Chief Complaint: Chest pain    Subjective   History of Present Illness:  Cherie Guido is a 80 y.o. female with a history of CAD s/p remote PCI's to proximal to mid-LAD and proximal/mid/distal RCA, essential hypertension, hyperlipidemia, and breast cancer who presented with chest pain. The patient reports that yesterday when walking up the stairs in her home she became short of breath. She sat down in a chair to rest, and shortly after this developed burning mid-sternal chest pain. She took 3 SL nitroglycerin tablets, after which she says that her chest pain improved. She says that the pain that she experienced yesterday was not as intense as what she experienced during her previous MIs. On presentation to the ED, the patient was markedly hypertensive with BP of 184/87, heart rate 73 bpm, and oxygen saturation of 92% on room air. Initial EKG showed normal sinus rhythm with left axis deviation, poor R wave progression in the precordial leads, and nonspecific ST abnormality. Troponin T was negative x3. Chest x-ray was negative for any acute pulmonary abnormality. She follows with cardiology at [facility] and last underwent invasive coronary angiography in 3/2018 which demonstrated non-obstructive coronary artery disease with patent proximal to mid-LAD and patent proximal/mid/distal RCA stents. Her last TTE from [facility] in 2018 showed an LVEF of 60-65% with normal wall motion, mild-moderate concentric LVH, grade 1 diastolic dysfunction, normal RV size and systolic function, mild aortic regurgitation, and borderline aortic root dilatation. Past Medical History:   has a past medical history of Arthritis, ASHD (arteriosclerotic heart disease), Breast cancer ([address] Hyperlipidemia, and Hypertension.     Surgical History:   has a past surgical history that includes Coronary angioplasty with stent (2009);  section; Breast lumpectomy (); Tonsillectomy; eye surgery (Right, 2013); eye surgery (Left, 13); and Dilation and curettage of uterus (2017). Social History:   reports that she has never smoked. She has never used smokeless tobacco. She reports that she does not drink alcohol and does not use drugs. Family History:  family history includes Cancer (age of onset: 80) in her mother; Diabetes in her brother; Heart Disease in her brother; High Blood Pressure in her brother. Home Medications:  Were reviewed and are listed in nursing record and/or below  Prior to Admission medications    Medication Sig Start Date End Date Taking? Authorizing Provider   isosorbide mononitrate (IMDUR) 30 MG extended release tablet Take 15 mg by mouth  3/20/18   Historical Provider, MD   aspirin 81 MG tablet Take 81 mg by mouth daily    Historical Provider, MD   amLODIPine (NORVASC) 2.5 MG tablet Take 2.5 mg by mouth daily. Historical Provider, MD   metoprolol (LOPRESSOR) 50 MG tablet Take 50 mg by mouth 2 times daily. Historical Provider, MD   rosuvastatin (CRESTOR) 5 MG tablet Take 1 tablet by mouth daily.  9/24/10   REBEKA Cruz        CURRENT Medications:  hydrALAZINE (APRESOLINE) injection 10 mg, Q6H PRN  rosuvastatin (CRESTOR) tablet 10 mg, Daily  metoprolol tartrate (LOPRESSOR) tablet 75 mg, BID  isosorbide mononitrate (IMDUR) extended release tablet 15 mg, Daily  aspirin chewable tablet 81 mg, Daily  amLODIPine (NORVASC) tablet 2.5 mg, Daily  sodium chloride flush 0.9 % injection 10 mL, 2 times per day  sodium chloride flush 0.9 % injection 10 mL, PRN  0.9 % sodium chloride infusion, PRN  enoxaparin (LOVENOX) injection 40 mg, Daily  polyethylene glycol (GLYCOLAX) packet 17 g, Daily PRN  acetaminophen (TYLENOL) tablet 650 mg, Q4H PRN   Or  acetaminophen (TYLENOL) suppository 650 mg, Q4H PRN  0.9 % sodium chloride infusion, Continuous  promethazine (PHENERGAN) tablet 12.5 mg, Q6H PRN        Allergies:  Carbocaine [mepivacaine hcl], Ciprofloxacin, Metronidazole, Prevnar 13 [pneumococcal 13-christina conj vacc], and Zofran [ondansetron hcl]     Review of Systems:   A 14 point review of symptoms was completed. Pertinent positives were identified in the HPI. All other review of symptoms negative unless otherwise noted below. Objective   PHYSICAL EXAM:    Vitals:    11/27/22 1115   BP: 134/72   Pulse: 64   Resp: 16   Temp: 98.1 °F (36.7 °C)   SpO2: 91%    Weight: 145 lb (65.8 kg)       General: Elderly female in no acute distress. Pleasant and interactive on exam.  HEENT: Normocephalic, atraumatic, non-icteric, hearing intact, nares normal, mucous membranes moist.  Neck: Supple, trachea midline. No adenopathy. No thyromegaly. No JVD. Heart: Regular rate and rhythm. Normal S1 and S2. No murmurs, gallops or rubs. Lungs: Normal respiratory effort. Clear to auscultation bilaterally. No wheezes, rales, or rhonchi. Abdomen: Soft, non-tender. Normoactive bowel sounds. No masses or organomegaly. Skin: No rashes, wounds, or lesions. Pulses: 2+ and symmetric. Extremities: No clubbing, cyanosis, or edema. Musculoskeletal: Spontaneously moves all four extremities. Psych: Normal mood and affect. Neuro: Alert and oriented to person, place, and time. No focal deficits noted.       Labs   CBC:   Lab Results   Component Value Date/Time    WBC 7.0 11/27/2022 04:41 AM    RBC 5.11 11/27/2022 04:41 AM    HGB 15.4 11/27/2022 04:41 AM    HCT 45.5 11/27/2022 04:41 AM    MCV 88.9 11/27/2022 04:41 AM    RDW 14.3 11/27/2022 04:41 AM     11/27/2022 04:41 AM     CMP:  Lab Results   Component Value Date/Time     11/27/2022 04:41 AM    K 4.6 11/27/2022 04:41 AM     11/27/2022 04:41 AM    CO2 27 11/27/2022 04:41 AM    BUN 11 11/27/2022 04:41 AM    CREATININE 0.7 11/27/2022 04:41 AM    GFRAA >60 10/09/2022 06:08 AM    GFRAA >60 01/11/2013 10:50 AM    AGRATIO 1.4 11/26/2022 05:15 PM    LABGLOM >60 11/27/2022 04:41 AM    GLUCOSE 90 11/27/2022 04:41 AM    PROT 6.6 11/26/2022 05:15 PM    PROT 7.1 01/11/2013 10:50 AM    CALCIUM 10.3 11/27/2022 04:41 AM    BILITOT 0.4 11/26/2022 05:15 PM    ALKPHOS 69 11/26/2022 05:15 PM    AST 28 11/26/2022 05:15 PM    ALT 16 11/26/2022 05:15 PM     PT/INR:  No results found for: PTINR  HgBA1c:  Lab Results   Component Value Date    LABA1C 5.1 07/20/2021     Lab Results   Component Value Date    TROPONINI <0.01 11/27/2022         Cardiac Data     Last EKG: Normal sinus rhythm, left axis deviation, non-specific ST abnormality, poor R wave progression in precordial leads    Echo:  TTE 3/15/18:  Summary:   Overall left ventricular ejection fraction is estimated to be 60-65%. Left ventricular systolic function is normal. (LVEF>/=55%)   The left ventricular wall motion is normal.   There is mild to moderate concentric left ventricular hypertrophy. The diastolic function is impaired and classified as Grade 1   (impaired relaxation). Borderline aortic root dilatation. A bicuspid aortic valve cannot be excluded. Mild aortic regurgitation. The mitral valve leaflets are moderately thickened in appearance. Stress Test:  Nuclear SPECT Stress 3/15/18: IMPRESSION:       Normal nuclear myocardial perfusion scan     Cath:  [facility] 3/19/18:  Findings: Aortic Pressure: 102/50 mmHg. Left Ventricle:  LVEDP=10 No aortic valve gradient seen. Coronary angiogram:   Left Main Trunk (LMT): distal 20% stenosis   Left Anterior Descending (LAD): proximal 30% stenosis proximal to   stent, patent prox/mid stent, mid-distal bridge with systolic   compressions   Left Circumflex (LCX): ostial 20% stenosis, proximal 40-50%   stenosis   Right Coronary (RCA): dominant vessel, multiple patent   prox/mid/distal stents     Other Studies:   Chest x-ray 11/26/22:  1. No active pulmonary disease. Assessment and Plan      1.   Chest pain/exertional dyspnea - Symptoms could possibly represent progressive angina in a patient with known CAD. She has ruled out for acute coronary syndrome and reports that her chest pain has now resolved. -Please keep n.p.o. after midnight and will arrange for pharmacologic nuclear SPECT stress test for further cardiac risk stratification tomorrow (patient reports that she previously did not tolerate exercising on treadmill)  -Will obtain TTE to re-evaluate cardiac structure and function  -Continue GDMT for CAD as discussed further below  -Continue to monitor on telemetry and repeat EKG for any rhythm changes or new episodes of chest    2. CAD - s/p remote PCI's to proximal to mid-LAD and proximal/mid/distal RCA. -Increase imdur to 30 gm daily to further optimize antianginal therapy  -Continue aspirin 81 mg daily, rosuvastatin 10 mg daily, and metoprolol tartrate 50 mg twice daily    3. Uncontrolled hypertension - BP was as high as 186/99 earlier in admission and is currently 150/69.  -Recommend increasing amlodipine to 5 mg daily and Imdur to 30 mg daily    4. Hyperlipidemia  -Continue statin      Thank you for allowing us to participate in the care of Bernard Calderon. Please call me with any questions [phone]. Darcy Au DO  [facility], [place], operated by [facility]  [phone] [facility]  [phone] [address] [place] [address]  11/27/2022 1:03 PM      I will address the patient's cardiac risk factors and adjusted pharmacologic treatment as needed. In addition, I have reinforced the need for patient directed risk factor modification. All questions and concerns were addressed to the patient/family. Alternatives to my treatment were discussed. The note was completed using EMR. Every effort was made to ensure accuracy; however, inadvertent computerized transcription errors may be present.

## 2022-11-27 NOTE — ED PROVIDER NOTES
When trying to walk up the steps today around 4:10 PM somewhat similar to the pain I independently performed a history and physical on Janice Cisneros. All diagnostic, treatment, and disposition decisions were made by myself in conjunction with the advanced practice provider. I personally saw the patient and performed a substantive portion of the visit including all aspects of the medical decision making. For further details of [facility] emergency department encounter, please see REBEKA Cabrera's documentation. Patient is a 80-year-old female presenting today after developing significant shortness of breath when trying to walk up the steps around 4:10 PM associated with pain and somewhat similar to whenever she needed coronary stents in the past.  She did have some shortness of breath at that time. She felt like her heart was racing as well. She ultimately took 3 nitroglycerin and following this her pain resolved. Due to concern for significant cardiac history and concerning chest pain, although her pain has resolved at this time, she came to the emergency department for further evaluation. She felt fine earlier in the day. No reported vomiting or diarrhea. No falls or trauma. No numbness or weakness in the arms or legs. No fever or COVID concerns. Physical:   Gen: No acute distress. AOx3. Psych: Normal mood and affect  HEENT: NCAT  Neck: supple  Cardiac: RRR, pulses 2+ in upper extremities  Lungs: C2AB, no R/R/W  Abdomen: soft and nontender with no R/D/G  Neuro: Moving all extremities equally, GCS equals 15      The Ekg interpreted by me shows  normal sinus rhythm with a rate of 75  Axis is   Left axis deviation  QTc is  normal  Intervals and Durations are unremarkable. ST Segments: no acute change and nonspecific changes  No significant change from prior EKG dated - 5/14/19  No STEMI     I was the primary provider for the patient along with physician assistant Emani Garcia.     MDM: Patient was evaluated due to significant chest pain associate with shortness of breath earlier today that started after exertion and ultimately resolved with nitroglycerin. She does have significant cardiac history and denies any recent cardiac evaluation. She is currently pain-free. Based on concerning story, she would benefit from further evaluation in the hospital with cardiology consultation. She is stable for the floor with telemetry. She denies any pain with breathing and story not suggestive of pulmonary embolism. Initial troponin was negative.      Paulie Houston MD  11/28/22 1861 Cyndi Jeffers MD  11/28/22 [id number]

## 2022-11-27 NOTE — PROGRESS NOTES
4 Eyes Skin Assessment     The patient is being assess for  Shift Handoff    I agree that 2 RN's have performed a thorough Head to Toe Skin Assessment on the patient. ALL assessment sites listed below have been assessed. Areas assessed by both nurses: CHRISTI BONE/KALPANA RN  [x]   Head, Face, and Ears   [x]   Shoulders, Back, and Chest  [x]   Arms, Elbows, and Hands   [x]   Coccyx, Sacrum, and IschIum  [x]   Legs, Feet, and Heels  Scattered ecchymosis        Does the Patient have Skin Breakdown?   No         Ja Prevention initiated:  No   Wound Care Orders initiated:  No      [facility] nurse consulted for Pressure Injury (Stage 3,4, Unstageable, DTI, NWPT, and Complex wounds), New and Established Ostomies:  No      Nurse 1 eSignature: Electronically signed by Benja Walsh RN on 11/27/22 at 6:01 AM EST    **SHARE this note so that the co-signing nurse is able to place an eSignature**    Nurse 2 eSignature: Electronically signed by Heide Lin RN on 11/27/22 at 7:57 AM EST

## 2022-11-27 NOTE — PLAN OF CARE
Problem: Pain  Goal: Verbalizes/displays adequate comfort level or baseline comfort level  Outcome: Progressing     Problem: ABCDS Injury Assessment  Goal: Absence of physical injury  Outcome: Progressing     Problem: Safety - Adult  Goal: Free from fall injury  Outcome: Progressing  Flowsheets (Taken 11/27/2022 0021)  Free From Fall Injury: Instruct family/caregiver on patient safety

## 2022-11-27 NOTE — H&P
[facility] Medicine History & Physical      PCP: Lieutenant Tenisha MD    Date of Admission: 2022    Chief Complaint:  Dyspnea       History Of Present Illness:      80 y.o. female who presents to the ED complaining of 1 week history of dyspnea on exertion and 1 day history of exertional chest pain. Patient brought in by daughter for evaluation. Patient reports that she has been fatigued with some shortness of breath over the past week. Today was downstairs doing some laundry and after climbing steps developed some chest discomfort and shortness of breath. States that she took her blood pressure which was elevated at 190/80. At that time she took 3 nitroglycerin over 30-minute period. States that chest pain and shortness of breath have resolved although blood pressure remains elevated. She has had no associated headache, lightheadedness, dizziness or confusion. No neck, arm, jaw or back pain. Denies abdominal pain. No nausea or vomiting. No diaphoresis. No leg pain or swelling. No recent travel, trauma or surgery. Denies pain with deep inspiration. History of CAD with stents in place. No recent cardiac evaluation. Past Medical History:          Diagnosis Date    Arthritis     ASHD (arteriosclerotic heart disease)     s/p 5 stents placed     Breast cancer ([address] 2004    left s/p lumpectomy and radiation    Hyperlipidemia     Hypertension        Past Surgical History:          Procedure Laterality Date    BREAST LUMPECTOMY  2004    left     SECTION      CORONARY ANGIOPLASTY WITH STENT PLACEMENT  2009    5 drug-eluting    DILATION AND CURETTAGE OF UTERUS  2017    hysteroscopy, myosure    EYE SURGERY Right 2013    cataract w/ IOL    EYE SURGERY Left 13    cataract with IOL implant    TONSILLECTOMY         Medications Prior to Admission:      Prior to Admission medications    Medication Sig Start Date End Date Taking?  Authorizing Provider   isosorbide mononitrate (IMDUR) 30 MG extended release tablet Take 15 mg by mouth  3/20/18   Historical Provider, MD   aspirin 81 MG tablet Take 81 mg by mouth daily    Historical Provider, MD   amLODIPine (NORVASC) 2.5 MG tablet Take 2.5 mg by mouth daily. Historical Provider, MD   metoprolol (LOPRESSOR) 50 MG tablet Take 50 mg by mouth 2 times daily. Historical Provider, MD   rosuvastatin (CRESTOR) 5 MG tablet Take 1 tablet by mouth daily. 9/24/10   REBEKA Pate       Allergies:  Carbocaine [mepivacaine hcl], Ciprofloxacin, Metronidazole, Prevnar 13 [pneumococcal 13-christina conj vacc], and Zofran [ondansetron hcl]    Social History:      TOBACCO:   reports that she has never smoked. She has never used smokeless tobacco.  ETOH:   reports no history of alcohol use. E-cigarette/Vaping       Questions Responses    E-cigarette/Vaping Use Never User    Start Date     Passive Exposure     Quit Date     Counseling Given     Comments               Family History:     Reviewed and negative in regards to presenting illness/complaint. Problem Relation Age of Onset    Cancer Mother 80        colon    Diabetes Brother     Heart Disease Brother     High Blood Pressure Brother        REVIEW OF SYSTEMS COMPLETED:   Pertinent positives as noted in the HPI. All other systems reviewed and negative. PHYSICAL EXAM PERFORMED:    /72   Pulse 64   Temp 98.1 °F (36.7 °C) (Oral)   Resp 16   Ht 5' 5\" (1.651 m)   Wt 145 lb (65.8 kg)   SpO2 91%   BMI 24.13 kg/m²     General appearance:  No apparent distress, appears stated age and cooperative. HEENT:  Normal cephalic, atraumatic without obvious deformity. Pupils equal, round, and reactive to light. Extra ocular muscles intact. Conjunctivae/corneas clear. Neck: Supple, with full range of motion. No jugular venous distention. Trachea midline. Respiratory:  Normal respiratory effort. Clear to auscultation, bilaterally without Rales/Wheezes/Rhonchi.   Cardiovascular:  Regular rate and rhythm with normal S1/S2 without murmurs, rubs or gallops. Abdomen: Soft, non-tender, non-distended with normal bowel sounds. Musculoskeletal:  No clubbing, cyanosis or edema bilaterally. Full range of motion without deformity. Skin: Skin color, texture, turgor normal.  No rashes or lesions. Neurologic:  Neurovascularly intact without any focal sensory/motor deficits. Cranial nerves: II-XII intact, grossly non-focal.  Psychiatric:  Alert and oriented, thought content appropriate, normal insight  Capillary Refill: Brisk,3 seconds, normal  Peripheral Pulses: +2 palpable, equal bilaterally       Labs:     Recent Labs     11/26/22 1715 11/27/22 0441   WBC 6.7 7.0   HGB 15.5 15.4   HCT 46.0 45.5    148     Recent Labs     11/26/22 1715 11/27/22 0441    144   K 4.0 4.6    109   CO2 23 27   BUN 15 11   CREATININE 0.7 0.7   CALCIUM 10.5 10.3     Recent Labs     11/26/22 1715   AST 28   ALT 16   BILITOT 0.4   ALKPHOS 69     No results for input(s): INR in the last 72 hours. Recent Labs     11/26/22  2133 11/27/22  0441 11/27/22  0859   TROPONINI <0.01 <0.01 <0.01       Urinalysis:      Lab Results   Component Value Date/Time    NITRU Negative 11/26/2022 06:10 PM    [address] 03/31/2022 06:20 PM    RBCUA None seen 03/31/2022 06:20 PM    BLOODU Negative 11/26/2022 06:10 PM    SPECGRAV <=1.005 11/26/2022 06:10 PM    GLUCOSEU Negative 11/26/2022 06:10 PM       Radiology:     CXR: I have reviewed the CXR   EKG:  I have reviewed the EKG     XR CHEST PORTABLE   Final Result   1. No active pulmonary disease. Consults:    IP CONSULT TO HOSPITALIST  IP CONSULT TO CARDIOLOGY    ASSESSMENT:    Active Hospital Problems    Diagnosis Date Noted    Chest pain [R07.9] 11/26/2022     Priority: Medium     -Angina at rest  -CAD s/p 5 stents  -Dyspnea. Normal pro BNP.    -Essential hypertension   -Dyslipidemia   -Left sided breast cancer s/p lumpectomy and radiation     PLAN:    Admit telemetry Trend troponin  Po aspirin 81 mg qd  SL nitrogycerine prn   Po rosuvastatin 10 mg qd  PO metoprolol   Obtain TTE  Obtain Cardiology consult  C/w home meds       DVT Prophylaxis: lovenox   Diet: Diet NPO Exceptions are: Ice Chips, Sips of Water with Meds  Code Status: Full Code    PT/OT Eval Status: yes    Dispo - Home after cardiac eval        David Doan MD    Thank you Fran Diop MD for the opportunity to be involved in this patient's care. If you have any questions or concerns please feel free to contact me at [phone].

## 2022-11-27 NOTE — CONSULTS
Consult placed    Who:Dr. Mccormick  Date:11/27/2022,  Time:8:25 AM        Electronically signed by Adair Dudley on 11/27/2022 at 8:25 AM

## 2022-11-27 NOTE — PROGRESS NOTES
Physical Therapy  Facility/Department: [place] [address] TELE/MED SURG/ONC  Physical Therapy Initial Assessment, Treatment and Discharge Summary    Name: Rhiannon Armstrong  : 1932  MRN: [id number]  Date of Service: 2022    Discharge Recommendations:  Home independently   PT Equipment Recommendations  Equipment Needed: No      Patient Diagnosis(es): The encounter diagnosis was Chest pain, unspecified type. Past Medical History:  has a past medical history of Arthritis, ASHD (arteriosclerotic heart disease), [address] [address] Hyperlipidemia, and Hypertension. Past Surgical History:  has a past surgical history that includes Coronary angioplasty with stent (2009);  section; Breast lumpectomy (); Tonsillectomy; eye surgery (Right, 2013); eye surgery (Left, 13); and Dilation and curettage of uterus (2017). Assessment   Assessment: Patient is a 80year old female who presented to [facility] on 22 with chest pain and dyspnea. Patient reports that her symptoms have resolved. Patient reports that she is normally independent with all functional mobility. Patient appears to have returned to her prior independent level of function. Today she ambulated 150 feet without an assistive device and modified independence. She also ascended 4 stairs with a rail and modified independence. Patient is safe for home, when medically stable. Patient has met all of her acute PT goals. No additional skilled acute PT needs. PT signing off. Treatment Diagnosis: Decreased independence with functional mobility  Specific Instructions for Next Treatment: N/A PT signing off.   Therapy Prognosis: Excellent  Decision Making: Low Complexity  Barriers to Learning: nonw  No Skilled PT: Independent with functional mobility   Requires PT Follow-Up: No  Activity Tolerance  Activity Tolerance: Patient tolerated evaluation without incident;Patient tolerated treatment well  Activity Tolerance Comments: post activity: 172/77 93% on room air 70 BPM.     Plan   Physcial Therapy Plan  General Plan: Discharge  Specific Instructions for Next Treatment: N/A PT signing off. Safety Devices  Type of Devices: All fall risk precautions in place, Call light within reach, Gait belt, Nurse notified, Left in bed (alarm was not activated upon entry of therapy)     Restrictions  Restrictions/Precautions  Restrictions/Precautions: Fall Risk, General Precautions  Position Activity Restriction  Other position/activity restrictions: Up with assist.     Subjective   Pain: No complaints of pain. General  Chart Reviewed: Yes  Patient assessed for rehabilitation services?: Yes  Additional Pertinent Hx: HPI per chart, \"80 y.o. female who presents to the ED complaining of 1 week history of dyspnea on exertion and 1 day history of exertional chest pain. Patient brought in by daughter for evaluation. Patient reports that she has been fatigued with some shortness of breath over the past week. Today was downstairs doing some laundry and after climbing steps developed some chest discomfort and shortness of breath. States that she took her blood pressure which was elevated at 190/80. At that time she took 3 nitroglycerin over 30-minute period. States that chest pain and shortness of breath have resolved although blood pressure remains elevated. She has had no associated headache, lightheadedness, dizziness or confusion. No neck, arm, jaw or back pain. Denies abdominal pain. No nausea or vomiting. No diaphoresis. No leg pain or swelling. No recent travel, trauma or surgery. Denies pain with deep inspiration. History of CAD with stents in place. No recent cardiac evaluation. \"  Response To Previous Treatment: Not applicable  Family / Caregiver Present: No  Referring Practitioner: Jannette Martinez MD  Referral Date : 11/27/22  Follows Commands: Within Functional Limits  General Comment  Comments: Supine in bed upon entry of therapy staff.  Cleared for therapy by RN.  Subjective  Subjective: Patient agreed to participate. Social/Functional History  Social/Functional History  Lives With: Alone  Type of Home: House  Home Layout: One level  Home Access: Stairs to enter without rails  Entrance Stairs - Number of Steps: 1  Bathroom Shower/Tub: Walk-in shower  Bathroom Toilet: Handicap height  Bathroom Equipment:  (No DME)  Home Equipment: Cane (Has cane but dont really use it.)  Has the patient had two or more falls in the past year or any fall with injury in the past year?: No  ADL Assistance: Independent  Homemaking Assistance: Independent  Homemaking Responsibilities: Yes  Meal Prep Responsibility: Primary  Laundry Responsibility: Primary  Cleaning Responsibility: Primary  Shopping Responsibility: Primary  Ambulation Assistance: Independent  Transfer Assistance: Independent  Active : Yes  Occupation: Retired  Type of Occupation: [employer]  Additional Comments: Patient's daughter is available to assist as needed. She is retired. \"I recently asked her to help with mowing the lawn. \"  Vision/Hearing  Vision  Vision: Impaired  Vision Exceptions: Wears glasses at all times; Wears glasses for reading  Hearing  Hearing: Exceptions to [facility]  Hearing Exceptions: Hard of hearing/hearing concerns    Cognition   Orientation  Overall Orientation Status: Within Functional Limits  Cognition  Overall Cognitive Status: WFL     Objective   Heart Rate: 67  Heart Rate Source: Monitor  BP: (!) 150/69  BP Location: Left upper arm  BP Method: Automatic  Patient Position: Sitting  MAP (Calculated): 96  Resp: 16  SpO2: 98 %  O2 Device: None (Room air)  Comment: post activity: 172/77 70 BPM 93% on room air. Observation/Palpation  Posture: Good  Gross Assessment  AROM: Within functional limits  PROM: Within functional limits  Strength:  Within functional limits  Sensation: Intact                 Bed Mobility Training  Bed Mobility Training: Yes  Overall Level of Assistance: Modified independent (head of bed elevated)  Rolling: Modified independent  Supine to Sit: Modified independent  Sit to Supine: Modified independent  Scooting: Modified independent  Balance  Sitting: Intact  Standing: Intact  Transfer Training  Transfer Training: Yes  Overall Level of Assistance: Independent  Sit to Stand: Independent  Stand to Sit: Independent  Bed to Chair: Independent  Gait Training  Gait Training: Yes  Gait  Overall Level of Assistance: Modified independent  Interventions: Safety awareness training  Base of Support: Center of gravity altered;Narrowed  Speed/Missy: Pace decreased (< 100 feet/min); Slow  Step Length: Right shortened;Left shortened  Gait Abnormalities: Decreased step clearance  Distance (ft): 150 Feet (No complaints of shortness of breath, chest pain or dizziness. No loss of balance.)  Assistive Device: Gait belt  Rail Use: Left  Stairs - Level of Assistance: Modified independent  Number of Stairs Trained: 4 (No complaints of shortness of breath, chest pain or dizziness. No loss of balance. Safe reciprocal step pattern.)              Balance  Posture: Fair  Sitting - Static: Good  Sitting - Dynamic: Good  Standing - Static: Good  Standing - Dynamic: Good  Comments: no assistive device         AM-PAC Score  -PAC Inpatient Mobility Raw Score : 24 (11/27/22 1547)  AM-PAC Inpatient T-Scale Score : 61.14 (11/27/22 1547)  Mobility Inpatient CMS 0-100% Score: 0 (11/27/22 1547)  Mobility Inpatient CMS G-Code Modifier : [address] (11/27/22 1547)        Goals  Short Term Goals  Time Frame for Short Term Goals: 1 session 11/27/22  Short Term Goal 1: Patient will be independent with bed mobility, transfers. 11/27 Goal met  Short Term Goal 2: Patient will ambulate 150 feet with no assistive device and modified independence. 11/27 goal met. Short Term Goal 3: Patient will ascend 4 steps with rail and modified independence. 11/27 goal met. Patient Goals   Patient Goals : To go home.        Education  Patient Education  Education Given To: Patient  Education Provided: Role of Therapy;Plan of Care; Fall Prevention Strategies;Transfer Training  Education Provided Comments: Disease Specific Education: Patient educated on importance of out of bed mobility, prevention of complications of bedrest, and general safety (importance of using call bell) during hospitalization.  Patient verbalized understanding  Education Method: Demonstration;Verbal  Barriers to Learning: None  Education Outcome: Verbalized understanding;Demonstrated understanding      Therapy Time   Individual Concurrent Group Co-treatment   Time In 1501         Time Out 1520         Minutes 19         Timed Code Treatment Minutes: 9 Minutes (10 minute evaluation)       Iris Jane, PT

## 2022-11-28 ENCOUNTER — APPOINTMENT (OUTPATIENT)
Dept: NUCLEAR MEDICINE | Age: 87
DRG: 313 | End: 2022-11-28
Payer: MEDICARE

## 2022-11-28 VITALS
RESPIRATION RATE: 22 BRPM | TEMPERATURE: 97.3 F | HEART RATE: 84 BPM | SYSTOLIC BLOOD PRESSURE: 148 MMHG | WEIGHT: 145 LBS | BODY MASS INDEX: 24.16 KG/M2 | HEIGHT: 65 IN | OXYGEN SATURATION: 96 % | DIASTOLIC BLOOD PRESSURE: 77 MMHG

## 2022-11-28 LAB
ANION GAP SERPL CALCULATED.3IONS-SCNC: 9 MMOL/L (ref 3–16)
BASOPHILS ABSOLUTE: 0.1 K/UL (ref 0–0.2)
BASOPHILS RELATIVE PERCENT: 1.2 %
BUN BLDV-MCNC: 13 MG/DL (ref 7–20)
CALCIUM SERPL-MCNC: 10.3 MG/DL (ref 8.3–10.6)
CHLORIDE BLD-SCNC: 108 MMOL/L (ref 99–110)
CO2: 20 MMOL/L (ref 21–32)
CREAT SERPL-MCNC: 0.7 MG/DL (ref 0.6–1.2)
EOSINOPHILS ABSOLUTE: 0.4 K/UL (ref 0–0.6)
EOSINOPHILS RELATIVE PERCENT: 7.3 %
GFR SERPL CREATININE-BSD FRML MDRD: >60 ML/MIN/{1.73_M2}
GLUCOSE BLD-MCNC: 89 MG/DL (ref 70–99)
HCT VFR BLD CALC: 46.3 % (ref 36–48)
HEMOGLOBIN: 14.9 G/DL (ref 12–16)
LYMPHOCYTES ABSOLUTE: 1.2 K/UL (ref 1–5.1)
LYMPHOCYTES RELATIVE PERCENT: 18.9 %
MCH RBC QN AUTO: 29.5 PG (ref 26–34)
MCHC RBC AUTO-ENTMCNC: 32.2 G/DL (ref 31–36)
MCV RBC AUTO: 91.7 FL (ref 80–100)
MONOCYTES ABSOLUTE: 0.5 K/UL (ref 0–1.3)
MONOCYTES RELATIVE PERCENT: 7.5 %
NEUTROPHILS ABSOLUTE: 4 K/UL (ref 1.7–7.7)
NEUTROPHILS RELATIVE PERCENT: 65.1 %
PDW BLD-RTO: 14.4 % (ref 12.4–15.4)
PLATELET # BLD: 107 K/UL (ref 135–450)
PMV BLD AUTO: 10.9 FL (ref 5–10.5)
POTASSIUM REFLEX MAGNESIUM: 4.3 MMOL/L (ref 3.5–5.1)
RBC # BLD: 5.05 M/UL (ref 4–5.2)
SODIUM BLD-SCNC: 137 MMOL/L (ref 136–145)
WBC # BLD: 6.1 K/UL (ref 4–11)

## 2022-11-28 PROCEDURE — 99233 SBSQ HOSP IP/OBS HIGH 50: CPT | Performed by: NURSE PRACTITIONER

## 2022-11-28 PROCEDURE — 36415 COLL VENOUS BLD VENIPUNCTURE: CPT

## 2022-11-28 PROCEDURE — 3430000000 HC RX DIAGNOSTIC RADIOPHARMACEUTICAL: Performed by: INTERNAL MEDICINE

## 2022-11-28 PROCEDURE — 78452 HT MUSCLE IMAGE SPECT MULT: CPT

## 2022-11-28 PROCEDURE — 85025 COMPLETE CBC W/AUTO DIFF WBC: CPT

## 2022-11-28 PROCEDURE — G0378 HOSPITAL OBSERVATION PER HR: HCPCS

## 2022-11-28 PROCEDURE — 6360000002 HC RX W HCPCS: Performed by: INTERNAL MEDICINE

## 2022-11-28 PROCEDURE — 80048 BASIC METABOLIC PNL TOTAL CA: CPT

## 2022-11-28 PROCEDURE — 97530 THERAPEUTIC ACTIVITIES: CPT

## 2022-11-28 PROCEDURE — 1200000000 HC SEMI PRIVATE

## 2022-11-28 PROCEDURE — A9502 TC99M TETROFOSMIN: HCPCS | Performed by: INTERNAL MEDICINE

## 2022-11-28 PROCEDURE — 6370000000 HC RX 637 (ALT 250 FOR IP): Performed by: INTERNAL MEDICINE

## 2022-11-28 PROCEDURE — 93017 CV STRESS TEST TRACING ONLY: CPT

## 2022-11-28 PROCEDURE — 97165 OT EVAL LOW COMPLEX 30 MIN: CPT

## 2022-11-28 RX ORDER — AMLODIPINE BESYLATE 5 MG/1
5 TABLET ORAL DAILY
Qty: 30 TABLET | Refills: 3 | Status: SHIPPED | OUTPATIENT
Start: 2022-11-29

## 2022-11-28 RX ORDER — ISOSORBIDE MONONITRATE 30 MG/1
30 TABLET, EXTENDED RELEASE ORAL DAILY
Qty: 30 TABLET | Refills: 3 | Status: SHIPPED | OUTPATIENT
Start: 2022-11-29

## 2022-11-28 RX ORDER — ROSUVASTATIN CALCIUM 10 MG/1
10 TABLET, COATED ORAL DAILY
Qty: 30 TABLET | Refills: 3 | Status: SHIPPED | OUTPATIENT
Start: 2022-11-29

## 2022-11-28 RX ADMIN — REGADENOSON 0.4 MG: 0.08 INJECTION, SOLUTION INTRAVENOUS at 09:47

## 2022-11-28 RX ADMIN — ISOSORBIDE MONONITRATE 30 MG: 30 TABLET, FILM COATED, EXTENDED RELEASE ORAL at 14:15

## 2022-11-28 RX ADMIN — AMLODIPINE BESYLATE 5 MG: 5 TABLET ORAL at 14:15

## 2022-11-28 RX ADMIN — METOPROLOL TARTRATE 50 MG: 50 TABLET ORAL at 14:16

## 2022-11-28 RX ADMIN — TETROFOSMIN 31 MILLICURIE: 1.38 INJECTION, POWDER, LYOPHILIZED, FOR SOLUTION INTRAVENOUS at 09:47

## 2022-11-28 RX ADMIN — TETROFOSMIN 10.4 MILLICURIE: 1.38 INJECTION, POWDER, LYOPHILIZED, FOR SOLUTION INTRAVENOUS at 08:34

## 2022-11-28 NOTE — PROGRESS NOTES
Occupational Therapy  Facility/Department: [place] [address] REMOTE TELEMETRY  Occupational Therapy Initial Assessment, Treatment and Discharge    Name: Lorelle Gowers  : 1932  MRN: [id number]  Date of Service: 2022    Discharge Recommendations:  Home with assist PRN  OT Equipment Recommendations  Equipment Needed: No     Patient Diagnosis(es): The encounter diagnosis was Chest pain, unspecified type. Past Medical History:  has a past medical history of Arthritis, ASHD (arteriosclerotic heart disease), Breast cancer ([facility] [address] Hyperlipidemia, and Hypertension. Past Surgical History:  has a past surgical history that includes Coronary angioplasty with stent (2009);  section; Breast lumpectomy (); Tonsillectomy; eye surgery (Right, 2013); eye surgery (Left, 13); and Dilation and curettage of uterus (2017). Assessment   Performance deficits / Impairments:  (Pt at baseline functional status)  Assessment: Pt is a 79 yo F who presented to [facility] w/ chest pain. Pt lives alone in single story home w/ 1 CARLOS, IND w/ ADLs and amb at baseline w/ PRN assist from dtr. Upon eval, pt completed all func mob/transfers and ADLs w/ IND. Pt denies any concerns w/ returning to home environment, stating she feels she has returned to her baseline. Pt does not display any acute deficits to be addressed by inpatient OT at this time; OT signing off. Rec home w/ PRN at d/c to maximize pt's functional status and safety upon return to home environment.   Prognosis: Good  Decision Making: Low Complexity  REQUIRES OT FOLLOW-UP: No  Activity Tolerance  Activity Tolerance: Patient Tolerated treatment well        AM-PAC Daily Activity Inpatient   How much help for putting on and taking off regular lower body clothing?: None  How much help for Bathing?: None  How much help for Toileting?: None  How much help for putting on and taking off regular upper body clothing?: None  How much help for taking care of personal grooming?: None  How much help for eating meals?: None  AM-PAC Inpatient Daily Activity Raw Score: 24  AM-PAC Inpatient ADL T-Scale Score : 57.54  ADL Inpatient CMS 0-100% Score: 0  ADL Inpatient CMS G-Code Modifier : [address]   Occupational Therapy Plan  Times Per Week: 1x visit     Restrictions  Restrictions/Precautions  Restrictions/Precautions: Fall Risk, General Precautions  Position Activity Restriction  Other position/activity restrictions: Up with assist.    Subjective   General  Chart Reviewed: Yes, Orders, Progress Notes, History and Physical  Patient assessed for rehabilitation services?: Yes  Subjective  Subjective: pt received in stance at EOB, agreeable to OT eval  Pain: denies pain    Social/Functional History  Social/Functional History  Lives With: Alone  Type of Home: House  Home Layout: One level  Home Access: Stairs to enter without rails  Entrance Stairs - Number of Steps: 1  Bathroom Shower/Tub: Walk-in shower  Bathroom Toilet: Handicap height  Bathroom Equipment:  (No DME)  Home Equipment: Cane (Has cane but dont really use it.)  Has the patient had two or more falls in the past year or any fall with injury in the past year?: No  ADL Assistance: Independent  Homemaking Assistance: Independent  Homemaking Responsibilities: Yes  Meal Prep Responsibility: Primary  Laundry Responsibility: Primary  Cleaning Responsibility: Primary  Shopping Responsibility: Primary  Ambulation Assistance: Independent  Transfer Assistance: Independent  Active : Yes  Occupation: Retired  Type of Occupation: [employer]  Additional Comments: Patient's daughter is available to assist as needed. She is retired. \"I recently asked her to help with mowing the lawn. \"     Objective   Heart Rate: 92   Heart Rate Source: Monitor   BP: (!) 155/80  BP Location: Left upper arm   BP Method: Automatic   Patient Position: Semi fowlers   MAP (Calculated): 110  Resp: 22  SpO2: 96 %   O2 Device: None (Room air)         Safety Devices  Type of Devices: All fall risk precautions in place;Call light within reach;Gait belt;Nurse notified; Left in bed (alarm was not activated upon entry of therapy)  Restraints  Restraints Initially in Place: No    Bed Mobility Training  Bed Mobility Training: No (pt in stance at EOB upon arrival; seated at EOB at EOS)  Transfer Training  Transfer Training: Yes  Balance  Sitting: Intact  Standing: Intact  Transfer Training  Transfer Training: Yes  Overall Level of Assistance: Independent  Sit to Stand: Independent  Stand to Sit: Independent  Bed to Chair: Independent  Gait Training  Gait Training: Yes  Gait  Overall Level of Assistance: Modified independent  Interventions: Safety awareness training  Toilet Transfer: Independent     AROM: Within functional limits  PROM: Within functional limits  Strength: Within functional limits  Coordination: Within functional limits    ADL  LE Dressing: Independent  LE Dressing Skilled Clinical Factors: don/doff shoes  Additional Comments: pt declined all additional ADLs      Vision  Vision: Impaired  Vision Exceptions: Wears glasses at all times; Wears glasses for reading  Hearing  Hearing: Exceptions to [facility]  Hearing Exceptions: Hard of hearing/hearing concerns    Cognition  Overall Cognitive Status: WFL  Orientation  Overall Orientation Status: Within Functional Limits  Orientation Level: Oriented X4      Education Given To: Patient  Education Provided: Role of Therapy;Plan of Care;Precautions;IADL Safety; Fall Prevention Strategies  Education Method: Demonstration;Verbal  Barriers to Learning: None  Education Outcome: Verbalized understanding;Demonstrated understanding    Disease Specific Education: Pt educated on importance of OOB mobility, prevention of complications of bedrest, and general safety during hospitalization.  Pt verbalized understanding      Goals  Short Term Goals  Time Frame for Short Term Goals: 1x visit  Short Term Goal 1: Pt will complete func mob/transfers w/ IND - goal met 11/28  Short Term Goal 2: Pt will complete LB ADLs w/ IND - goal met 11/28  Patient Goals   Patient goals : \"I want to go home\"       Therapy Time   Individual Concurrent Group Co-treatment   Time In 1340         Time Out 1400         Minutes 20         Timed Code Treatment Minutes: 10 Minutes (10 min eval)     If pt is unable to be seen after this session, please let this note serve as discharge summary. Please see case management note for discharge disposition. Thank you.      Aaron Garcia OTR/L

## 2022-11-28 NOTE — PROGRESS NOTES
Patient discharged per MD. IV removed without complication. Tele removed and CMU notified. Discharge instructions provided with no questions/concerns.  Patient discharged with daughter via w/c medications  at outside pharmacy per request.

## 2022-11-28 NOTE — DISCHARGE SUMMARY
Hospital Medicine Discharge Summary    Patient ID: Yanira Ponce      Patient's PCP: Sheree Tamayo MD    Admit Date: 11/26/2022     Discharge Date:   11/28/22     Admitting Provider: Yamileth Kauffman MD     Discharge Provider: Nahomy Wong MD     Discharge Diagnoses: Active Hospital Problems    Diagnosis     Dyspnea [R06.00]      Priority: Medium    Coronary artery disease [I25.10]      Priority: Medium    Uncontrolled hypertension [I10]      Priority: Medium    Chest pain [R07.9]      Priority: Medium       The patient was seen and examined on day of discharge and this discharge summary is in conjunction with any daily progress note from day of discharge. Hospital Course:  80 Y F with a h/o CAD was admitted with atypical chest pain. Aspirin, statin, metoprolol, ISMN. Cardiology recommended nuclear stress test and this was reassuring. I do not suspect PE or acute aortic pathology. She can get outpatient TTE if cardiology desires. HTN. Uncontrolled here. 3 outpatient BP's in the last 7 months: 150/74, 150/82, and 138/64. Increased amlodipine and ISMN. Continue metoprolol. Reassess BP as outpatient. HLD. Statin as above. Physical Exam Performed:     /78   Pulse 58   Temp 97.4 °F (36.3 °C) (Oral)   Resp 16   Ht 5' 5\" (1.651 m)   Wt 145 lb (65.8 kg)   SpO2 97%   BMI 24.13 kg/m²       General appearance:  No apparent distress, appears stated age and cooperative. HEENT:  Normal cephalic, atraumatic without obvious deformity. Pupils equal, round, and reactive to light. Extra ocular muscles intact. Conjunctivae/corneas clear. Neck: Supple, with full range of motion. No jugular venous distention. Trachea midline. Respiratory:  Normal respiratory effort. Clear to auscultation, bilaterally without Rales/Wheezes/Rhonchi. Cardiovascular:  Regular rate and rhythm with normal S1/S2 without murmurs, rubs or gallops.   Abdomen: Soft, non-tender, non-distended with normal bowel sounds. Musculoskeletal:  No clubbing, cyanosis or edema bilaterally. Full range of motion without deformity. Skin: Skin color, texture, turgor normal.  No rashes or lesions. Neurologic:  Neurovascularly intact without any focal sensory/motor deficits. Cranial nerves: II-XII intact, grossly non-focal.  Psychiatric:  Alert and oriented, thought content appropriate, normal insight  Capillary Refill: Brisk,< 3 seconds   Peripheral Pulses: +2 palpable, equal bilaterally       Labs: For convenience and continuity at follow-up the following most recent labs are provided:      CBC:    Lab Results   Component Value Date/Time    WBC 6.1 11/28/2022 05:05 AM    HGB 14.9 11/28/2022 05:05 AM    HCT 46.3 11/28/2022 05:05 AM     11/28/2022 05:05 AM       Renal:    Lab Results   Component Value Date/Time     11/28/2022 05:05 AM    K 4.3 11/28/2022 05:05 AM     11/28/2022 05:05 AM    CO2 20 11/28/2022 05:05 AM    BUN 13 11/28/2022 05:05 AM    CREATININE 0.7 11/28/2022 05:05 AM    CALCIUM 10.3 11/28/2022 05:05 AM    PHOS 3.2 10/09/2022 06:08 AM         Significant Diagnostic Studies    Radiology:   NM Cardiac Stress Test Nuclear Imaging   Final Result      XR CHEST PORTABLE   Final Result   1. No active pulmonary disease. Consults:     IP CONSULT TO HOSPITALIST  IP CONSULT TO CARDIOLOGY    Disposition:  home     Condition at Discharge: Stable    Discharge Instructions/Follow-up:  Follow up with PCP within 1-2 weeks.        Code Status:  Full Code     Activity: activity as tolerated    Diet: regular diet      Discharge Medications:     Current Discharge Medication List             Details   isosorbide mononitrate (IMDUR) 30 MG extended release tablet Take 1 tablet by mouth daily  Qty: 30 tablet, Refills: 3      rosuvastatin (CRESTOR) 10 MG tablet Take 1 tablet by mouth daily  Qty: 30 tablet, Refills: 3      amLODIPine (NORVASC) 5 MG tablet Take 1 tablet by mouth daily  Qty: 30

## 2022-11-28 NOTE — PROGRESS NOTES
[facility]   Daily Progress Note      Admit Date:  11/26/2022    Reason for follow up visit: Chest pain    CC: \"I feel good. I haven't had anymore chest pain. \"    81 y/o female with PMH significant for CAD/remote PCI to LAD and RCA, HTN, HLP and breast CA who was admitted to [facility] with chest pain. She developed SOB while walking up her stairs and sat down, then developed chest pain. Took NTG x 3 and sxs improved. She was hypertensive on admission (184/87) and troponins and ECG without evidence of ischemia. Interval History:  Pt. seen and examined; records reviewed  BP noted and remains elevated. Ambulating in room without complaints  Denies recurrent chest pain or SOB    Subjective:  Pt with no acute overnight cardiac events. Denies chest pain, SOB, cough, palpitations or dizziness    Review of Systems:   Constitutional: no unanticipated weight loss. There's been no change in energy level, sleep pattern, or activity level. No fevers, chills. Eyes: No visual changes or diplopia. No scleral icterus. ENT: No Headaches, hearing loss or vertigo. No mouth sores or sore throat. Cardiovascular: as reviewed in HPI  Respiratory: No cough or wheezing, no sputum production. No hematemesis. Gastrointestinal: No abdominal pain, appetite loss, blood in stools. No change in bowel or bladder habits. Genitourinary: No dysuria, trouble voiding, or hematuria. Musculoskeletal:  No gait disturbance, no joint complaints. Integumentary: No rash or pruritis. Neurological: No headache, diplopia, change in muscle strength, numbness or tingling. Psychiatric: No anxiety or depression. Endocrine: No temperature intolerance. No excessive thirst, fluid intake, or urination. No tremor. Hematologic/Lymphatic: No abnormal bruising or bleeding, blood clots or swollen lymph nodes. Allergic/Immunologic: No nasal congestion or hives.     Objective:   /78   Pulse 58   Temp 97.4 °F (36.3 °C) (Oral)   Resp 16   Ht 5' 5\" (1.651 m)   Wt 145 lb (65.8 kg)   SpO2 97%   BMI 24.13 kg/m²     Intake/Output Summary (Last 24 hours) at 11/28/2022 1329  Last data filed at 11/28/2022 0249  Gross per 24 hour   Intake 1586.1 ml   Output --   Net 1586.1 ml     Wt Readings from Last 3 Encounters:   11/26/22 145 lb (65.8 kg)   10/06/22 148 lb (67.1 kg)   09/28/22 146 lb 12.8 oz (66.6 kg)       Physical Exam:  General: In no acute distress. Awake, alert, and oriented X4. Up in room  HEENT: Sclerae anicteric. No xanthelasmas  Skin:  Warm and dry. No new appearing rashes or lesions. Neck:  Supple. No JVD or carotid bruit appreciated. Chest: Lungs clear to auscultation. No wheezes/rhonchi/rales  Cardiovascular:  RRR. Normal S1 and S2. No murmur/gallop or rub  Abdomen:  soft, nontender, nondistended, +bowel sounds. No hepatomegaly  Extremities:  No LE edema. No clubbing or cyanosis. 2+ bilateral radial/carotid and DP pulses. Cap refill brisk    Medications:    metoprolol tartrate  50 mg Oral BID    amLODIPine  5 mg Oral Daily    isosorbide mononitrate  30 mg Oral Daily    rosuvastatin  10 mg Oral Daily    aspirin  81 mg Oral Daily    sodium chloride flush  10 mL IntraVENous 2 times per day    enoxaparin  40 mg SubCUTAneous Daily      sodium chloride      sodium chloride 50 mL/hr at 11/28/22 0249     hydrALAZINE, perflutren lipid microspheres, sodium chloride flush, sodium chloride, polyethylene glycol, acetaminophen **OR** acetaminophen, promethazine    Lab Data:  CBC:   Recent Labs     11/26/22 1715 11/27/22  0441 11/28/22  0505   WBC 6.7 7.0 6.1   HGB 15.5 15.4 14.9    148 107*     BMP:    Recent Labs     11/26/22 1715 11/27/22  0441 11/28/22  0505    144 137   K 4.0 4.6 4.3   CO2 23 27 20*   BUN 15 11 13   CREATININE 0.7 0.7 0.7     LIVR:   Recent Labs     11/26/22  1715   AST 28   ALT 16     Lexiscan-Myoview 11/28/2022:    Normal LV function.     There is normal isotope uptake at stress and rest. There is no evidence of myocardial ischemia or scar. Normal myocardial perfusion study. Echo: pending    ECG:  Sinus rhythm with 1st degree AV block, LAFTERE    3/2018 Stress Myoview:      Normal nuclear myocardial perfusion scan     Telemetry: Sinus rhythm-sinus tachycardia  (Personally reviewed)    Assessment/Plan:    1) Chest pain with exertional dyspnea  -? Anginal equivalent  -no evidence of an acute coronary syndrome  -no recurrent episodes    2) Coronary artery disease in native coronary artery  -remote PCI of LAD and RCA  -no recurrent angina  -continue ASA, statin, and BB  -continue imdur    3) Uncontrolled HTN  -remains elevated  -meds adjusted on admission  -gradually improving    4) Hyperlipidemia  -continue statin    Follow up stress test and echo: if okay hopefully home later today and monitor BP as outpatient.  If echo not completed today, can arrange as outpatient    Follow up with her primary cardiologist at [address] [place] [address] at discharge within 1-2 weeks (Dr. Darcy Mercado)    Low fat/low sodium diet and exercise discussed    Discharge Cardiac Meds:  Amlodipine 5 mg daily  ASA 81 mg daily  Imdur 30 mg daily  Metoprolol Tartrate 50 mg BID  Rosuvastatin 10 mg daily    Electronically signed by MAIKOL Garcia CNP on 11/28/2022 at 1:29 PM

## 2022-11-28 NOTE — PROGRESS NOTES
Hospitalist Progress Note      PCP: Shivam Mohr MD    Date of Admission: 11/26/2022    Chief Complaint: chest pain       Subjective:  She is feeling well, no further chest pain. Seen in stress lab. Medications:  Reviewed    Infusion Medications    sodium chloride      sodium chloride 50 mL/hr at 11/28/22 0249     Scheduled Medications    metoprolol tartrate  50 mg Oral BID    amLODIPine  5 mg Oral Daily    isosorbide mononitrate  30 mg Oral Daily    rosuvastatin  10 mg Oral Daily    aspirin  81 mg Oral Daily    sodium chloride flush  10 mL IntraVENous 2 times per day    enoxaparin  40 mg SubCUTAneous Daily     PRN Meds: hydrALAZINE, perflutren lipid microspheres, sodium chloride flush, sodium chloride, polyethylene glycol, acetaminophen **OR** acetaminophen, promethazine      Intake/Output Summary (Last 24 hours) at 11/28/2022 1110  Last data filed at 11/28/2022 0249  Gross per 24 hour   Intake 1586.1 ml   Output --   Net 1586.1 ml       Physical Exam Performed:    /78   Pulse 58   Temp 97.4 °F (36.3 °C) (Oral)   Resp 16   Ht 5' 5\" (1.651 m)   Wt 145 lb (65.8 kg)   SpO2 97%   BMI 24.13 kg/m²     General appearance: No apparent distress, appears stated age and cooperative. HEENT: Pupils equal, round, and reactive to light. Conjunctivae/corneas clear. Neck: Supple, with full range of motion. No jugular venous distention. Trachea midline. Respiratory:  Normal respiratory effort. Clear to auscultation, bilaterally without Rales/Wheezes/Rhonchi. Cardiovascular: Regular rate and rhythm with normal S1/S2 without murmurs, rubs or gallops. Abdomen: Soft, non-tender, non-distended with normal bowel sounds. Musculoskeletal: No clubbing, cyanosis or edema bilaterally. Full range of motion without deformity. Skin: Skin color, texture, turgor normal.  No rashes or lesions. Neurologic:  Neurovascularly intact without any focal sensory/motor deficits.  Cranial nerves: II-XII intact, grossly non-focal.  Psychiatric: Alert and oriented, thought content appropriate, normal insight  Capillary Refill: Brisk, 3 seconds, normal   Peripheral Pulses: +2 palpable, equal bilaterally       Labs:   Recent Labs     11/26/22 1715 11/27/22 0441 11/28/22  0505   WBC 6.7 7.0 6.1   HGB 15.5 15.4 14.9   HCT 46.0 45.5 46.3    148 107*     Recent Labs     11/26/22 1715 11/27/22 0441 11/28/22  0505    144 137   K 4.0 4.6 4.3    109 108   CO2 23 27 20*   BUN 15 11 13   CREATININE 0.7 0.7 0.7   CALCIUM 10.5 10.3 10.3     Recent Labs     11/26/22 1715   AST 28   ALT 16   BILITOT 0.4   ALKPHOS 69     No results for input(s): INR in the last 72 hours. Recent Labs     11/26/22 2133 11/27/22 0441 11/27/22  0859   TROPONINI <0.01 <0.01 <0.01       Urinalysis:      Lab Results   Component Value Date/Time    NITRU Negative 11/26/2022 06:10 PM    WBCUA 3-5 03/31/2022 06:20 PM    RBCUA None seen 03/31/2022 06:20 PM    BLOODU Negative 11/26/2022 06:10 PM    SPECGRAV <=1.005 11/26/2022 06:10 PM    GLUCOSEU Negative 11/26/2022 06:10 PM       Radiology:  XR CHEST PORTABLE   Final Result   1. No active pulmonary disease. NM Cardiac Stress Test Nuclear Imaging    (Results Pending)       IP CONSULT TO HOSPITALIST  IP CONSULT TO CARDIOLOGY    Assessment/Plan:    Active Hospital Problems    Diagnosis     Dyspnea [R06.00]      Priority: Medium    Coronary artery disease [I25.10]      Priority: Medium    Uncontrolled hypertension [I10]      Priority: Medium    Chest pain [R07.9]      Priority: Medium       80 Y F with a h/o CAD was admitted with atypical chest pain. Aspirin, statin, metoprolol, ISMN. Cardiology recommended nuclear stress test and TTE, both of which are pending at this time. I do not suspect PE or acute aortic pathology. HTN. Uncontrolled here. 3 outpatient BP's in the last 7 months: 150/74, 150/82, and 138/64. Increased amlodipine and ISMN. Continue metoprolol.   Reassess BP as outpatient. HLD. Statin as above. DVT Prophylaxis: enoxaparin  Diet: Diet NPO Exceptions are: Ice Chips, Sips of Water with Meds  Code Status: Full Code  PT/OT Eval Status: eval ordered    Dispo - perhaps 11/28 - 11/29, pending cardiology input after stress test and echo, also pending PT/OT.     Appropriate for A1 Discharge Unit: Thelma Botello MD

## 2022-11-28 NOTE — PROGRESS NOTES
Occupational Therapy Attempt  Orders received and reviewed. Pt attempted for OT evaluation this date. Pt is currently unavailable, off the floor for testing. Will re-attempt as schedule permits and pt medically appropriate.      Thank you,    Leland Leach, OTR/L #[id number]

## 2022-11-28 NOTE — PROGRESS NOTES
Assessment complete and charted. Pt denies CP or SOB other than LOU x1 to restroom prior to writer assuming care leading to motionID technologies like my heart was racing. \" Pt does continue to yuridia down at times while asleep reaching as low as 38 but immediately climbing back up. Pt asymptomatic during this. Call light within reach.

## 2022-11-28 NOTE — PROGRESS NOTES
Shift assessment completed. Pt A&O x4. VSS. Medications admin per MAR. Pt tolerating diet per order; pt educated on NPO status at midnight, pt verbalizes understanding. Pt denies any pain at this time. Denies any needs at this time. Bed locked and in lowest position. Call light within reach. Will continue to monitor.  Electronically signed by Nile Leonardo RN on 11/27/2022 at 10:30 PM

## 2022-11-28 NOTE — PLAN OF CARE
Problem: Pain  Goal: Verbalizes/displays adequate comfort level or baseline comfort level  11/27/2022 2228 by Sidra Cheema RN  Outcome: Progressing  11/27/2022 1612 by Sidra Cheema RN  Outcome: Progressing     Problem: ABCDS Injury Assessment  Goal: Absence of physical injury  11/27/2022 2228 by Sidra Cheema RN  Outcome: Progressing  11/27/2022 1612 by Sidra Cheema RN  Outcome: Progressing     Problem: Safety - Adult  Goal: Free from fall injury  11/27/2022 2228 by Sidra Cheema RN  Outcome: Progressing  11/27/2022 1612 by Sidra Cheema RN  Outcome: Progressing

## 2022-11-29 ENCOUNTER — CARE COORDINATION (OUTPATIENT)
Dept: CASE MANAGEMENT | Age: 87
End: 2022-11-29

## 2022-11-29 NOTE — CARE COORDINATION
[facility] Care Transitions Initial Follow Up Call    Call within 2 business days of discharge: Yes    Care Transition Nurse contacted the patient by telephone. Patient: Chad Tamayo Patient : 1932   MRN: [id number]  Reason for Admission: CP   Discharge Date: 22 RARS: Readmission Risk Score: 12.5      Last Discharge [address]       Date Complaint Diagnosis Description Type Department Provider    22 Chest Pain Chest pain, unspecified type ED to Hosp-Admission (Discharged) (ADMITTED) Ti Pritchett MD; Susan Celis Pr. .. Was this an external facility discharge? No Discharge Facility: n.a        Attempted to reach patient via phone for care transition. Contacted home number x2 and both times call was picked up and hung up. CTN attempted mobile number and left vm.            Care Transitions 24 Hour Call    Care Transitions Interventions         Pablo ROCK, RN, [facility]  Care Transition Nurse  [phone] mobile

## 2022-11-30 ENCOUNTER — CARE COORDINATION (OUTPATIENT)
Dept: CASE MANAGEMENT | Age: 87
End: 2022-11-30

## 2022-11-30 NOTE — CARE COORDINATION
[facility] Care Transitions Follow Up Call    Care Transition Nurse contacted the patient by telephone. Patient: Rosa Allison  Patient : 1932   MRN: [id number]  Reason for Admission: CP   Discharge Date: 22 RARS: Readmission Risk Score: 12.5      Final attempt made to reach patient for post hospital follow up call. Left a voice message for patient with my contact information and informed of final outreach attempt on mobile number listed, home number attempted and phone is picked up and then hung up.                 Care Transitions Subsequent and Final Call    Subsequent and Final Calls  Care Transitions Interventions  Other Interventions:             Clarita PAIGEN, RN, [facility]  Care Transition Nurse  [phone] mobile

## 2022-12-06 NOTE — PROGRESS NOTES
Physician Progress Note      PATIENT:               Sushant aPyne  CSN #:                  [id number]  :                       1932  ADMIT DATE:       2022 4:52 PM  [address] DATE:        2022 5:34 PM  RESPONDING  PROVIDER #:        Toñito Galarza MD          QUERY TEXT:    Pt admitted with CP. Noted documentation of \"Chest pain with exertional   dyspnea-? Anginal equivalent\" by cardiology on . If possible, please   document in progress notes and discharge summary:    The medical record reflects the following:  Risk Factors: NSTEMI, CAD, HTN, HLD  Clinical Indicators: remote PCI, \"  80 Y F with a h/o CAD was admitted with   atypical chest pain. Aspirin, statin, metoprolol, ISMN. Cardiology   recommended nuclear stress test and this was reassuring. \"- DC summary   Treatment: Stress test, EKG, cardiology consult, outpatient JEAN PAUL, serail labs,   and supportive care    Thank you,  Christy Ragland RN, BSN  Options provided:  -- CP due to CAD with angina confirmed present on admission  -- CP due to CAD without angina confirmed present on admission  -- Other - I will add my own diagnosis  -- Disagree - Not applicable / Not valid  -- Disagree - Clinically unable to determine / Unknown  -- Refer to Clinical Documentation Reviewer    PROVIDER RESPONSE TEXT:    Provider is clinically unable to determine a response to this query.   unknown    Query created by: Emani Ríos on 2022 1:47 PM      Electronically signed by:  Toñito Galarza MD 2022 3:37 PM

## 2022-12-12 ENCOUNTER — CARE COORDINATION (OUTPATIENT)
Dept: CARE COORDINATION | Age: 87
End: 2022-12-12

## 2022-12-19 ENCOUNTER — CARE COORDINATION (OUTPATIENT)
Dept: CARE COORDINATION | Age: 87
End: 2022-12-19

## 2022-12-19 NOTE — CARE COORDINATION
Attempted to call. No answer. Patient Excluded from Care Coordination?  Yes     The patient will be excluded from Care Coordination for the following reason: Patient unable to contact to enroll

## 2023-01-03 ENCOUNTER — TELEPHONE (OUTPATIENT)
Dept: FAMILY MEDICINE CLINIC | Age: 88
End: 2023-01-03

## 2023-01-03 NOTE — TELEPHONE ENCOUNTER
Left message for pt to schedule nurse AWV- This can be done  in office or via telephone-( please schedule as a VVAWV appt type if telephone.)  Due after 1/19/23

## 2023-02-09 NOTE — TELEPHONE ENCOUNTER
LM with Josejennyferteresa ShepherdEl, pt's daughter. She states that she will have Adriana Pereira call our office to schedule this.

## 2023-03-28 ENCOUNTER — TELEMEDICINE (OUTPATIENT)
Dept: FAMILY MEDICINE CLINIC | Age: 88
End: 2023-03-28
Payer: MEDICARE

## 2023-03-28 DIAGNOSIS — Z00.00 MEDICARE ANNUAL WELLNESS VISIT, SUBSEQUENT: Primary | ICD-10-CM

## 2023-03-28 PROCEDURE — G0439 PPPS, SUBSEQ VISIT: HCPCS | Performed by: FAMILY MEDICINE

## 2023-03-28 PROCEDURE — 1123F ACP DISCUSS/DSCN MKR DOCD: CPT | Performed by: FAMILY MEDICINE

## 2023-03-28 RX ORDER — CLOPIDOGREL BISULFATE 75 MG/1
75 TABLET ORAL DAILY
COMMUNITY
Start: 2023-03-17

## 2023-03-28 ASSESSMENT — LIFESTYLE VARIABLES
HOW MANY STANDARD DRINKS CONTAINING ALCOHOL DO YOU HAVE ON A TYPICAL DAY: PATIENT DOES NOT DRINK
HOW OFTEN DO YOU HAVE A DRINK CONTAINING ALCOHOL: NEVER

## 2023-03-28 NOTE — PATIENT INSTRUCTIONS
Personalized Preventive Plan for Allyson Siddiqui - 3/28/2023  Medicare offers a range of preventive health benefits. Some of the tests and screenings are paid in full while other may be subject to a deductible, co-insurance, and/or copay. Some of these benefits include a comprehensive review of your medical history including lifestyle, illnesses that may run in your family, and various assessments and screenings as appropriate. After reviewing your medical record and screening and assessments performed today your provider may have ordered immunizations, labs, imaging, and/or referrals for you. A list of these orders (if applicable) as well as your Preventive Care list are included within your After Visit Summary for your review. Other Preventive Recommendations:    A preventive eye exam performed by an eye specialist is recommended every 1-2 years to screen for glaucoma; cataracts, macular degeneration, and other eye disorders. A preventive dental visit is recommended every 6 months. Try to get at least 150 minutes of exercise per week or 10,000 steps per day on a pedometer . Order or download the FREE \"Exercise & Physical Activity: Your Everyday Guide\" from [employer]. Call 5-100.419.7645 or search [employer] online. You need 5135-8058 mg of calcium and 0962-1461 IU of vitamin D per day. It is possible to meet your calcium requirement with diet alone, but a vitamin D supplement is usually necessary to meet this goal.  When exposed to the sun, use a sunscreen that protects against both UVA and UVB radiation with an SPF of 30 or greater. Reapply every 2 to 3 hours or after sweating, drying off with a towel, or swimming. Always wear a seat belt when traveling in a car. Always wear a helmet when riding a bicycle or motorcycle.

## 2023-03-28 NOTE — PROGRESS NOTES
Medicare Annual Wellness Visit    Keeley Lyon is here for Medicare AWV    Assessment & Plan   Medicare annual wellness visit, subsequent    Recommendations for Preventive Services Due: see orders and patient instructions/AVS.  Recommended screening schedule for the next 5-10 years is provided to the patient in written form: see Patient Instructions/AVS.     No follow-ups on file. Subjective       Patient's complete Health Risk Assessment and screening values have been reviewed and are found in Flowsheets. The following problems were reviewed today and where indicated follow up appointments were made and/or referrals ordered. No Positive Risk Factors identified today. Objective      Patient-Reported Vitals  Patient-Reported Weight: 150lb  Patient-Reported Height: 5' 5\"            Allergies   Allergen Reactions    Carbocaine [Mepivacaine Hcl] Other (See Comments)     Dizzy, almost pass out    Ciprofloxacin     Metronidazole Other (See Comments)    Prevnar 13 [Pneumococcal 13-Louisa Conj Vacc] Swelling     Localized reaction    Zofran [Ondansetron Hcl] Other (See Comments)     Pt states it caused her stomach to \"burn\"     Prior to Visit Medications    Medication Sig Taking? Authorizing Provider   isosorbide mononitrate (IMDUR) 30 MG extended release tablet Take 1 tablet by mouth daily Yes Stanley Forte MD   rosuvastatin (CRESTOR) 10 MG tablet Take 1 tablet by mouth daily Yes Stanley Forte MD   amLODIPine (NORVASC) 5 MG tablet Take 1 tablet by mouth daily Yes Stanley Forte MD   aspirin 81 MG tablet Take 81 mg by mouth daily Yes Historical Provider, MD   metoprolol (LOPRESSOR) 50 MG tablet Take 50 mg by mouth 2 times daily.    Yes Historical Provider, MD   clopidogrel (PLAVIX) 75 MG tablet Take 75 mg by mouth daily  Historical Provider, MD Tomas (Including outside providers/suppliers regularly involved in providing care):   Patient Care Team:  Molly Du MD

## 2023-03-30 ENCOUNTER — CARE COORDINATION (OUTPATIENT)
Dept: CARE COORDINATION | Age: 88
End: 2023-03-30

## 2023-03-30 SDOH — ECONOMIC STABILITY: FOOD INSECURITY: WITHIN THE PAST 12 MONTHS, YOU WORRIED THAT YOUR FOOD WOULD RUN OUT BEFORE YOU GOT MONEY TO BUY MORE.: NEVER TRUE

## 2023-03-30 SDOH — ECONOMIC STABILITY: INCOME INSECURITY: IN THE LAST 12 MONTHS, WAS THERE A TIME WHEN YOU WERE NOT ABLE TO PAY THE MORTGAGE OR RENT ON TIME?: NO

## 2023-03-30 SDOH — ECONOMIC STABILITY: TRANSPORTATION INSECURITY
IN THE PAST 12 MONTHS, HAS LACK OF TRANSPORTATION KEPT YOU FROM MEETINGS, WORK, OR FROM GETTING THINGS NEEDED FOR DAILY LIVING?: NO

## 2023-03-30 SDOH — ECONOMIC STABILITY: TRANSPORTATION INSECURITY
IN THE PAST 12 MONTHS, HAS THE LACK OF TRANSPORTATION KEPT YOU FROM MEDICAL APPOINTMENTS OR FROM GETTING MEDICATIONS?: NO

## 2023-03-30 SDOH — ECONOMIC STABILITY: FOOD INSECURITY: WITHIN THE PAST 12 MONTHS, THE FOOD YOU BOUGHT JUST DIDN'T LAST AND YOU DIDN'T HAVE MONEY TO GET MORE.: NEVER TRUE

## 2023-03-30 SDOH — ECONOMIC STABILITY: HOUSING INSECURITY: IN THE LAST 12 MONTHS, HOW MANY PLACES HAVE YOU LIVED?: 1

## 2023-03-30 SDOH — ECONOMIC STABILITY: HOUSING INSECURITY
IN THE LAST 12 MONTHS, WAS THERE A TIME WHEN YOU DID NOT HAVE A STEADY PLACE TO SLEEP OR SLEPT IN A SHELTER (INCLUDING NOW)?: NO

## 2023-03-30 ASSESSMENT — SOCIAL DETERMINANTS OF HEALTH (SDOH)
HOW OFTEN DO YOU ATTENT MEETINGS OF THE CLUB OR ORGANIZATION YOU BELONG TO?: NEVER
DO YOU BELONG TO ANY CLUBS OR ORGANIZATIONS SUCH AS CHURCH GROUPS UNIONS, FRATERNAL OR ATHLETIC GROUPS, OR SCHOOL GROUPS?: NO
HOW HARD IS IT FOR YOU TO PAY FOR THE VERY BASICS LIKE FOOD, HOUSING, MEDICAL CARE, AND HEATING?: NOT VERY HARD
HOW OFTEN DO YOU GET TOGETHER WITH FRIENDS OR RELATIVES?: ONCE A WEEK
IN A TYPICAL WEEK, HOW MANY TIMES DO YOU TALK ON THE PHONE WITH FAMILY, FRIENDS, OR NEIGHBORS?: ONCE A WEEK
HOW OFTEN DO YOU ATTEND CHURCH OR RELIGIOUS SERVICES?: NEVER

## 2023-03-31 NOTE — CARE COORDINATION
ACM called but patient did not answer. ACM left message for patient to call back and left call back number. Will follow up at a later date.
I agree with the Dejon Smith
have any other concerns about your patients mental well-being? How would you rate their severity and impact on the patient?: No identified areas of concern   How would you rate their home environment in terms of safety and stability (including domestic violence, insecure housing, neighbor harassment)?: Consistently safe, supportive, stable, no identified problems   How do daily activities impact on the patient's well-being? (include current or anticipated unemployment, work, caregiving, access to transportation or other): No identified problems or perceived positive benefits   How would you rate their social network (family, work, friends)?: Restricted participation with some degree of social isolation   How would you rate their financial resources (including ability to afford all required medical care)?: Financially secure, resources adequate, no identified problems   How wells does the patient now understand their health and well-being (symptoms, signs or risk factors) and what they need to do to manage their health?: Reasonable to good understanding and already engages in managing health or is willing to undertake better management   How well do you think your patient can engage in healthcare discussions? (Barriers include language, deafness, aphasia, alcohol or drug problems, learning difficulties, concentration): Clear and open communication, no identified barriers   Do other services need to be involved to help this patient?: Other care/services not required at this time   Are current services involved with this patient well-coordinated? (Include coordination with other services you are now recommendation): All required care/services in place and well-coordinated   Suggested Interventions and [employer] Services: Declined   Zone Management Tools: Declined                  No future appointments.    and   General Assessment    Do you have any symptoms that are causing concern?: No

## 2023-04-25 ENCOUNTER — CARE COORDINATION (OUTPATIENT)
Dept: CARE COORDINATION | Age: 88
End: 2023-04-25

## 2023-05-09 ENCOUNTER — CARE COORDINATION (OUTPATIENT)
Dept: CARE COORDINATION | Age: 88
End: 2023-05-09

## 2023-05-09 NOTE — CARE COORDINATION
Ambulatory Care Coordination Note  2023    Patient Current Location:  Home: [address] [address]     ACM contacted the patient by telephone. Verified name and  with patient and parent as identifiers. Provided introduction to self, and explanation of the ACM role. Challenges to be reviewed by the provider   Additional needs identified to be addressed with provider: No  none               Method of communication with provider: chart routing. ACM: Nikki Mcdaniel RN    ACM made care coordination outreach to patient. Patient very pleasant on the phone while conversing with ACM. Patient stated no chest pain, swelling or shortness of breath at this time. Patient reported that she does experience swelling in her lower extremities when on her feet for an extended period. ACM reviewed elevating feet and wearing pressure stockings to assist with swelling. Patient verbalized understanding. Patient informed ACM that she takes her blood pressure every few days. Patient reported that her last BP reading was 134/74. Patient stated no other questions or concerns at the end of the call. Patient provided with ACM phone number to call for any non-emergent questions. Patient agreeable to future care coordination calls. ACM will follow up at a later time. Plan   Follow up on BP   Assess future needs       Offered patient enrollment in the Remote Patient Monitoring (RPM) program for in-home monitoring: Patient declined.     Lab Results       None            Care Coordination Interventions    Referral from Primary Care Provider: No  Suggested Interventions and Community Resources  Transportation Support: Declined  Zone Management Tools: Declined (Comment: pt stated she already has them 3/30)          Goals Addressed                   This Visit's Progress     Self Monitoring   Improving     Blood Pressure - I will take my blood pressure as directed - Daily    Barriers: lack of motivation and overwhelmed

## 2023-06-07 ENCOUNTER — CARE COORDINATION (OUTPATIENT)
Dept: CARE COORDINATION | Age: 88
End: 2023-06-07

## 2023-06-22 ENCOUNTER — TELEPHONE (OUTPATIENT)
Dept: FAMILY MEDICINE CLINIC | Age: 88
End: 2023-06-22

## 2023-06-27 ENCOUNTER — OFFICE VISIT (OUTPATIENT)
Dept: FAMILY MEDICINE CLINIC | Age: 88
End: 2023-06-27

## 2023-06-27 VITALS
SYSTOLIC BLOOD PRESSURE: 160 MMHG | OXYGEN SATURATION: 97 % | DIASTOLIC BLOOD PRESSURE: 70 MMHG | BODY MASS INDEX: 24.76 KG/M2 | WEIGHT: 148.6 LBS | HEIGHT: 65 IN | HEART RATE: 66 BPM

## 2023-06-27 DIAGNOSIS — R10.12 LEFT UPPER QUADRANT ABDOMINAL PAIN: Primary | ICD-10-CM

## 2023-06-27 ASSESSMENT — ENCOUNTER SYMPTOMS
BLOOD IN STOOL: 0
ANAL BLEEDING: 0
VOMITING: 0
NAUSEA: 0
ABDOMINAL PAIN: 1

## 2023-06-28 DIAGNOSIS — E83.52 SERUM CALCIUM ELEVATED: Primary | ICD-10-CM

## 2023-06-28 LAB
ANION GAP SERPL CALCULATED.3IONS-SCNC: 13 MMOL/L (ref 3–16)
BASOPHILS # BLD: 0.1 K/UL (ref 0–0.2)
BASOPHILS NFR BLD: 0.7 %
BUN SERPL-MCNC: 19 MG/DL (ref 7–20)
CALCIUM SERPL-MCNC: 11.3 MG/DL (ref 8.3–10.6)
CHLORIDE SERPL-SCNC: 104 MMOL/L (ref 99–110)
CO2 SERPL-SCNC: 26 MMOL/L (ref 21–32)
CREAT SERPL-MCNC: 1 MG/DL (ref 0.6–1.2)
DEPRECATED RDW RBC AUTO: 14.5 % (ref 12.4–15.4)
EOSINOPHIL # BLD: 0.5 K/UL (ref 0–0.6)
EOSINOPHIL NFR BLD: 4.8 %
GFR SERPLBLD CREATININE-BSD FMLA CKD-EPI: 53 ML/MIN/{1.73_M2}
GLUCOSE SERPL-MCNC: 92 MG/DL (ref 70–99)
HCT VFR BLD AUTO: 47.9 % (ref 36–48)
HGB BLD-MCNC: 16 G/DL (ref 12–16)
LYMPHOCYTES # BLD: 1.8 K/UL (ref 1–5.1)
LYMPHOCYTES NFR BLD: 18.1 %
MCH RBC QN AUTO: 30 PG (ref 26–34)
MCHC RBC AUTO-ENTMCNC: 33.3 G/DL (ref 31–36)
MCV RBC AUTO: 90.2 FL (ref 80–100)
MONOCYTES # BLD: 0.7 K/UL (ref 0–1.3)
MONOCYTES NFR BLD: 6.6 %
NEUTROPHILS # BLD: 7.1 K/UL (ref 1.7–7.7)
NEUTROPHILS NFR BLD: 69.8 %
PLATELET # BLD AUTO: 156 K/UL (ref 135–450)
PMV BLD AUTO: 11.6 FL (ref 5–10.5)
POTASSIUM SERPL-SCNC: 4.6 MMOL/L (ref 3.5–5.1)
RBC # BLD AUTO: 5.31 M/UL (ref 4–5.2)
SODIUM SERPL-SCNC: 143 MMOL/L (ref 136–145)
WBC # BLD AUTO: 10.2 K/UL (ref 4–11)

## 2023-06-29 ENCOUNTER — NURSE ONLY (OUTPATIENT)
Dept: FAMILY MEDICINE CLINIC | Age: 88
End: 2023-06-29
Payer: MEDICARE

## 2023-06-29 DIAGNOSIS — E83.52 SERUM CALCIUM ELEVATED: ICD-10-CM

## 2023-06-29 LAB
ANION GAP SERPL CALCULATED.3IONS-SCNC: 11 MMOL/L (ref 3–16)
BUN SERPL-MCNC: 17 MG/DL (ref 7–20)
CALCIUM SERPL-MCNC: 10.5 MG/DL (ref 8.3–10.6)
CHLORIDE SERPL-SCNC: 106 MMOL/L (ref 99–110)
CO2 SERPL-SCNC: 25 MMOL/L (ref 21–32)
CREAT SERPL-MCNC: 1 MG/DL (ref 0.6–1.2)
GFR SERPLBLD CREATININE-BSD FMLA CKD-EPI: 53 ML/MIN/{1.73_M2}
GLUCOSE SERPL-MCNC: 103 MG/DL (ref 70–99)
POTASSIUM SERPL-SCNC: 5 MMOL/L (ref 3.5–5.1)
PTH-INTACT SERPL-MCNC: 65.2 PG/ML (ref 14–72)
SODIUM SERPL-SCNC: 142 MMOL/L (ref 136–145)

## 2023-06-29 PROCEDURE — 36415 COLL VENOUS BLD VENIPUNCTURE: CPT | Performed by: FAMILY MEDICINE

## 2023-07-03 ENCOUNTER — HOSPITAL ENCOUNTER (OUTPATIENT)
Dept: CT IMAGING | Age: 88
Discharge: HOME | End: 2023-07-03
Attending: FAMILY MEDICINE
Payer: MEDICARE

## 2023-07-03 DIAGNOSIS — R10.12 LEFT UPPER QUADRANT ABDOMINAL PAIN: ICD-10-CM

## 2023-07-03 PROCEDURE — 74177 CT ABD & PELVIS W/CONTRAST: CPT

## 2023-07-03 PROCEDURE — 6360000004 HC RX CONTRAST MEDICATION: Performed by: FAMILY MEDICINE

## 2023-07-03 RX ADMIN — IOPAMIDOL 75 ML: 755 INJECTION, SOLUTION INTRAVENOUS at 17:10

## 2023-07-10 ENCOUNTER — CARE COORDINATION (OUTPATIENT)
Dept: CARE COORDINATION | Age: 88
End: 2023-07-10

## 2023-07-11 ENCOUNTER — CARE COORDINATION (OUTPATIENT)
Dept: CARE COORDINATION | Age: 88
End: 2023-07-11

## 2023-07-11 NOTE — CARE COORDINATION
Ambulatory Care Coordination Note  2023    Patient Current Location:  Home: [address] [place] [id number]     ACM contacted the patient by telephone. Verified name and  with patient as identifiers. Provided introduction to self, and explanation of the ACM role. Challenges to be reviewed by the provider   Additional needs identified to be addressed with provider: No  none               Method of communication with provider: none. ACM: Rain Perla RN    ACM made care coordination outreach to patient. Patient stated that she is doing \"well\" and does not have much time to talk at the moment. Patient denied any questions or concerns at this time. Patient provided with ACM phone number to call for any non-emergent questions. Patient agreeable to future care coordination calls. ACM will follow up at a later time. Plan   Follow up BP   Assess future needs   Follow up 2 weeks       Offered patient enrollment in the Remote Patient Monitoring (RPM) program for in-home monitoring: Patient declined. Lab Results       None            Care Coordination Interventions    Referral from Primary Care Provider: No  Suggested Interventions and Community Resources  Transportation Support: Declined  Zone Management Tools: Declined (Comment: pt stated she already has them 3/30)          Goals Addressed                   This Visit's Progress     Self Monitoring   On track     Blood Pressure - I will take my blood pressure as directed - Daily    Barriers: lack of motivation and overwhelmed by complexity of regimen  Plan for overcoming my barriers: education and support   Confidence: 4/10  Anticipated Goal Completion Date: 23                No future appointments.  and   General Assessment    Do you have any symptoms that are causing concern?: No

## 2023-08-08 ENCOUNTER — CARE COORDINATION (OUTPATIENT)
Dept: CARE COORDINATION | Age: 88
End: 2023-08-08

## 2023-08-08 NOTE — CARE COORDINATION
Ambulatory Care Coordination Note  2023    Patient Current Location:  Home: [address] [place] [id number]     ACM contacted the patient by telephone. Verified name and  with patient as identifiers. Provided introduction to self, and explanation of the ACM role. Challenges to be reviewed by the provider   Additional needs identified to be addressed with provider: No  none               Method of communication with provider: chart routing. ACM: Kimberly Berger RN    ACM made final care coordination outreach to patient. Patient stated that she is doing \"well. \"  Patient reported that her blood pressure this morning was 132/67 and HR 63.  Patient denied any chest pain, shortness of breath or swelling at this time. Patient denied any issues with appetite, bowel or bladder. Patient informed ACM that she is doing her stretches in the morning and is steady on her feet with ambulation. Patient stated that she has all of her medications filled and is taking as prescribed. Patient denied any other questions or concerns at this time. Patient given ACM contact information and informed that she can call for any future care coordination needs or if health status changes. Offered patient enrollment in the Remote Patient Monitoring (RPM) program for in-home monitoring: Patient declined.     Lab Results       None            Care Coordination Interventions    Referral from Primary Care Provider: No  Suggested Interventions and Community Resources  Transportation Support: Declined  Zone Management Tools: Declined (Comment: pt stated she already has them 3/30)          Goals Addressed                   This Visit's Progress     COMPLETED: Self Monitoring        Blood Pressure - I will take my blood pressure as directed - Daily    Barriers: lack of motivation and overwhelmed by complexity of regimen  Plan for overcoming my barriers: education and support   Confidence: 4/10  Anticipated Goal Completion

## 2023-12-08 ENCOUNTER — NURSE ONLY (OUTPATIENT)
Dept: FAMILY MEDICINE CLINIC | Age: 88
End: 2023-12-08

## 2023-12-08 DIAGNOSIS — Z23 NEED FOR INFLUENZA VACCINATION: Primary | ICD-10-CM

## 2024-04-02 ENCOUNTER — TELEMEDICINE (OUTPATIENT)
Dept: FAMILY MEDICINE CLINIC | Age: 89
End: 2024-04-02
Payer: MEDICARE

## 2024-04-02 DIAGNOSIS — Z00.00 MEDICARE ANNUAL WELLNESS VISIT, SUBSEQUENT: Primary | ICD-10-CM

## 2024-04-02 PROCEDURE — G0439 PPPS, SUBSEQ VISIT: HCPCS | Performed by: FAMILY MEDICINE

## 2024-04-02 PROCEDURE — 1123F ACP DISCUSS/DSCN MKR DOCD: CPT | Performed by: FAMILY MEDICINE

## 2024-04-02 SDOH — ECONOMIC STABILITY: FOOD INSECURITY: WITHIN THE PAST 12 MONTHS, YOU WORRIED THAT YOUR FOOD WOULD RUN OUT BEFORE YOU GOT MONEY TO BUY MORE.: NEVER TRUE

## 2024-04-02 SDOH — ECONOMIC STABILITY: INCOME INSECURITY: HOW HARD IS IT FOR YOU TO PAY FOR THE VERY BASICS LIKE FOOD, HOUSING, MEDICAL CARE, AND HEATING?: NOT HARD AT ALL

## 2024-04-02 ASSESSMENT — PATIENT HEALTH QUESTIONNAIRE - PHQ9
SUM OF ALL RESPONSES TO PHQ QUESTIONS 1-9: 0
2. FEELING DOWN, DEPRESSED OR HOPELESS: NOT AT ALL
1. LITTLE INTEREST OR PLEASURE IN DOING THINGS: NOT AT ALL
SUM OF ALL RESPONSES TO PHQ QUESTIONS 1-9: 0
SUM OF ALL RESPONSES TO PHQ QUESTIONS 1-9: 0
SUM OF ALL RESPONSES TO PHQ9 QUESTIONS 1 & 2: 0
SUM OF ALL RESPONSES TO PHQ QUESTIONS 1-9: 0

## 2024-04-02 ASSESSMENT — LIFESTYLE VARIABLES: HOW MANY STANDARD DRINKS CONTAINING ALCOHOL DO YOU HAVE ON A TYPICAL DAY: PATIENT DOES NOT DRINK

## 2024-04-02 NOTE — PROGRESS NOTES
Prior to Visit Medications    Medication Sig Taking? Authorizing Provider   clopidogrel (PLAVIX) 75 MG tablet Take 1 tablet by mouth daily  Emi Joyce MD   isosorbide mononitrate (IMDUR) 30 MG extended release tablet Take 1 tablet by mouth daily  Doug Man MD   rosuvastatin (CRESTOR) 10 MG tablet Take 1 tablet by mouth daily  Doug Man MD   amLODIPine (NORVASC) 5 MG tablet Take 1 tablet by mouth daily  Doug Man MD   aspirin 81 MG tablet Take 1 tablet by mouth daily  Emi Joyce MD   metoprolol (LOPRESSOR) 50 MG tablet Take 1 tablet by mouth 2 times daily  Provider, Historical, MD       CareTeam (Including outside providers/suppliers regularly involved in providing care):   Patient Care Team:  Stephanie Sena MD as PCP - General  Stephanie Sena MD as PCP - Empaneled Provider     Reviewed and updated this visit:  Tobacco  Allergies  Meds  Problems  Med Hx  Surg Hx  Soc Hx  Fam Hx        I, Annetta Lama LPN, 4/2/2024, performed the documented evaluation under the direct supervision of the attending physician.    Ileana Masterson, was evaluated through a synchronous (real-time) audio-video encounter. The patient (or guardian if applicable) is aware that this is a billable service, which includes applicable co-pays. This Virtual Visit was conducted with patient's (and/or legal guardian's) consent. Patient identification was verified, and a caregiver was present when appropriate.   The patient was located at Home: [facility] [address]  [place] [address]  Provider was located at Facility (Appt Dept): [address]   [address]  [place] [phone]  Confirm you are appropriately licensed, registered, or certified to deliver care in the state where the patient is located as indicated above. If you are not or unsure, please re-schedule the visit: Yes, I confirm.     This encounter was performed under myStephanie MD’s, direct supervision, 4/2/2024.

## 2024-10-30 ENCOUNTER — OFFICE VISIT (OUTPATIENT)
Dept: FAMILY MEDICINE CLINIC | Age: 89
End: 2024-10-30

## 2024-10-30 VITALS
WEIGHT: 146.6 LBS | BODY MASS INDEX: 24.43 KG/M2 | HEIGHT: 65 IN | DIASTOLIC BLOOD PRESSURE: 76 MMHG | HEART RATE: 74 BPM | OXYGEN SATURATION: 98 % | SYSTOLIC BLOOD PRESSURE: 164 MMHG

## 2024-10-30 DIAGNOSIS — Z23 NEED FOR INFLUENZA VACCINATION: ICD-10-CM

## 2024-10-30 DIAGNOSIS — I25.9 CHRONIC ISCHEMIC HEART DISEASE: Chronic | ICD-10-CM

## 2024-10-30 DIAGNOSIS — I10 PRIMARY HYPERTENSION: ICD-10-CM

## 2024-10-30 DIAGNOSIS — J30.89 NON-SEASONAL ALLERGIC RHINITIS, UNSPECIFIED TRIGGER: Primary | ICD-10-CM

## 2024-10-30 RX ORDER — FLUTICASONE PROPIONATE 50 MCG
2 SPRAY, SUSPENSION (ML) NASAL DAILY
Qty: 16 G | Refills: 2 | Status: SHIPPED | OUTPATIENT
Start: 2024-10-30

## 2024-10-30 SDOH — ECONOMIC STABILITY: FOOD INSECURITY: WITHIN THE PAST 12 MONTHS, THE FOOD YOU BOUGHT JUST DIDN'T LAST AND YOU DIDN'T HAVE MONEY TO GET MORE.: NEVER TRUE

## 2024-10-30 SDOH — ECONOMIC STABILITY: FOOD INSECURITY: WITHIN THE PAST 12 MONTHS, YOU WORRIED THAT YOUR FOOD WOULD RUN OUT BEFORE YOU GOT MONEY TO BUY MORE.: NEVER TRUE

## 2024-10-30 SDOH — ECONOMIC STABILITY: INCOME INSECURITY: HOW HARD IS IT FOR YOU TO PAY FOR THE VERY BASICS LIKE FOOD, HOUSING, MEDICAL CARE, AND HEATING?: NOT HARD AT ALL

## 2024-10-30 ASSESSMENT — ENCOUNTER SYMPTOMS
SORE THROAT: 0
SHORTNESS OF BREATH: 0
COUGH: 0
RHINORRHEA: 1

## 2024-10-30 NOTE — PROGRESS NOTES
Patient:  Ileana Diaz a 89 y.o. femalewho presents today with the following Chief Complaint(s):  Chief Complaint   Patient presents with    Oral Pain     Pt states that she has been having a lot of mucous in the back of her throat for the past few months. She states that she has to clear her throat a lot.       Patient says for the last few months she has been having a lot of clear drainage down the back of her throat and also from her nose.  She has to clear her throat because she has got drippage and drainage going down the back of her throat.  She denies feeling ill.  There is no fever there is no shortness of breath.  There is no pleuritic chest pain she has a history of allergic rhinitis.  She has tried Zyrtec without relief.  She has tried saline nasal spray without relief.    Patient has a history of CAD and hypertension and hyperlipidemia.  She sees cardiology for medication and management.  She was last seen in July.  That note was reviewed.          Current Outpatient Medications   Medication Sig Dispense Refill    fluticasone (FLONASE) 50 MCG/ACT nasal spray 2 sprays by Each Nostril route daily 16 g 2    clopidogrel (PLAVIX) 75 MG tablet Take 1 tablet by mouth daily      isosorbide mononitrate (IMDUR) 30 MG extended release tablet Take 1 tablet by mouth daily 30 tablet 3    rosuvastatin (CRESTOR) 10 MG tablet Take 1 tablet by mouth daily 30 tablet 3    amLODIPine (NORVASC) 5 MG tablet Take 1 tablet by mouth daily 30 tablet 3    aspirin 81 MG tablet Take 1 tablet by mouth daily      metoprolol (LOPRESSOR) 50 MG tablet Take 1 tablet by mouth 2 times daily       No current facility-administered medications for this visit.       Patients past medical history, surgical history, family history, medications and allergies were all reviewed and updated as appropriate today.      Review of Systems   Constitutional:  Negative for fever.   HENT:  Positive for postnasal drip and rhinorrhea. Negative for sore

## 2024-11-01 ENCOUNTER — TELEPHONE (OUTPATIENT)
Dept: FAMILY MEDICINE CLINIC | Age: 89
End: 2024-11-01

## 2024-11-01 NOTE — TELEPHONE ENCOUNTER
Pt was seen in the office on 10/30/24 and was prescribed Flonase nasal spray. She states that she was reading the package insert and it says not to use it if you have had cataracts. She is wanting to know if you will prescribe Ipratropium Bromide 0.03%. She states that this is what the pharmacist recommended.

## 2024-11-21 DIAGNOSIS — J30.89 NON-SEASONAL ALLERGIC RHINITIS, UNSPECIFIED TRIGGER: ICD-10-CM

## 2024-11-21 RX ORDER — FLUTICASONE PROPIONATE 50 MCG
2 SPRAY, SUSPENSION (ML) NASAL DAILY
Qty: 1 EACH | Refills: 5 | Status: SHIPPED | OUTPATIENT
Start: 2024-11-21

## 2024-11-21 NOTE — TELEPHONE ENCOUNTER
Ileana Masterson is requesting refill(s) flonase  Last OV 10/30/24(pertaining to medication)  LR 10/30/24 (per medication requested)  Next office visit scheduled or attempted No   If no, reason:  pt is due in April

## 2025-01-22 ENCOUNTER — TELEPHONE (OUTPATIENT)
Dept: FAMILY MEDICINE CLINIC | Age: 89
End: 2025-01-22

## 2025-01-26 ENCOUNTER — APPOINTMENT (OUTPATIENT)
Dept: GENERAL RADIOLOGY | Age: 89
End: 2025-01-26
Payer: MEDICARE

## 2025-01-26 ENCOUNTER — APPOINTMENT (OUTPATIENT)
Dept: CT IMAGING | Age: 89
End: 2025-01-26
Payer: MEDICARE

## 2025-01-26 ENCOUNTER — HOSPITAL ENCOUNTER (EMERGENCY)
Age: 89
Discharge: HOME | End: 2025-01-26
Attending: EMERGENCY MEDICINE
Payer: MEDICARE

## 2025-01-26 VITALS
HEIGHT: 65 IN | BODY MASS INDEX: 24.49 KG/M2 | TEMPERATURE: 98.6 F | WEIGHT: 147 LBS | SYSTOLIC BLOOD PRESSURE: 141 MMHG | DIASTOLIC BLOOD PRESSURE: 60 MMHG | OXYGEN SATURATION: 93 % | RESPIRATION RATE: 18 BRPM | HEART RATE: 61 BPM

## 2025-01-26 DIAGNOSIS — K57.90 DIVERTICULOSIS: Primary | ICD-10-CM

## 2025-01-26 LAB
ALBUMIN SERPL-MCNC: 3.7 G/DL (ref 3.4–5)
ALBUMIN/GLOB SERPL: 1.4 {RATIO} (ref 1.1–2.2)
ALP SERPL-CCNC: 57 U/L (ref 40–129)
ALT SERPL-CCNC: 16 U/L (ref 10–40)
ANION GAP SERPL CALCULATED.3IONS-SCNC: 9 MMOL/L (ref 3–16)
AST SERPL-CCNC: 27 U/L (ref 15–37)
BASOPHILS # BLD: 0 K/UL (ref 0–0.2)
BASOPHILS NFR BLD: 0.5 %
BILIRUB SERPL-MCNC: 0.5 MG/DL (ref 0–1)
BILIRUB UR QL STRIP.AUTO: NEGATIVE
BUN SERPL-MCNC: 13 MG/DL (ref 7–20)
CALCIUM SERPL-MCNC: 9.8 MG/DL (ref 8.3–10.6)
CHLORIDE SERPL-SCNC: 108 MMOL/L (ref 99–110)
CLARITY UR: CLEAR
CO2 SERPL-SCNC: 25 MMOL/L (ref 21–32)
COLOR UR: YELLOW
CREAT SERPL-MCNC: 0.9 MG/DL (ref 0.6–1.2)
DEPRECATED RDW RBC AUTO: 14.3 % (ref 12.4–15.4)
EKG ATRIAL RATE: 64 BPM
EKG DIAGNOSIS: NORMAL
EKG P AXIS: 33 DEGREES
EKG P-R INTERVAL: 236 MS
EKG Q-T INTERVAL: 388 MS
EKG QRS DURATION: 92 MS
EKG QTC CALCULATION (BAZETT): 400 MS
EKG R AXIS: -52 DEGREES
EKG T AXIS: 15 DEGREES
EKG VENTRICULAR RATE: 64 BPM
EOSINOPHIL # BLD: 0.2 K/UL (ref 0–0.6)
EOSINOPHIL NFR BLD: 2.7 %
GFR SERPLBLD CREATININE-BSD FMLA CKD-EPI: 60 ML/MIN/{1.73_M2}
GLUCOSE SERPL-MCNC: 104 MG/DL (ref 70–99)
GLUCOSE UR STRIP.AUTO-MCNC: NEGATIVE MG/DL
HCT VFR BLD AUTO: 42.4 % (ref 36–48)
HGB BLD-MCNC: 14.2 G/DL (ref 12–16)
HGB UR QL STRIP.AUTO: NEGATIVE
KETONES UR STRIP.AUTO-MCNC: NEGATIVE MG/DL
LEUKOCYTE ESTERASE UR QL STRIP.AUTO: NEGATIVE
LIPASE SERPL-CCNC: 35 U/L (ref 13–60)
LYMPHOCYTES # BLD: 0.6 K/UL (ref 1–5.1)
LYMPHOCYTES NFR BLD: 7.9 %
MCH RBC QN AUTO: 30 PG (ref 26–34)
MCHC RBC AUTO-ENTMCNC: 33.6 G/DL (ref 31–36)
MCV RBC AUTO: 89.2 FL (ref 80–100)
MONOCYTES # BLD: 0.5 K/UL (ref 0–1.3)
MONOCYTES NFR BLD: 5.9 %
NEUTROPHILS # BLD: 6.6 K/UL (ref 1.7–7.7)
NEUTROPHILS NFR BLD: 83 %
NITRITE UR QL STRIP.AUTO: NEGATIVE
PH UR STRIP.AUTO: 7 [PH] (ref 5–8)
PLATELET # BLD AUTO: 165 K/UL (ref 135–450)
PMV BLD AUTO: 10.3 FL (ref 5–10.5)
POTASSIUM SERPL-SCNC: 4.1 MMOL/L (ref 3.5–5.1)
PROT SERPL-MCNC: 6.3 G/DL (ref 6.4–8.2)
PROT UR STRIP.AUTO-MCNC: NEGATIVE MG/DL
RBC # BLD AUTO: 4.75 M/UL (ref 4–5.2)
SODIUM SERPL-SCNC: 142 MMOL/L (ref 136–145)
SP GR UR STRIP.AUTO: 1.01 (ref 1–1.03)
TROPONIN, HIGH SENSITIVITY: 19 NG/L (ref 0–14)
TROPONIN, HIGH SENSITIVITY: 19 NG/L (ref 0–14)
UA COMPLETE W REFLEX CULTURE PNL UR: NORMAL
UA DIPSTICK W REFLEX MICRO PNL UR: NORMAL
URN SPEC COLLECT METH UR: NORMAL
UROBILINOGEN UR STRIP-ACNC: 0.2 E.U./DL
WBC # BLD AUTO: 7.9 K/UL (ref 4–11)

## 2025-01-26 PROCEDURE — 93010 ELECTROCARDIOGRAM REPORT: CPT | Performed by: INTERNAL MEDICINE

## 2025-01-26 PROCEDURE — 6370000000 HC RX 637 (ALT 250 FOR IP)

## 2025-01-26 PROCEDURE — 99285 EMERGENCY DEPT VISIT HI MDM: CPT

## 2025-01-26 PROCEDURE — 83690 ASSAY OF LIPASE: CPT

## 2025-01-26 PROCEDURE — 80053 COMPREHEN METABOLIC PANEL: CPT

## 2025-01-26 PROCEDURE — 81003 URINALYSIS AUTO W/O SCOPE: CPT

## 2025-01-26 PROCEDURE — 85025 COMPLETE CBC W/AUTO DIFF WBC: CPT

## 2025-01-26 PROCEDURE — 36415 COLL VENOUS BLD VENIPUNCTURE: CPT

## 2025-01-26 PROCEDURE — 96374 THER/PROPH/DIAG INJ IV PUSH: CPT

## 2025-01-26 PROCEDURE — 93005 ELECTROCARDIOGRAM TRACING: CPT | Performed by: EMERGENCY MEDICINE

## 2025-01-26 PROCEDURE — 6360000002 HC RX W HCPCS

## 2025-01-26 PROCEDURE — 74177 CT ABD & PELVIS W/CONTRAST: CPT

## 2025-01-26 PROCEDURE — 6360000004 HC RX CONTRAST MEDICATION

## 2025-01-26 PROCEDURE — 71045 X-RAY EXAM CHEST 1 VIEW: CPT

## 2025-01-26 PROCEDURE — 96375 TX/PRO/DX INJ NEW DRUG ADDON: CPT

## 2025-01-26 PROCEDURE — 84484 ASSAY OF TROPONIN QUANT: CPT

## 2025-01-26 RX ORDER — ONDANSETRON 2 MG/ML
4 INJECTION INTRAMUSCULAR; INTRAVENOUS ONCE
Status: COMPLETED | OUTPATIENT
Start: 2025-01-26 | End: 2025-01-26

## 2025-01-26 RX ORDER — DICYCLOMINE HCL 20 MG
20 TABLET ORAL ONCE
Status: COMPLETED | OUTPATIENT
Start: 2025-01-26 | End: 2025-01-26

## 2025-01-26 RX ORDER — MORPHINE SULFATE 4 MG/ML
4 INJECTION, SOLUTION INTRAMUSCULAR; INTRAVENOUS ONCE
Status: COMPLETED | OUTPATIENT
Start: 2025-01-26 | End: 2025-01-26

## 2025-01-26 RX ORDER — IOPAMIDOL 755 MG/ML
75 INJECTION, SOLUTION INTRAVASCULAR
Status: COMPLETED | OUTPATIENT
Start: 2025-01-26 | End: 2025-01-26

## 2025-01-26 RX ADMIN — MORPHINE SULFATE 4 MG: 4 INJECTION, SOLUTION INTRAMUSCULAR; INTRAVENOUS at 11:32

## 2025-01-26 RX ADMIN — ONDANSETRON 4 MG: 2 INJECTION, SOLUTION INTRAMUSCULAR; INTRAVENOUS at 11:32

## 2025-01-26 RX ADMIN — DICYCLOMINE HYDROCHLORIDE 20 MG: 20 TABLET ORAL at 14:58

## 2025-01-26 RX ADMIN — IOPAMIDOL 75 ML: 755 INJECTION, SOLUTION INTRAVENOUS at 11:58

## 2025-01-26 ASSESSMENT — PAIN SCALES - GENERAL
PAINLEVEL_OUTOF10: 4

## 2025-01-26 ASSESSMENT — PAIN DESCRIPTION - DESCRIPTORS: DESCRIPTORS: SORE

## 2025-01-26 ASSESSMENT — PAIN DESCRIPTION - LOCATION: LOCATION: ABDOMEN

## 2025-01-26 ASSESSMENT — PAIN - FUNCTIONAL ASSESSMENT: PAIN_FUNCTIONAL_ASSESSMENT: 0-10

## 2025-01-26 NOTE — DISCHARGE INSTRUCTIONS
Your workup is overall reassuring.  Your EKG does not show changes.  Your heart enzymes are stable.  Your chest x-ray does not show any evidence of infection.  CT of the abdomen pelvis shows diverticulosis but no evidence of infection which would be diverticulitis.    Closely follow-up with your primary care doctor.  You can rotate between Motrin and Tylenol as needed for pain.  You can take 400 mg of Motrin and rotate with 500 mg of Tylenol every 6 hours.    Please return with fevers, chills, uncontrolled nausea or vomiting, blood in your stool, any other acutely worsening or concerning symptoms

## 2025-01-27 ASSESSMENT — ENCOUNTER SYMPTOMS
CHEST TIGHTNESS: 0
ABDOMINAL PAIN: 1
SHORTNESS OF BREATH: 0

## 2025-01-28 NOTE — ED PROVIDER NOTES
[facility] EMERGENCY DEPARTMENT  EMERGENCY DEPARTMENT ENCOUNTER        Pt Name: Ileana Masterson  MRN: [id number]  Birthdate 11/8/1932  Date of evaluation: 1/26/2025  Provider: MAIKOL Cisneros - OLLIE  PCP: Stephanie Sena MD  Note Started: 7:37 PM EST 1/27/25      SILVIO. I have evaluated this patient.        CHIEF COMPLAINT       Chief Complaint   Patient presents with    Abdominal Pain     The patient reports a left side abd pain that she says she's had most of her life. For a couple of months it's been radiating across her abd to the right side and describes it as burning. When the pain was high today she said it made her heart race. She reports a possible of history of gallbladder disease.        HISTORY OF PRESENT ILLNESS: 1 or more Elements     History From: Patient, EMR review    Chief Complaint: Recurrent abdominal pain    Ileana Masterson is a 89 y.o. female with a past medical history of chronic ischemic heart disease, GERD, coronary artery disease, uncontrolled hypertension, hyperlipidemia, abdominal pain who presents to the emergency department for evaluation of acute on chronic abdominal pain.  States that this has been a longstanding ongoing issue for her over the past several decades.  Over the past month she has had recurrent of symptoms that she describes as upper transverse abdominal pain.  Described as a burning sensation.  She did have an episode today where she had a brief period of sharp stabbing pain which caused her to feel like her heart started racing.  This prompted her to come to the emergency department for evaluation.  No change in dietary habits.  No constipation, diarrhea, nausea, vomiting.  No fevers or known direct sick contacts.  No associated dysuria, gross hematuria, frequency, hesitancy    Nursing Notes were all reviewed and agreed with or any disagreements were addressed in the HPI.    REVIEW OF SYSTEMS :      Review of Systems   Constitutional:  Negative for chills,

## 2025-04-08 ENCOUNTER — TELEMEDICINE (OUTPATIENT)
Dept: FAMILY MEDICINE CLINIC | Age: 89
End: 2025-04-08

## 2025-04-08 DIAGNOSIS — Z00.00 MEDICARE ANNUAL WELLNESS VISIT, SUBSEQUENT: Primary | ICD-10-CM

## 2025-04-08 ASSESSMENT — LIFESTYLE VARIABLES
HOW OFTEN DO YOU HAVE A DRINK CONTAINING ALCOHOL: NEVER
HOW MANY STANDARD DRINKS CONTAINING ALCOHOL DO YOU HAVE ON A TYPICAL DAY: PATIENT DOES NOT DRINK

## 2025-04-08 ASSESSMENT — PATIENT HEALTH QUESTIONNAIRE - PHQ9
SUM OF ALL RESPONSES TO PHQ QUESTIONS 1-9: 0
2. FEELING DOWN, DEPRESSED OR HOPELESS: NOT AT ALL
1. LITTLE INTEREST OR PLEASURE IN DOING THINGS: NOT AT ALL
SUM OF ALL RESPONSES TO PHQ QUESTIONS 1-9: 0

## 2025-04-08 NOTE — PATIENT INSTRUCTIONS
attack. These may include:    Chest pain or pressure, or a strange feeling in the chest.     Sweating.     Shortness of breath.     Pain, pressure, or a strange feeling in the back, neck, jaw, or upper belly or in one or both shoulders or arms.     Lightheadedness or sudden weakness.     A fast or irregular heartbeat.   After you call 911, the  may tell you to chew 1 adult-strength or 2 to 4 low-dose aspirin. Wait for an ambulance. Do not try to drive yourself.  Watch closely for changes in your health, and be sure to contact your doctor if you have any problems.  Where can you learn more?  Go to https://www.[employer].net/patientEd and enter F075 to learn more about \"A Healthy Heart: Care Instructions.\"  Current as of: July 31, 2024  Content Version: 14.4  © 7955-8344 [employer].   Care instructions adapted under license by [employer]. If you have questions about a medical condition or this instruction, always ask your healthcare professional. [employer], disclaims any warranty or liability for your use of this information.    Personalized Preventive Plan for Ileana Masterson - 4/8/2025  Medicare offers a range of preventive health benefits. Some of the tests and screenings are paid in full while other may be subject to a deductible, co-insurance, and/or copay.  Some of these benefits include a comprehensive review of your medical history including lifestyle, illnesses that may run in your family, and various assessments and screenings as appropriate.  After reviewing your medical record and screening and assessments performed today your provider may have ordered immunizations, labs, imaging, and/or referrals for you.  A list of these orders (if applicable) as well as your Preventive Care list are included within your After Visit Summary for your review.

## 2025-04-08 NOTE — PROGRESS NOTES
spray SPRAY 2 SPRAYS INTO EACH NOSTRIL EVERY DAY  Patient not taking: Reported on 4/8/2025  Stephanie Sena MD   aspirin 81 MG tablet Take 1 tablet by mouth daily  Patient not taking: Reported on 4/8/2025  Provider, MD Genoveva Middleton (Including outside providers/suppliers regularly involved in providing care):   Patient Care Team:  Stephanie Sena MD as PCP - General  Stephanie Sena MD as PCP - Empaneled Provider     Recommendations for Preventive Services Due: see orders and patient instructions/AVS.  Recommended screening schedule for the next 5-10 years is provided to the patient in written form: see Patient Instructions/AVS.     Reviewed and updated this visit:  Tobacco  Allergies  Meds  Problems  Med Hx  Surg Hx  Fam Hx  Sexual   Hx            I, Annetta Lama LPN, 4/8/2025, performed the documented evaluation under the direct supervision of the attending physician.  Ileana Masterson, was evaluated through a synchronous (real-time) audio-video encounter. The patient (or guardian if applicable) is aware that this is a billable service, which includes applicable co-pays. This Virtual Visit was conducted with patient's (and/or legal guardian's) consent. Patient identification was verified, and a caregiver was present when appropriate.   The patient was located at Home: [facility] [address]  [place] [address]  Provider was located at Facility (Appt Dept): [address]   [address]  [place] [address]  Confirm you are appropriately licensed, registered, or certified to deliver care in the state where the patient is located as indicated above. If you are not or unsure, please re-schedule the visit: Yes, I confirm.      This encounter was performed under Stephanie granado MD’s, direct supervision, 4/8/2025.